# Patient Record
Sex: FEMALE | Race: WHITE | NOT HISPANIC OR LATINO | Employment: FULL TIME | URBAN - NONMETROPOLITAN AREA
[De-identification: names, ages, dates, MRNs, and addresses within clinical notes are randomized per-mention and may not be internally consistent; named-entity substitution may affect disease eponyms.]

---

## 2017-01-04 ENCOUNTER — ROUTINE PRENATAL (OUTPATIENT)
Dept: OBSTETRICS AND GYNECOLOGY | Facility: CLINIC | Age: 22
End: 2017-01-04

## 2017-01-04 DIAGNOSIS — O34.219 PREVIOUS CESAREAN DELIVERY AFFECTING PREGNANCY: ICD-10-CM

## 2017-01-04 DIAGNOSIS — Z3A.32 32 WEEKS GESTATION OF PREGNANCY: ICD-10-CM

## 2017-01-04 DIAGNOSIS — O47.9 BRAXTON HICK'S CONTRACTION: Primary | ICD-10-CM

## 2017-01-04 DIAGNOSIS — O34.219 HX SUCCESSFUL VBAC (VAGINAL BIRTH AFTER CESAREAN), CURRENTLY PREGNANT: ICD-10-CM

## 2017-01-04 PROCEDURE — 99212 OFFICE O/P EST SF 10 MIN: CPT | Performed by: NURSE PRACTITIONER

## 2017-01-04 NOTE — MR AVS SNAPSHOT
Dorina Stockton   2017 9:00 AM   Routine Prenatal    Dept Phone:  [phone]   Encounter #:  [id number]    Provider:  YUNIOR Britton   Department:  [facility]                Your Full Care Plan              Your Updated Medication List          This list is accurate as of: 17  9:25 AM.  Always use your most recent med list.                ondansetron 4 MG tablet   Commonly known as:  ZOFRAN       raNITIdine 150 MG tablet   Commonly known as:  ZANTAC   Take 1 tablet by mouth 2 (two) times a day.               Instructions     None    Patient Instructions History      Upcoming Appointments     Visit Type Date Time Department    OB FOLLOWUP 2017  9:00 AM [facility]    OB FOLLOWUP 2017  1:15 PM [facility]      MyChart Signup     [employer] allows you to send messages to your doctor, view your test results, renew your prescriptions, schedule appointments, and more. To sign up, go to [employer] and click on the Sign Up Now link in the New User? box. Enter your [employer] Activation Code exactly as it appears below along with the last four digits of your Social Security Number and your Date of Birth () to complete the sign-up process. If you do not sign up before the expiration date, you must request a new code.    [employer] Activation Code: [email or web address]  Expires: 2017  9:24 AM    If you have questions, you can email [email or web address]) or call [phone] to talk to our [employer] staff. Remember, [employer] is NOT to be used for urgent needs. For medical emergencies, dial 911.               Other Info from Your Visit           Your Appointments     2017  1:15 PM CST   OB FOLLOWUP with Dominik Toribio MD   [facility] (--)    [address] [place] [address] Dr  [facility] [address] [place] [address] [id number] [phone]              Allergies     Sulfa Antibiotics        Vital Signs      Last Menstrual Period Smoking Status                05/20/2016 (Exact Date) Current Every Day Smoker

## 2017-01-18 ENCOUNTER — HOSPITAL ENCOUNTER (INPATIENT)
Dept: OTHER | Facility: HOSPITAL | Age: 22
LOS: 2 days | Discharge: HOME | End: 2017-01-20
Attending: OBSTETRICS & GYNECOLOGY | Admitting: OBSTETRICS & GYNECOLOGY

## 2017-01-18 LAB
ABO GROUP BLD: NORMAL
ALBUMIN SERPL-MCNC: 2.7 GM/DL (ref 3.4–4.8)
ALP SERPL-CCNC: 139 U/L (ref 38–126)
ALT SERPL-CCNC: 20 U/L (ref 9–52)
AMPHETAMINES UR QL: ABNORMAL
ANION GAP SERPL CALCULATED.3IONS-SCNC: 7 MMOL/L (ref 5–15)
AST SERPL-CCNC: 8 U/L (ref 14–36)
BARBITURATES UR QL: NEGATIVE
BASOPHILS # BLD AUTO: 0.01 X1000/UL (ref 0–0.2)
BASOPHILS NFR BLD AUTO: 0.1 % (ref 0–2)
BENZODIAZ UR QL: NEGATIVE
BILIRUB SERPL-MCNC: 0.3 MG/DL (ref 0.2–1.3)
BLD GP AB INVEST PLASRBC-IMP: NORMAL
BUN SERPL-MCNC: 11 MG/DL (ref 7–21)
CALCIUM SERPL-MCNC: 8.3 MG/DL (ref 8.4–10.2)
CANNABINOIDS UR QL SCN: NEGATIVE
CHLORIDE SERPL-SCNC: 105 MMOL/L (ref 95–110)
CO2 SERPL-SCNC: 23 MMOL/L (ref 22–31)
COCAINE UR QL: NEGATIVE
CONV AUTO IG#: 0.04 X1000/UL (ref 0.01–0.02)
CONV AUTO IG%: 0.4 % (ref 0–0.5)
CREAT SERPL-MCNC: 0.5 MG/DL (ref 0.5–1)
EOSINOPHIL # BLD AUTO: 0.08 X1000/UL (ref 0–0.7)
EOSINOPHIL NFR BLD AUTO: 0.8 % (ref 0–7)
ERYTHROCYTE [DISTWIDTH] IN BLOOD: 14 % (ref 11.5–14.5)
GLUCOSE SERPL-MCNC: 86 MG/DL (ref 60–100)
GRANULOCYTES # BLD AUTO: 7.66 X1000/UL (ref 2–8.6)
GRANULOCYTES NFR BLD AUTO: 72.7 % (ref 37–80)
HCT VFR BLD CALC: 34.7 % (ref 35–45)
HGB BLD-MCNC: 11.5 GM/DL (ref 12–15.5)
LYMPHOCYTES # BLD AUTO: 2.01 X1000/UL (ref 0.6–4.2)
LYMPHOCYTES NFR BLD AUTO: 19.1 % (ref 10–50)
MCH RBC QN: 26.6 PG (ref 26–34)
MCHC RBC-ENTMCNC: 33.1 GM/DL (ref 31.4–36)
MCV RBC: 80.1 FL (ref 80–98)
METHADONE UR QL: NEGATIVE
MONOCYTES # BLD AUTO: 0.73 X1000/UL (ref 0–0.9)
MONOCYTES NFR BLD AUTO: 6.9 % (ref 0–12)
NRBC BLD AUTO-RTO: 0 % (ref 0–0.2)
NRBC SPEC MANUAL: 0 X1000/UL
OPIATES UR QL: NEGATIVE
OXYCODONE UR QL: NEGATIVE
PLATELET # BLD: 187 X1000/MM3 (ref 150–450)
PMV BLD: 9.9 FL (ref 8–12)
POTASSIUM SERPL-SCNC: 3.7 MMOL/L (ref 3.5–5.1)
PREVIOUS ABORH EXIST?: NORMAL
PROT SERPL-MCNC: 5.3 GM/DL (ref 6.3–8.6)
RBC # BLD: 4.33 MEGA/MM3 (ref 3.77–5.16)
RH BLD: NORMAL
SODIUM SERPL-SCNC: 135 MMOL/L (ref 137–145)
WBC # BLD: 10.5 X1000/UL (ref 3.2–9.8)

## 2017-01-19 LAB
HCT VFR BLD CALC: 28.1 % (ref 35–45)
HGB BLD-MCNC: 9.2 GM/DL (ref 12–15.5)

## 2017-01-20 VITALS — BODY MASS INDEX: 31.08 KG/M2 | HEIGHT: 67 IN | WEIGHT: 198 LBS

## 2017-01-20 LAB
CONVERTED (HISTORICAL) SPECIMEN DESCRIPTION 1: NORMAL
RPR SER QL: NON REACTIVE

## 2017-01-20 RX ORDER — IBUPROFEN 800 MG/1
800 TABLET ORAL EVERY 8 HOURS PRN
COMMUNITY
End: 2017-06-08

## 2017-01-20 RX ORDER — PRENATAL VIT NO.126/IRON/FOLIC 28MG-0.8MG
1 TABLET ORAL DAILY
COMMUNITY
End: 2017-04-10

## 2017-01-23 LAB
AMPHET UR CFM-MCNC: 4580 NG/ML
AMPHET UR QL CFM: POSITIVE
AMPHETAMINES UR QL: POSITIVE
Lab: ABNORMAL
METHAMPHET UR CFM-MCNC: >4000 NG/ML
METHAMPHET UR QL CFM: POSITIVE

## 2017-04-10 ENCOUNTER — OFFICE VISIT (OUTPATIENT)
Dept: OBSTETRICS AND GYNECOLOGY | Facility: CLINIC | Age: 22
End: 2017-04-10

## 2017-04-10 DIAGNOSIS — Z30.430 ENCOUNTER FOR IUD INSERTION: ICD-10-CM

## 2017-04-10 PROCEDURE — 99024 POSTOP FOLLOW-UP VISIT: CPT | Performed by: OBSTETRICS & GYNECOLOGY

## 2017-04-10 PROCEDURE — 58300 INSERT INTRAUTERINE DEVICE: CPT | Performed by: OBSTETRICS & GYNECOLOGY

## 2017-04-10 NOTE — PROGRESS NOTES
Subjective   Chief Complaint   Patient presents with   • Postpartum Care     Dorina Stockton is a 22 y.o. year old  presenting for a postpartum visit.  She had a vaginal delivery.   Prenatal course was been benign.    Since delivery she has been sexually active.  She does not have concerns about post-partum blues/depression.   She is bottle feeding    The following portions of the patient's history were reviewed and updated as appropriate:current medications, allergies and past surgical history    Smoking status: Current Every Day Smoker                                                   Packs/day: 0.25      Years: 0.00         Types: Cigarettes     Smokeless status: Not on file                       Review of Systems      Objective   LMP 2016 (Exact Date)  Breastfeeding? No    General:  well developed; well nourished  no acute distress   Abdomen: soft, non-tender; no masses  no umbilical or inginual hernias are present  no hepato-splenomegaly   Pelvis: Clinical staff was present for exam  External genitalia:  normal appearance of the external genitalia including Bartholin's and [place]'s glands.  :  urethral meatus normal; urethral hypermobility is absent.  Vaginal:  normal pink mucosa without prolapse or lesions.  Cervix:  normal appearance.  Uterus:  normal size, shape and consistency.       Risks and benefits discussed? yes  All questions answered? yes  Consents given by The patient  Written consent obtained? yes    Local anesthesia used:  no  NDC number:    Procedure documentation:    After verifying the patient had a low probability of being pregnant and met the criteria for insertion, a speculum was placed and the cervix was cleansed with an antiseptic solution.  The anterior lip of the cervix was grasped and the uterine cavity was gently sounded. There was mild difficulty passing the sound through the cervix.  Cervical dilation did not need to be performed prior to placing the IUD.  The  uterus was anteverted and sounded to 7 cms.  The Shirley was then prepared per the manufacturers instructions.    The Shirley was advanced to a point 2 cms from the fundus and then the arms were released from the sheath.  The device was advanced to the fundus and the device was released fully from the sheath.. The string was cut 3 cms in length.  Bleeding from the cervix was scant.    She tolerated the procedure without any difficulty.    NDC 60309-161-38     Post procedure instructions: Call if any fever or excessive bleeding or pain.                                                       Nothing in the vagina for the next week.    Follow up needed:  Three weeks for a string check, sooner if she has any problems.    This note was electronically signed.     Assessment   1. Normal 12 week postpartum exam  2. IUD insertion     Plan   1. Follow up in 3 weeks for a string check         This note was electronically signed.    Dominik Toribio MD  April 10, 2017

## 2017-05-01 ENCOUNTER — OFFICE VISIT (OUTPATIENT)
Dept: OBSTETRICS AND GYNECOLOGY | Facility: CLINIC | Age: 22
End: 2017-05-01

## 2017-05-01 VITALS
HEIGHT: 67 IN | DIASTOLIC BLOOD PRESSURE: 80 MMHG | SYSTOLIC BLOOD PRESSURE: 128 MMHG | BODY MASS INDEX: 27.31 KG/M2 | WEIGHT: 174 LBS

## 2017-05-01 DIAGNOSIS — Z30.431 IUD CHECK UP: Primary | ICD-10-CM

## 2017-05-01 PROCEDURE — 99212 OFFICE O/P EST SF 10 MIN: CPT | Performed by: OBSTETRICS & GYNECOLOGY

## 2017-06-07 ENCOUNTER — APPOINTMENT (OUTPATIENT)
Dept: GENERAL RADIOLOGY | Facility: HOSPITAL | Age: 22
End: 2017-06-07

## 2017-06-07 ENCOUNTER — HOSPITAL ENCOUNTER (EMERGENCY)
Facility: HOSPITAL | Age: 22
Discharge: HOME | End: 2017-06-07
Attending: EMERGENCY MEDICINE | Admitting: EMERGENCY MEDICINE

## 2017-06-07 VITALS
OXYGEN SATURATION: 97 % | HEIGHT: 67 IN | DIASTOLIC BLOOD PRESSURE: 68 MMHG | HEART RATE: 68 BPM | WEIGHT: 175 LBS | RESPIRATION RATE: 16 BRPM | BODY MASS INDEX: 27.47 KG/M2 | TEMPERATURE: 98.1 F | SYSTOLIC BLOOD PRESSURE: 133 MMHG

## 2017-06-07 DIAGNOSIS — R10.30 LOWER ABDOMINAL PAIN: Primary | ICD-10-CM

## 2017-06-07 LAB
ALBUMIN SERPL-MCNC: 4.3 G/DL (ref 3.4–4.8)
ALBUMIN/GLOB SERPL: 1.3 G/DL (ref 1.1–1.8)
ALP SERPL-CCNC: 91 U/L (ref 38–126)
ALT SERPL W P-5'-P-CCNC: 34 U/L (ref 9–52)
ANION GAP SERPL CALCULATED.3IONS-SCNC: 12 MMOL/L (ref 5–15)
AST SERPL-CCNC: 16 U/L (ref 14–36)
BASOPHILS # BLD AUTO: 0.04 10*3/MM3 (ref 0–0.2)
BASOPHILS NFR BLD AUTO: 0.5 % (ref 0–2)
BILIRUB SERPL-MCNC: 0.3 MG/DL (ref 0.2–1.3)
BILIRUB UR QL STRIP: NEGATIVE
BUN BLD-MCNC: 8 MG/DL (ref 7–21)
BUN/CREAT SERPL: 11.3 (ref 7–25)
CALCIUM SPEC-SCNC: 9.3 MG/DL (ref 8.4–10.2)
CHLORIDE SERPL-SCNC: 103 MMOL/L (ref 95–110)
CLARITY UR: CLEAR
CO2 SERPL-SCNC: 24 MMOL/L (ref 22–31)
COLOR UR: YELLOW
CREAT BLD-MCNC: 0.71 MG/DL (ref 0.5–1)
DEPRECATED RDW RBC AUTO: 45.8 FL (ref 36.4–46.3)
EOSINOPHIL # BLD AUTO: 0.36 10*3/MM3 (ref 0–0.7)
EOSINOPHIL NFR BLD AUTO: 4.1 % (ref 0–7)
ERYTHROCYTE [DISTWIDTH] IN BLOOD BY AUTOMATED COUNT: 16.3 % (ref 11.5–14.5)
GFR SERPL CREATININE-BSD FRML MDRD: 102 ML/MIN/1.73 (ref 60–165)
GLOBULIN UR ELPH-MCNC: 3.4 GM/DL (ref 2.3–3.5)
GLUCOSE BLD-MCNC: 81 MG/DL (ref 60–100)
GLUCOSE UR STRIP-MCNC: NEGATIVE MG/DL
HCT VFR BLD AUTO: 40 % (ref 35–45)
HGB BLD-MCNC: 13.1 G/DL (ref 12–15.5)
HGB UR QL STRIP.AUTO: NEGATIVE
HOLD SPECIMEN: NORMAL
HOLD SPECIMEN: NORMAL
IMM GRANULOCYTES # BLD: 0.01 10*3/MM3 (ref 0–0.02)
IMM GRANULOCYTES NFR BLD: 0.1 % (ref 0–0.5)
KETONES UR QL STRIP: NEGATIVE
LEUKOCYTE ESTERASE UR QL STRIP.AUTO: NEGATIVE
LIPASE SERPL-CCNC: 76 U/L (ref 23–300)
LYMPHOCYTES # BLD AUTO: 2.6 10*3/MM3 (ref 0.6–4.2)
LYMPHOCYTES NFR BLD AUTO: 29.4 % (ref 10–50)
MCH RBC QN AUTO: 25 PG (ref 26.5–34)
MCHC RBC AUTO-ENTMCNC: 32.8 G/DL (ref 31.4–36)
MCV RBC AUTO: 76.2 FL (ref 80–98)
MONOCYTES # BLD AUTO: 0.57 10*3/MM3 (ref 0–0.9)
MONOCYTES NFR BLD AUTO: 6.4 % (ref 0–12)
NEUTROPHILS # BLD AUTO: 5.26 10*3/MM3 (ref 2–8.6)
NEUTROPHILS NFR BLD AUTO: 59.5 % (ref 37–80)
NITRITE UR QL STRIP: NEGATIVE
PH UR STRIP.AUTO: 7.5 [PH] (ref 5–9)
PLATELET # BLD AUTO: 225 10*3/MM3 (ref 150–450)
PMV BLD AUTO: 10.5 FL (ref 8–12)
POTASSIUM BLD-SCNC: 3.6 MMOL/L (ref 3.5–5.1)
PROT SERPL-MCNC: 7.7 G/DL (ref 6.3–8.6)
PROT UR QL STRIP: NEGATIVE
RBC # BLD AUTO: 5.25 10*6/MM3 (ref 3.77–5.16)
SODIUM BLD-SCNC: 139 MMOL/L (ref 137–145)
SP GR UR STRIP: 1.01 (ref 1–1.03)
UROBILINOGEN UR QL STRIP: NORMAL
WBC NRBC COR # BLD: 8.84 10*3/MM3 (ref 3.2–9.8)
WHOLE BLOOD HOLD SPECIMEN: NORMAL
WHOLE BLOOD HOLD SPECIMEN: NORMAL

## 2017-06-07 PROCEDURE — 81003 URINALYSIS AUTO W/O SCOPE: CPT | Performed by: EMERGENCY MEDICINE

## 2017-06-07 PROCEDURE — 83690 ASSAY OF LIPASE: CPT | Performed by: EMERGENCY MEDICINE

## 2017-06-07 PROCEDURE — 25010000002 ONDANSETRON PER 1 MG: Performed by: EMERGENCY MEDICINE

## 2017-06-07 PROCEDURE — 85025 COMPLETE CBC W/AUTO DIFF WBC: CPT | Performed by: EMERGENCY MEDICINE

## 2017-06-07 PROCEDURE — 80053 COMPREHEN METABOLIC PANEL: CPT | Performed by: EMERGENCY MEDICINE

## 2017-06-07 PROCEDURE — 74020 HC XR ABDOMEN FLAT & UPRIGHT: CPT

## 2017-06-07 PROCEDURE — 96375 TX/PRO/DX INJ NEW DRUG ADDON: CPT

## 2017-06-07 PROCEDURE — 99284 EMERGENCY DEPT VISIT MOD MDM: CPT

## 2017-06-07 PROCEDURE — 96374 THER/PROPH/DIAG INJ IV PUSH: CPT

## 2017-06-07 PROCEDURE — 96361 HYDRATE IV INFUSION ADD-ON: CPT

## 2017-06-07 PROCEDURE — 25010000002 HYDROMORPHONE PER 4 MG: Performed by: EMERGENCY MEDICINE

## 2017-06-07 PROCEDURE — 25010000002 KETOROLAC TROMETHAMINE PER 15 MG: Performed by: EMERGENCY MEDICINE

## 2017-06-07 RX ORDER — ONDANSETRON 2 MG/ML
4 INJECTION INTRAMUSCULAR; INTRAVENOUS ONCE
Status: COMPLETED | OUTPATIENT
Start: 2017-06-07 | End: 2017-06-07

## 2017-06-07 RX ORDER — KETOROLAC TROMETHAMINE 30 MG/ML
30 INJECTION, SOLUTION INTRAMUSCULAR; INTRAVENOUS ONCE
Status: COMPLETED | OUTPATIENT
Start: 2017-06-07 | End: 2017-06-07

## 2017-06-07 RX ORDER — SODIUM CHLORIDE 0.9 % (FLUSH) 0.9 %
10 SYRINGE (ML) INJECTION AS NEEDED
Status: DISCONTINUED | OUTPATIENT
Start: 2017-06-07 | End: 2017-06-07 | Stop reason: HOSPADM

## 2017-06-07 RX ORDER — HYDROCODONE BITARTRATE AND ACETAMINOPHEN 5; 325 MG/1; MG/1
1 TABLET ORAL EVERY 6 HOURS PRN
Qty: 10 TABLET | Refills: 0 | Status: SHIPPED | OUTPATIENT
Start: 2017-06-07 | End: 2017-10-23

## 2017-06-07 RX ADMIN — SODIUM CHLORIDE 1000 ML: 9 INJECTION, SOLUTION INTRAVENOUS at 01:23

## 2017-06-07 RX ADMIN — KETOROLAC TROMETHAMINE 30 MG: 30 INJECTION, SOLUTION INTRAMUSCULAR at 01:22

## 2017-06-07 RX ADMIN — HYDROMORPHONE HYDROCHLORIDE 1 MG: 1 INJECTION, SOLUTION INTRAMUSCULAR; INTRAVENOUS; SUBCUTANEOUS at 01:23

## 2017-06-07 RX ADMIN — ONDANSETRON 4 MG: 2 INJECTION INTRAMUSCULAR; INTRAVENOUS at 01:22

## 2017-06-07 NOTE — DISCHARGE INSTRUCTIONS
Followup with Dr. Toribio as scheduled to discuss options for IUD removal.  Return with any new or worsening symptoms, or any concerns.

## 2017-06-07 NOTE — ED PROVIDER NOTES
Subjective   HPI Comments: Patient presents with abdominal pain for well over one month.  Patient states she has been seen by her GYN in the last month for the same.  Patient has had progressive lower abdominal cramping.  No vaginal discharge.  Is set to see her physician, Dr. Toribio again on Thursday, so she can hopefully have her IUD removed.  No f/c.  No n/v.  No diarrhea, no dysuria.  No new sexual partners.        History provided by:  Patient   used: No        Review of Systems   Constitutional: Negative.  Negative for appetite change, chills and fever.   HENT: Negative.  Negative for congestion.    Eyes: Negative.  Negative for photophobia and visual disturbance.   Respiratory: Negative.  Negative for cough, chest tightness and shortness of breath.    Cardiovascular: Negative.  Negative for chest pain and palpitations.   Gastrointestinal: Positive for abdominal pain. Negative for constipation, diarrhea, nausea and vomiting.   Endocrine: Negative.    Genitourinary: Negative.  Negative for decreased urine volume, dysuria, flank pain and hematuria.   Musculoskeletal: Negative.  Negative for arthralgias, back pain, myalgias, neck pain and neck stiffness.   Skin: Negative.  Negative for pallor.   Neurological: Negative.  Negative for dizziness, syncope, weakness, light-headedness, numbness and headaches.   Psychiatric/Behavioral: Negative.  Negative for confusion and suicidal ideas. The patient is not nervous/anxious.        Past Medical History:   Diagnosis Date   • Abdominal pain    • Acute bronchitis    • Dietary counseling and surveillance    • Encounter for  visit    • General counseling and advice on female contraception     would like a gary   • Gestational diabetes mellitus    • Nausea    • Pregnancy test positive     serum   • Tension type headache    • Viral gastroenteritis        Allergies   Allergen Reactions   • Sulfa Antibiotics        History reviewed. No pertinent  surgical history.    Family History   Problem Relation Age of Onset   • Other Father      carbon monoxide poisoning   • Cancer Other    • Diabetes Other    • Hypertension Other    • Stroke Other        Social History     Social History   • Marital status: Single     Spouse name: N/A   • Number of children: N/A   • Years of education: N/A     Social History Main Topics   • Smoking status: Current Every Day Smoker     Packs/day: 0.25     Types: Cigarettes   • Smokeless tobacco: None   • Alcohol use None   • Drug use: None   • Sexual activity: Not Asked     Other Topics Concern   • None     Social History Narrative           Objective   Physical Exam   Constitutional: She is oriented to person, place, and time. She appears well-developed and well-nourished. No distress.   HENT:   Head: Normocephalic and atraumatic.   Nose: Nose normal.   Mouth/Throat: Oropharynx is clear and moist.   Eyes: Conjunctivae and EOM are normal. No scleral icterus.   Neck: Normal range of motion. Neck supple. No JVD present.   Cardiovascular: Normal rate, regular rhythm, normal heart sounds and intact distal pulses.  Exam reveals no gallop and no friction rub.    No murmur heard.  Pulmonary/Chest: Effort normal. No respiratory distress. She has no wheezes. She has no rales. She exhibits no tenderness.   Abdominal: Soft. She exhibits no distension and no mass. There is tenderness (Mild to moderate RLQ suprapubic tenderness without peritoneal signs.  ). There is no rebound and no guarding.   Musculoskeletal: Normal range of motion. She exhibits no edema, tenderness or deformity.   Lymphadenopathy:     She has no cervical adenopathy.   Neurological: She is alert and oriented to person, place, and time. No cranial nerve deficit. She exhibits normal muscle tone.   Skin: Skin is warm and dry. No rash noted. She is not diaphoretic. No erythema. No pallor.   Psychiatric: She has a normal mood and affect. Her behavior is normal. Judgment and thought  content normal.   Nursing note and vitals reviewed.      Procedures         ED Course  ED Course      Labs Reviewed   CBC WITH AUTO DIFFERENTIAL - Abnormal; Notable for the following:        Result Value    RBC 5.25 (*)     MCV 76.2 (*)     MCH 25.0 (*)     RDW 16.3 (*)     All other components within normal limits   COMPREHENSIVE METABOLIC PANEL - Normal   LIPASE - Normal   URINALYSIS W/ CULTURE IF INDICATED - Normal    Narrative:     Urine microscopic not indicated.   RAINBOW DRAW    Narrative:     The following orders were created for panel order [place] Draw.  Procedure                               Abnormality         Status                     ---------                               -----------         ------                     Light Blue Top[[id number]]                                    In process                 Green Top (Gel)[[id number]]                                   In process                 Lavender Top[[id number]]                                      In process                 Gold Top - SST[[id number]]                                    In process                   Please view results for these tests on the individual orders.   CBC AND DIFFERENTIAL    Narrative:     The following orders were created for panel order CBC & Differential.  Procedure                               Abnormality         Status                     ---------                               -----------         ------                     CBC Auto Differential[[id number]]         Abnormal            Final result                 Please view results for these tests on the individual orders.   LIGHT BLUE TOP   GREEN TOP   LAVENDER TOP   GOLD TOP - SST       XR Abdomen Flat & Upright   Final Result   Nonspecific abdomen.      Electronically signed by:  Teo Soto  6/7/2017 1:24 AM CDT   Workstation: [id number]        No peritoneal signs.  Subacute symptoms, though worsening.  Patient has OB followup in less than 48 hours which  appears appropriate for the gradual worsening of symptoms.  Patient associates discomfort with IUD and at this point seems convinced nothing but its removal will improve the symptoms and her associated headaches.  Continues without peritoneal signs.  Improved with ED interventions.  Plan short term pain control.  [facility]#[id number]              [facility]    Final diagnoses:   Lower abdominal pain            Bernardo Meier MD  06/07/17 0152       Bernardo Meier MD  06/07/17 0157

## 2017-06-08 ENCOUNTER — OFFICE VISIT (OUTPATIENT)
Dept: OBSTETRICS AND GYNECOLOGY | Facility: CLINIC | Age: 22
End: 2017-06-08

## 2017-06-08 VITALS
BODY MASS INDEX: 27.94 KG/M2 | DIASTOLIC BLOOD PRESSURE: 88 MMHG | WEIGHT: 178 LBS | HEIGHT: 67 IN | SYSTOLIC BLOOD PRESSURE: 138 MMHG

## 2017-06-08 DIAGNOSIS — T83.9XXA COMPLICATION OF INTRAUTERINE DEVICE (IUD), UNSPECIFIED COMPLICATION, INITIAL ENCOUNTER (HCC): Primary | ICD-10-CM

## 2017-06-08 PROCEDURE — 58301 REMOVE INTRAUTERINE DEVICE: CPT | Performed by: OBSTETRICS & GYNECOLOGY

## 2017-06-08 NOTE — PROGRESS NOTES
IUD Removal    Date of procedure:  6/8/2017    Risks and benefits discussed? yes  All questions answered? yes  Consents given by The patient  Written consent obtained? yes  Reason for removal: Side effect: The patient has been having headaches since it was placed    Local anesthesia used:  no    Procedure documentation:    A speculum was placed in order to view the cervix.  The cervix did not need to be grasped.  Cervical dilation did not need to be performed in order to access the string.  The IUD string was easily seen.  The string was grasped and the IUD was removed without difficulty.  The IUD did not appear to be adherent to the uterine cavity. It was removed intact.    She tolerated the procedure without any difficulty.     Post procedure instructions: Patient notified to call with heavy bleeding, fever or increasing pain.    Follow up needed: We'll see how the patient's headaches do over the next 1-2 months.  If her headaches go away she would like to get on the birth control pill.  If her headaches persist she may need a primary care appointment or a neurology appointment.    This note was electronically signed.    Dominik Toribio MD

## 2017-07-11 ENCOUNTER — OFFICE VISIT (OUTPATIENT)
Dept: OPHTHALMOLOGY | Facility: CLINIC | Age: 22
End: 2017-07-11

## 2017-07-11 DIAGNOSIS — H52.202 ASTIGMATISM, LEFT: ICD-10-CM

## 2017-07-11 DIAGNOSIS — H52.13 MYOPIA, BILATERAL: Primary | ICD-10-CM

## 2017-07-11 PROCEDURE — 92002 INTRM OPH EXAM NEW PATIENT: CPT | Performed by: OPHTHALMOLOGY

## 2017-07-11 NOTE — PROGRESS NOTES
Subjective   Dorina Stockton is a 22 y.o. female.   Chief Complaint   Patient presents with   • Eye Exam     HPI     Eye Exam   Laterality: both eyes   Quality: blurry vision   Severity: mild           Comments   For years, glasses in past       Last edited by Ruben Cruz MD on 7/11/2017  9:10 AM. (History)          Review of Systems   Eyes: Positive for visual disturbance.       Objective   Base Eye Exam     Visual Acuity (Snellen - Linear)      Right Left   Dist sc 20/30 20/40   Near sc J3 J3         Tonometry (Applanation, 9:10 AM)      Right Left   Pressure 18 18         Pupils      Pupils   Right PERRL   Left PERRL         Visual Fields      Left Right   Result Full Full         Extraocular Movement      Right Left   Result Full Full               Slit Lamp and Fundus Exam     External Exam      Right Left    External Normal Normal      Slit Lamp Exam      Right Left    Lids/Lashes Normal Normal    Conjunctiva/Sclera White and quiet White and quiet    Cornea Clear Clear    Anterior Chamber Deep and quiet Deep and quiet    Iris Round and reactive Round and reactive    Lens Clear Clear    Vitreous Normal Normal      Fundus Exam      Right Left    Disc Normal Normal    Macula Normal Normal    Vessels Normal Normal            Refraction     Manifest Refraction      Sphere Cylinder Axis Dist   Right -0.25 Sphere  20/20   Left -0.25 +0.50 105 20/20         Final Rx      Sphere Cylinder Axis   Right -0.25 Sphere    Left -0.25 +0.50 105                   Assessment/Plan   Diagnoses and all orders for this visit:    Myopia, bilateral    Astigmatism, left      Plan:    Glasses Rx given per refraction  For very little improvement

## 2017-09-20 ENCOUNTER — APPOINTMENT (OUTPATIENT)
Dept: LAB | Facility: HOSPITAL | Age: 22
End: 2017-09-20

## 2017-09-20 ENCOUNTER — OFFICE VISIT (OUTPATIENT)
Dept: OBSTETRICS AND GYNECOLOGY | Facility: CLINIC | Age: 22
End: 2017-09-20

## 2017-09-20 VITALS
WEIGHT: 163.8 LBS | SYSTOLIC BLOOD PRESSURE: 118 MMHG | HEIGHT: 67 IN | BODY MASS INDEX: 25.71 KG/M2 | DIASTOLIC BLOOD PRESSURE: 76 MMHG

## 2017-09-20 DIAGNOSIS — N92.1 PROLONGED MENSTRUAL CYCLE: Primary | ICD-10-CM

## 2017-09-20 DIAGNOSIS — Z30.09 ENCOUNTER FOR EDUCATION ABOUT CONTRACEPTIVE USE: ICD-10-CM

## 2017-09-20 LAB
BASOPHILS # BLD AUTO: 0.02 10*3/MM3 (ref 0–0.2)
BASOPHILS NFR BLD AUTO: 0.3 % (ref 0–2)
DEPRECATED RDW RBC AUTO: 43.3 FL (ref 36.4–46.3)
EOSINOPHIL # BLD AUTO: 0.33 10*3/MM3 (ref 0–0.7)
EOSINOPHIL NFR BLD AUTO: 4.9 % (ref 0–7)
ERYTHROCYTE [DISTWIDTH] IN BLOOD BY AUTOMATED COUNT: 14.8 % (ref 11.5–14.5)
HCT VFR BLD AUTO: 40.8 % (ref 35–45)
HGB BLD-MCNC: 12.8 G/DL (ref 12–15.5)
IMM GRANULOCYTES # BLD: 0.01 10*3/MM3 (ref 0–0.02)
IMM GRANULOCYTES NFR BLD: 0.1 % (ref 0–0.5)
LYMPHOCYTES # BLD AUTO: 2.68 10*3/MM3 (ref 0.6–4.2)
LYMPHOCYTES NFR BLD AUTO: 39.6 % (ref 10–50)
MCH RBC QN AUTO: 25 PG (ref 26.5–34)
MCHC RBC AUTO-ENTMCNC: 31.4 G/DL (ref 31.4–36)
MCV RBC AUTO: 79.8 FL (ref 80–98)
MONOCYTES # BLD AUTO: 0.47 10*3/MM3 (ref 0–0.9)
MONOCYTES NFR BLD AUTO: 6.9 % (ref 0–12)
NEUTROPHILS # BLD AUTO: 3.26 10*3/MM3 (ref 2–8.6)
NEUTROPHILS NFR BLD AUTO: 48.2 % (ref 37–80)
PLATELET # BLD AUTO: 218 10*3/MM3 (ref 150–450)
PMV BLD AUTO: 11.4 FL (ref 8–12)
RBC # BLD AUTO: 5.11 10*6/MM3 (ref 3.77–5.16)
TSH SERPL DL<=0.05 MIU/L-ACNC: 1.45 MIU/ML (ref 0.46–4.68)
WBC NRBC COR # BLD: 6.77 10*3/MM3 (ref 3.2–9.8)

## 2017-09-20 PROCEDURE — 36415 COLL VENOUS BLD VENIPUNCTURE: CPT | Performed by: NURSE PRACTITIONER

## 2017-09-20 PROCEDURE — 99213 OFFICE O/P EST LOW 20 MIN: CPT | Performed by: NURSE PRACTITIONER

## 2017-09-20 PROCEDURE — 85025 COMPLETE CBC W/AUTO DIFF WBC: CPT | Performed by: NURSE PRACTITIONER

## 2017-09-20 PROCEDURE — 84443 ASSAY THYROID STIM HORMONE: CPT | Performed by: NURSE PRACTITIONER

## 2017-09-20 RX ORDER — BROMPHENIRAMINE MALEATE, PSEUDOEPHEDRINE HYDROCHLORIDE, AND DEXTROMETHORPHAN HYDROBROMIDE 2; 30; 10 MG/5ML; MG/5ML; MG/5ML
SYRUP ORAL
Refills: 0 | COMMUNITY
Start: 2017-06-15 | End: 2017-10-23

## 2017-09-20 NOTE — PROGRESS NOTES
"Subjective   History of Present Illness    Dorinaaneesh Stockton is a 22 y.o. female who presents with c/o prolonged menstrual cycle since July. She had gary IUD removed in , notes that her menstrual cycles were q 30 days when using IUD. Her cycles were heavy, saturating through 1 pad/hr for one month. It is now lessened to a light spotting.     Para 4  Current contraception: None  Sexually active?: Yes  Postmenopausal?: No  HRT?: no  Hysterectomy?: no    Date last pap: 2016  History of abnormal Pap smear: no    /76  Ht 67\" (170.2 cm)  Wt 163 lb 12.8 oz (74.3 kg)  BMI 25.65 kg/m2    Past Medical History:   Diagnosis Date   • Abdominal pain    • Acute bronchitis    • Corneal abrasion    • Dietary counseling and surveillance    • Encounter for  visit    • General counseling and advice on female contraception     would like a gary   • Gestational diabetes mellitus    • Nausea    • Pregnancy test positive     serum   • Tension type headache    • Viral gastroenteritis        History reviewed. No pertinent surgical history.    The following portions of the patient's history were reviewed and updated as appropriate: allergies, current medications, past family history, past medical history, past social history, past surgical history and problem list.    Review of Systems    Constitutional:  No fatigue, No weight loss, No weight gain, No fever and No chills   Respiratory: No dyspnea, No cough, No hemopytysis, No wheezing and No pleuritic pain   Cardiovascular: No chest pain, No palpitations, No arrhythmia, No orthopnea, No noctural dyspnea, No edema and No claudication   Breasts: No discharge from nipple, No breast tenderness and No breast mass   Gastrointestinal: No loss of appetite, No dysphagia, No abdominal pain, No nausea, No vomiting, No change in bowel habits, No diarrhea, No constipation and No blood in stool   Genitourinary: No increased frequency of urination, No dysuria, No hematuria, No " nocturia, No urinary incontinence, No vaginal discharge, No abnormal vaginal bleeding, No pelvic pain, No menopausal problem and Menstrual problem   Skin: No skin rash, No skin lesion, No dry skin, No pruritus and No nail problem   Neurologic: No headache, No dizziness, No lightheadedness, No syncope, No vertigo, No weakness, No numbness, No tremor and No paresthesia   Psychiatric: No difficulty sleeping, No mood swings, No feeling anxious, No confusion and No memory loss          Objective   Physical Exam    General:  Alert and oriented x 3, Cooperative, Well developed & well nourished and No acute distress       Assessment/Plan   Dorina was seen today for menstrual problem.    Diagnoses and all orders for this visit:    Prolonged menstrual cycle  -     CBC & Differential  -     TSH  -     CBC Auto Differential    Encounter for education about contraceptive use      All questions answered.  Labs today.  Discussed contraception methods, including risks/side effects/benefits.     Patient needs time to think about contraceptive options. Will call with lab results.

## 2017-10-25 ENCOUNTER — APPOINTMENT (OUTPATIENT)
Dept: LAB | Facility: HOSPITAL | Age: 22
End: 2017-10-25

## 2017-10-25 ENCOUNTER — OFFICE VISIT (OUTPATIENT)
Dept: OBSTETRICS AND GYNECOLOGY | Facility: CLINIC | Age: 22
End: 2017-10-25

## 2017-10-25 VITALS
SYSTOLIC BLOOD PRESSURE: 102 MMHG | WEIGHT: 169 LBS | HEIGHT: 67 IN | BODY MASS INDEX: 26.53 KG/M2 | DIASTOLIC BLOOD PRESSURE: 60 MMHG

## 2017-10-25 DIAGNOSIS — N92.1 PROLONGED MENSTRUAL CYCLE: Primary | ICD-10-CM

## 2017-10-25 LAB
BASOPHILS # BLD AUTO: 0.05 10*3/MM3 (ref 0–0.2)
BASOPHILS NFR BLD AUTO: 0.6 % (ref 0–2)
DEPRECATED RDW RBC AUTO: 41.8 FL (ref 36.4–46.3)
EOSINOPHIL # BLD AUTO: 0.36 10*3/MM3 (ref 0–0.7)
EOSINOPHIL NFR BLD AUTO: 4.3 % (ref 0–7)
ERYTHROCYTE [DISTWIDTH] IN BLOOD BY AUTOMATED COUNT: 14.5 % (ref 11.5–14.5)
FSH SERPL-ACNC: 6.6 MIU/ML
HCT VFR BLD AUTO: 39.6 % (ref 35–45)
HGB BLD-MCNC: 12.7 G/DL (ref 12–15.5)
IMM GRANULOCYTES # BLD: 0.01 10*3/MM3 (ref 0–0.02)
IMM GRANULOCYTES NFR BLD: 0.1 % (ref 0–0.5)
LH SERPL-ACNC: 3.87 MIU/ML
LYMPHOCYTES # BLD AUTO: 2.54 10*3/MM3 (ref 0.6–4.2)
LYMPHOCYTES NFR BLD AUTO: 30.1 % (ref 10–50)
MCH RBC QN AUTO: 25.5 PG (ref 26.5–34)
MCHC RBC AUTO-ENTMCNC: 32.1 G/DL (ref 31.4–36)
MCV RBC AUTO: 79.4 FL (ref 80–98)
MONOCYTES # BLD AUTO: 0.45 10*3/MM3 (ref 0–0.9)
MONOCYTES NFR BLD AUTO: 5.3 % (ref 0–12)
NEUTROPHILS # BLD AUTO: 5.02 10*3/MM3 (ref 2–8.6)
NEUTROPHILS NFR BLD AUTO: 59.6 % (ref 37–80)
PLATELET # BLD AUTO: 246 10*3/MM3 (ref 150–450)
PMV BLD AUTO: 10.6 FL (ref 8–12)
RBC # BLD AUTO: 4.99 10*6/MM3 (ref 3.77–5.16)
T4 FREE SERPL-MCNC: 1.01 NG/DL (ref 0.78–2.19)
TSH SERPL DL<=0.05 MIU/L-ACNC: 3.7 MIU/ML (ref 0.46–4.68)
WBC NRBC COR # BLD: 8.43 10*3/MM3 (ref 3.2–9.8)

## 2017-10-25 PROCEDURE — 85025 COMPLETE CBC W/AUTO DIFF WBC: CPT | Performed by: OBSTETRICS & GYNECOLOGY

## 2017-10-25 PROCEDURE — 83002 ASSAY OF GONADOTROPIN (LH): CPT | Performed by: OBSTETRICS & GYNECOLOGY

## 2017-10-25 PROCEDURE — 83001 ASSAY OF GONADOTROPIN (FSH): CPT | Performed by: OBSTETRICS & GYNECOLOGY

## 2017-10-25 PROCEDURE — 84439 ASSAY OF FREE THYROXINE: CPT | Performed by: OBSTETRICS & GYNECOLOGY

## 2017-10-25 PROCEDURE — 36415 COLL VENOUS BLD VENIPUNCTURE: CPT | Performed by: OBSTETRICS & GYNECOLOGY

## 2017-10-25 PROCEDURE — 99212 OFFICE O/P EST SF 10 MIN: CPT | Performed by: OBSTETRICS & GYNECOLOGY

## 2017-10-25 PROCEDURE — 84443 ASSAY THYROID STIM HORMONE: CPT | Performed by: OBSTETRICS & GYNECOLOGY

## 2017-10-25 NOTE — PROGRESS NOTES
"  No chief complaint on file.    Dorina Stockton is a 22 y.o. year old .  No LMP recorded.  She presents with a chief complaint of heavy prolonged bleeding.  The patient notes that over the past 2-1/2 months she's only had a 1 month break of bleeding.  The bleeding is heavy.  She can bleed up to 1-2 pads per hour and she can bleed around her protection onto her clothes.  She's noted fatigue headaches and cramping with her bleeding.  She did have a Gary in place but she removed it because of pain.  She's not currently on any type of birth control..         Past Medical History:   Diagnosis Date   • Abdominal pain    • Acute bronchitis    • Corneal abrasion    • Dietary counseling and surveillance    • Encounter for  visit    • General counseling and advice on female contraception     would like a gary   • Gestational diabetes mellitus    • Nausea    • Pregnancy test positive     serum   • Tension type headache    • Viral gastroenteritis      No past surgical history on file.  No current outpatient prescriptions on file.  Allergies   Allergen Reactions   • Sulfa Antibiotics      Smoking status: Current Every Day Smoker                                                   Packs/day: 0.25      Years: 0.00         Types: Cigarettes  Smokeless status: Never Used                        Review of Systems   Constitutional: Negative for activity change, appetite change, chills and fever.   Gastrointestinal: Negative for abdominal distention and abdominal pain.   Genitourinary: Negative for difficulty urinating, dysuria, flank pain, genital sores, pelvic pain, vaginal bleeding, vaginal discharge and vaginal pain.        /60  Ht 67\" (170.2 cm)  Wt 169 lb (76.7 kg)  BMI 26.47 kg/m2    General:  well developed; well nourished  no acute distress   Thyroid: not examined   Lungs:  breathing is unlabored   Heart:  Not performed.   Breasts:  Not performed.   Abdomen: Not performed.   Pelvis: Not performed. "     Lab Review   No data reviewed      Imaging   No data reviewed    Assessment      Diagnosis Plan   1. Prolonged menstrual cycle  CBC Auto Differential    Follicle Stimulating Hormone    Luteinizing Hormone    T4, Free    TSH    US Non-ob Transvaginal          Plan   1. We'll see the patient back after her ultrasound and review her ultrasound and laboratory findings and determine therapy.         This note was electronically signed.    Dominik Toribio MD  October 25, 2017

## 2017-11-09 ENCOUNTER — OFFICE VISIT (OUTPATIENT)
Dept: OBSTETRICS AND GYNECOLOGY | Facility: CLINIC | Age: 22
End: 2017-11-09

## 2017-11-09 VITALS
WEIGHT: 166 LBS | SYSTOLIC BLOOD PRESSURE: 118 MMHG | DIASTOLIC BLOOD PRESSURE: 72 MMHG | HEIGHT: 67 IN | BODY MASS INDEX: 26.06 KG/M2

## 2017-11-09 DIAGNOSIS — Z30.09 UNWANTED FERTILITY: ICD-10-CM

## 2017-11-09 DIAGNOSIS — N92.1 PROLONGED MENSTRUAL CYCLE: Primary | ICD-10-CM

## 2017-11-09 PROCEDURE — 99214 OFFICE O/P EST MOD 30 MIN: CPT | Performed by: OBSTETRICS & GYNECOLOGY

## 2017-11-09 NOTE — PROGRESS NOTES
"Subjective   No chief complaint on file.    Dorina Stockton is a 22 y.o. year old  who comes to review her recent testing and discuss next steps.  The patient was seen on 10/25/17 with a chief complaint of having heavy prolonged bleeding.  She underwent laboratory work and ultrasound.  Her hormones are within normal limits.  Her thyroid is normal.  Her blood count is normal however her MCV is low consistent with impending anemia.  The ultrasound shows her uterus is slightly enlarged with Y Wade 93.3 cm³.  Each ovary appears to be within normal limits.  We discussed what she could do about her bleeding.    Review of Systems   Constitutional: Negative for activity change, appetite change, chills and fever.   HENT: Negative.    Respiratory: Negative.    Cardiovascular: Negative.    Gastrointestinal: Negative.  Negative for abdominal distention and abdominal pain.   Genitourinary: Positive for menstrual problem. Negative for difficulty urinating, dysuria, flank pain, genital sores, pelvic pain, vaginal bleeding, vaginal discharge and vaginal pain.            Objective   /72  Ht 67\" (170.2 cm)  Wt 166 lb (75.3 kg)  BMI 26 kg/m2    Physical Exam      General:  well developed; well nourished  no acute distress     Thyroid: not examined     Heart:  Not performed.     Lungs:  breathing is unlabored     Breasts:  Not performed.     Abdomen: Not performed.     Pelvis: Not performed.         Lab Review   No visits with results within 2 Week(s) from this visit.  Latest known visit with results is:    Office Visit on 10/25/2017   Component Date Value Ref Range Status   • WBC 10/25/2017 8.43  3.20 - 9.80 10*3/mm3 Final   • RBC 10/25/2017 4.99  3.77 - 5.16 10*6/mm3 Final   • Hemoglobin 10/25/2017 12.7  12.0 - 15.5 g/dL Final   • Hematocrit 10/25/2017 39.6  35.0 - 45.0 % Final   • MCV 10/25/2017 79.4* 80.0 - 98.0 fL Final   • MCH 10/25/2017 25.5* 26.5 - 34.0 pg Final   • MCHC 10/25/2017 32.1  31.4 - 36.0 g/dL " Final   • RDW 10/25/2017 14.5  11.5 - 14.5 % Final   • RDW-SD 10/25/2017 41.8  36.4 - 46.3 fl Final   • MPV 10/25/2017 10.6  8.0 - 12.0 fL Final   • Platelets 10/25/2017 246  150 - 450 10*3/mm3 Final   • Neutrophil % 10/25/2017 59.6  37.0 - 80.0 % Final   • Lymphocyte % 10/25/2017 30.1  10.0 - 50.0 % Final   • Monocyte % 10/25/2017 5.3  0.0 - 12.0 % Final   • Eosinophil % 10/25/2017 4.3  0.0 - 7.0 % Final   • Basophil % 10/25/2017 0.6  0.0 - 2.0 % Final   • Immature Grans % 10/25/2017 0.1  0.0 - 0.5 % Final   • Neutrophils, Absolute 10/25/2017 5.02  2.00 - 8.60 10*3/mm3 Final   • Lymphocytes, Absolute 10/25/2017 2.54  0.60 - 4.20 10*3/mm3 Final   • Monocytes, Absolute 10/25/2017 0.45  0.00 - 0.90 10*3/mm3 Final   • Eosinophils, Absolute 10/25/2017 0.36  0.00 - 0.70 10*3/mm3 Final   • Basophils, Absolute 10/25/2017 0.05  0.00 - 0.20 10*3/mm3 Final   • Immature Grans, Absolute 10/25/2017 0.01  0.00 - 0.02 10*3/mm3 Final   • FSH 10/25/2017 6.60  mIU/mL Final   • LH 10/25/2017 3.87  mIU/mL Final   • Free T4 10/25/2017 1.01  0.78 - 2.19 ng/dL Final   • TSH 10/25/2017 3.700  0.460 - 4.680 mIU/mL Final           Imaging   Pelvic ultrasound report           Assessment   1. Menorrhagia  2. Unwanted fertility     Plan     1. The following options were presented to the patient:Do nothing and live as you are living, Consider using oral contraceptives, Consider using the patch or NuvaRing, Consider using a hormonal IUD such as the jacoby or the mirena, Consider a diagnostic hysteroscopy, Consider a D&C, Consider an endometrial ablation and Consider a hysterectomy by any approach.  2. After much discussion the patient is decided to proceed with an endometrial ablation.  She understands that in order to do an endometrial ablation she also needs to have a permanent method of birth control so she would also like to get her tubes tied.   The patient understands that tubal ligation should be considered permanent and not reversible.   And that if there is any thought in the back of her mind that she would like to  have another child; she should not get her tubes tied.  Although reversal surgeries are possible, they may not work, and are usually not covered by insurance.  She further understands that the only perfect method of birth control is to not engage in intercourse.  All other methods of birth control have a failure rate.  She understands that tubal ligation has a failure rate.  Should she believe that she is pregnant after her surgery, she should do pregnancy test, and if it is positive, we would want to see her right away as she would have a high risk for having a tubal or an ectopic pregnancy.   She will read and sign her tubal papers today.  So she will be scheduled for an exam under anesthesia, laparoscopic tubal fulguration, diagnostic hysteroscopy with fractional D&C, and endometrial ablation.         This note was electronically signed.    Dominik Toribio M.D.  November 9, 2017

## 2017-11-10 ENCOUNTER — PREP FOR SURGERY (OUTPATIENT)
Dept: OTHER | Facility: HOSPITAL | Age: 22
End: 2017-11-10

## 2017-11-10 DIAGNOSIS — Z30.09 UNWANTED FERTILITY: Primary | ICD-10-CM

## 2017-11-10 DIAGNOSIS — N92.0 MENORRHAGIA WITH REGULAR CYCLE: ICD-10-CM

## 2017-11-10 RX ORDER — SODIUM CHLORIDE 0.9 % (FLUSH) 0.9 %
1-10 SYRINGE (ML) INJECTION AS NEEDED
Status: CANCELLED | OUTPATIENT
Start: 2017-12-20

## 2017-12-14 ENCOUNTER — APPOINTMENT (OUTPATIENT)
Dept: PREADMISSION TESTING | Facility: HOSPITAL | Age: 22
End: 2017-12-14

## 2017-12-14 ENCOUNTER — OFFICE VISIT (OUTPATIENT)
Dept: OBSTETRICS AND GYNECOLOGY | Facility: CLINIC | Age: 22
End: 2017-12-14

## 2017-12-14 VITALS
OXYGEN SATURATION: 100 % | DIASTOLIC BLOOD PRESSURE: 68 MMHG | RESPIRATION RATE: 14 BRPM | SYSTOLIC BLOOD PRESSURE: 112 MMHG | BODY MASS INDEX: 26.92 KG/M2 | HEART RATE: 64 BPM | WEIGHT: 171.5 LBS | HEIGHT: 67 IN

## 2017-12-14 VITALS
SYSTOLIC BLOOD PRESSURE: 118 MMHG | BODY MASS INDEX: 27 KG/M2 | WEIGHT: 172 LBS | HEIGHT: 67 IN | DIASTOLIC BLOOD PRESSURE: 70 MMHG

## 2017-12-14 DIAGNOSIS — Z30.09 UNWANTED FERTILITY: Primary | ICD-10-CM

## 2017-12-14 DIAGNOSIS — N92.0 MENORRHAGIA WITH REGULAR CYCLE: ICD-10-CM

## 2017-12-14 DIAGNOSIS — Z30.09 UNWANTED FERTILITY: ICD-10-CM

## 2017-12-14 DIAGNOSIS — N92.1 MENORRHAGIA WITH IRREGULAR CYCLE: ICD-10-CM

## 2017-12-14 LAB
BASOPHILS # BLD AUTO: 0.03 10*3/MM3 (ref 0–0.2)
BASOPHILS NFR BLD AUTO: 0.4 % (ref 0–2)
DEPRECATED RDW RBC AUTO: 45.7 FL (ref 36.4–46.3)
EOSINOPHIL # BLD AUTO: 0.31 10*3/MM3 (ref 0–0.7)
EOSINOPHIL NFR BLD AUTO: 4.5 % (ref 0–7)
ERYTHROCYTE [DISTWIDTH] IN BLOOD BY AUTOMATED COUNT: 15.6 % (ref 11.5–14.5)
HCT VFR BLD AUTO: 38.1 % (ref 35–45)
HGB BLD-MCNC: 12.5 G/DL (ref 12–15.5)
IMM GRANULOCYTES # BLD: 0.01 10*3/MM3 (ref 0–0.02)
IMM GRANULOCYTES NFR BLD: 0.1 % (ref 0–0.5)
LYMPHOCYTES # BLD AUTO: 2.19 10*3/MM3 (ref 0.6–4.2)
LYMPHOCYTES NFR BLD AUTO: 31.6 % (ref 10–50)
MCH RBC QN AUTO: 25.9 PG (ref 26.5–34)
MCHC RBC AUTO-ENTMCNC: 32.8 G/DL (ref 31.4–36)
MCV RBC AUTO: 78.9 FL (ref 80–98)
MONOCYTES # BLD AUTO: 0.51 10*3/MM3 (ref 0–0.9)
MONOCYTES NFR BLD AUTO: 7.3 % (ref 0–12)
NEUTROPHILS # BLD AUTO: 3.89 10*3/MM3 (ref 2–8.6)
NEUTROPHILS NFR BLD AUTO: 56.1 % (ref 37–80)
PLATELET # BLD AUTO: 234 10*3/MM3 (ref 150–450)
PMV BLD AUTO: 9.6 FL (ref 8–12)
RBC # BLD AUTO: 4.83 10*6/MM3 (ref 3.77–5.16)
WBC NRBC COR # BLD: 6.94 10*3/MM3 (ref 3.2–9.8)

## 2017-12-14 PROCEDURE — 85025 COMPLETE CBC W/AUTO DIFF WBC: CPT | Performed by: OBSTETRICS & GYNECOLOGY

## 2017-12-14 PROCEDURE — 99214 OFFICE O/P EST MOD 30 MIN: CPT | Performed by: OBSTETRICS & GYNECOLOGY

## 2017-12-14 PROCEDURE — 36415 COLL VENOUS BLD VENIPUNCTURE: CPT

## 2017-12-14 RX ORDER — SODIUM CHLORIDE, SODIUM GLUCONATE, SODIUM ACETATE, POTASSIUM CHLORIDE AND MAGNESIUM CHLORIDE 526; 502; 368; 37; 30 MG/100ML; MG/100ML; MG/100ML; MG/100ML; MG/100ML
1000 INJECTION, SOLUTION INTRAVENOUS CONTINUOUS
Status: CANCELLED | OUTPATIENT
Start: 2017-12-20

## 2017-12-14 NOTE — DISCHARGE INSTRUCTIONS
[facility]  Pre-op Information and Guidelines    You will be called after 2 p.m. the day before your surgery (Friday for Monday surgery) and notified of your time for arrival and approximate surgery time.  If you have not received a call by 4P.M., please contact Same Day Surgery at [phone] of if outside [facility] call [phone].    Please Follow these Important Safety Guidelines:    • The morning of your procedure, take only the medications listed below with   A sip of water:_____________________________________________       ______________________________________________    • DO NOT eat or drink anything after 12:00 midnight the night before surgery  Specific instructions concerning drinking clear liquids will be discussed during  the pre-surgery instruction call the day before your surgery.    • If you take a blood thinner (ex. Plavix, Coumadin, aspirin), ask your doctor when to stop it before surgery  STOP DATE: _________________    • Only 2 visitors are allowed in patient rooms at a time  Your visitors will be asked to wait in the lobby until the admission process is complete with the exception of a parent with a child and patients in need of special assistance.    • YOU CANNOT DRIVE YOURSELF HOME  You must be accompanied by someone who will be responsible for driving you home after surgery and for your care at home.    • DO NOT chew gum, use breath mints, hard candy, or smoke the day of surgery  • DO NOT drink alcohol for at least 24 hours before your surgery  • DO NOT wear any jewelry and remove all body piercing before coming to the hospital  • DO NOT wear make-up to the hospital  • If you are having surgery on an extremity (arm/leg/foot) remove nail polish/artificial nails on the surgical side  • Clothing, glasses, contacts, dentures, and hairpieces must be removed before surgery  • Bathe the night before or the morning of your surgery and do not use powders/lotions on  skin.

## 2017-12-14 NOTE — H&P
Dorina Stockton  : 1995  MRN: [id number]  [facility]: [id number]    History and Physical    The patient is a 22-year-old  3, para 2-0-1-2, who presents today for a preoperative history and physical prior to undergoing exam under anesthesia, diagnostic hysteroscopy with fractional dilatation and curettage and laparoscopic tubal fulguration.  The patient was originally seen back on 10/25/2017 with a chief complaint of having a heavy prolonged menses that had been ongoing for more than a month.  Laboratory work was done and an ultrasound was performed.  Her thyroid labs were within normal limits.  Her blood count was normal; however, her MCV was low consistent with impending anemia.  The ultrasound showed her uterus was slightly enlarged with a volume of 93.3 cubic centimeters, each ovary appeared normal.  At that time, we discussed our options and she wished to proceed with an ablation, but she has never had her tubes tied, so she desired to also get her tubes tied as well.        Past Medical History:   Diagnosis Date   • Abdominal pain    • Acute bronchitis    • Corneal abrasion    • Dietary counseling and surveillance    • Encounter for  visit    • General counseling and advice on female contraception     would like a gary   • Gestational diabetes mellitus    • Nausea    • Pregnancy test positive     serum   • Tension type headache    • Viral gastroenteritis      OB History      Para Term  AB Living    3 2 2 0 1 2    SAB TAB Ectopic Multiple Live Births    1 0 0 0 2        No past surgical history on file.  Smoking status: Current Every Day Smoker                                                   Packs/day: 0.25      Years: 0.00         Types: Cigarettes  Smokeless status: Never Used                        No current outpatient prescriptions on file.  Prior to Admission medications    Not on File       Allergies   Allergen Reactions   • Sulfa Antibiotics        Review of Systems  "  Constitutional: Negative for activity change, appetite change, chills and fever.   Gastrointestinal: Negative for abdominal distention and abdominal pain.   Genitourinary: Negative for difficulty urinating, dysuria, flank pain, genital sores, pelvic pain, vaginal bleeding, vaginal discharge and vaginal pain.           /70  Ht 170.2 cm (67\")  Wt 78 kg (172 lb)  LMP  (LMP Unknown)  Breastfeeding? No  BMI 26.94 kg/m2  General: well developed; well nourished  no acute distress   thryoid: normal to inspection and palpation   Heart: regular rate and rhythm   Lungs: breathing is unlabored  clear to auscultation bilaterally   Abdomen: soft, non-tender; no masses  no umbilical or inginual hernias are present  no hepato-splenomegaly   Pelvic: Not performed.  deferred to the OR   Neuro: Alert and oriented x 3, reflexes normal and symmetric   Labs  Lab Results   Component Value Date     10/25/2017    HGB 12.7 10/25/2017    HCT 39.6 10/25/2017    WBC 8.43 10/25/2017     06/07/2017    K 3.6 06/07/2017     06/07/2017    CO2 24.0 06/07/2017    BUN 8 06/07/2017    CREATININE 0.71 06/07/2017    GLUCOSE 81 06/07/2017    ALBUMIN 4.30 06/07/2017    CALCIUM 9.3 06/07/2017    AST 16 06/07/2017    ALT 34 06/07/2017    BILITOT 0.3 06/07/2017       Assessment   1. Unwanted fertility  2. Menorrhagia with irregular cycle     Plan   1. The patient will be taken to the operating room to undergo an exam under anesthesia, diagnostic hysteroscopy with fractional dilatation and curettage, and laparoscopic tubal fulguration.      Today I discussed with Dorina the risks, benefits, and alternativesof  her upcoming surgical procedure. The risks that were discussed included, but  were not limited to: pain, bleeding including the need and risks of transfusion to  which she would have no objection, infection at the site of surgery, damage to  the adjacent surrounding organs,  catheter introduced urinary tract infections and "  the small risk for deep vein thrombosis were all explained. Additionally, the small  risk for reoperation in the event of unanticipated bleeding or surgical injury was  discussed.  All of her questions were answered fully.  She left with a very clear  understanding of the preoperative surgical indications and the nature of the  surgery for which she is scheduled.  She understands to be NPO after midnight.            This document has been electronically signed by Dominik Toribio MD on December 14, 2017 11:30 AM

## 2017-12-19 ENCOUNTER — ANESTHESIA EVENT (OUTPATIENT)
Dept: PERIOP | Facility: HOSPITAL | Age: 22
End: 2017-12-19

## 2017-12-20 ENCOUNTER — ANESTHESIA (OUTPATIENT)
Dept: PERIOP | Facility: HOSPITAL | Age: 22
End: 2017-12-20

## 2017-12-20 ENCOUNTER — HOSPITAL ENCOUNTER (OUTPATIENT)
Facility: HOSPITAL | Age: 22
Setting detail: HOSPITAL OUTPATIENT SURGERY
Discharge: HOME | End: 2017-12-20
Attending: OBSTETRICS & GYNECOLOGY | Admitting: OBSTETRICS & GYNECOLOGY

## 2017-12-20 VITALS
TEMPERATURE: 97.4 F | HEIGHT: 67 IN | BODY MASS INDEX: 27.16 KG/M2 | SYSTOLIC BLOOD PRESSURE: 141 MMHG | HEART RATE: 64 BPM | DIASTOLIC BLOOD PRESSURE: 72 MMHG | OXYGEN SATURATION: 98 % | RESPIRATION RATE: 18 BRPM | WEIGHT: 173.06 LBS

## 2017-12-20 DIAGNOSIS — N92.0 MENORRHAGIA WITH REGULAR CYCLE: ICD-10-CM

## 2017-12-20 DIAGNOSIS — Z30.09 UNWANTED FERTILITY: ICD-10-CM

## 2017-12-20 LAB — B-HCG UR QL: NEGATIVE

## 2017-12-20 PROCEDURE — 25010000002 NEOSTIGMINE 10 MG/10ML SOLUTION

## 2017-12-20 PROCEDURE — 25010000002 ONDANSETRON PER 1 MG

## 2017-12-20 PROCEDURE — 25010000002 MIDAZOLAM PER 1 MG

## 2017-12-20 PROCEDURE — 88305 TISSUE EXAM BY PATHOLOGIST: CPT | Performed by: PATHOLOGY

## 2017-12-20 PROCEDURE — 58563 HYSTEROSCOPY ABLATION: CPT | Performed by: OBSTETRICS & GYNECOLOGY

## 2017-12-20 PROCEDURE — 58670 LAPAROSCOPY TUBAL CAUTERY: CPT | Performed by: OBSTETRICS & GYNECOLOGY

## 2017-12-20 PROCEDURE — 81025 URINE PREGNANCY TEST: CPT | Performed by: ANESTHESIOLOGY

## 2017-12-20 PROCEDURE — 88305 TISSUE EXAM BY PATHOLOGIST: CPT | Performed by: OBSTETRICS & GYNECOLOGY

## 2017-12-20 PROCEDURE — 25010000002 PROPOFOL 10 MG/ML EMULSION

## 2017-12-20 PROCEDURE — 25010000002 DEXAMETHASONE PER 1 MG

## 2017-12-20 PROCEDURE — 25010000002 FENTANYL CITRATE (PF) 100 MCG/2ML SOLUTION

## 2017-12-20 RX ORDER — PROMETHAZINE HYDROCHLORIDE 25 MG/1
25 SUPPOSITORY RECTAL ONCE AS NEEDED
Status: DISCONTINUED | OUTPATIENT
Start: 2017-12-20 | End: 2017-12-20 | Stop reason: HOSPADM

## 2017-12-20 RX ORDER — ROCURONIUM BROMIDE 10 MG/ML
INJECTION, SOLUTION INTRAVENOUS AS NEEDED
Status: DISCONTINUED | OUTPATIENT
Start: 2017-12-20 | End: 2017-12-20 | Stop reason: SURG

## 2017-12-20 RX ORDER — OXYCODONE HYDROCHLORIDE AND ACETAMINOPHEN 5; 325 MG/1; MG/1
1 TABLET ORAL ONCE AS NEEDED
Status: DISCONTINUED | OUTPATIENT
Start: 2017-12-20 | End: 2017-12-20 | Stop reason: HOSPADM

## 2017-12-20 RX ORDER — SODIUM CHLORIDE 0.9 % (FLUSH) 0.9 %
1-10 SYRINGE (ML) INJECTION AS NEEDED
Status: DISCONTINUED | OUTPATIENT
Start: 2017-12-20 | End: 2017-12-20 | Stop reason: HOSPADM

## 2017-12-20 RX ORDER — PROPOFOL 10 MG/ML
VIAL (ML) INTRAVENOUS AS NEEDED
Status: DISCONTINUED | OUTPATIENT
Start: 2017-12-20 | End: 2017-12-20 | Stop reason: SURG

## 2017-12-20 RX ORDER — PROMETHAZINE HYDROCHLORIDE 25 MG/ML
12.5 INJECTION, SOLUTION INTRAMUSCULAR; INTRAVENOUS ONCE AS NEEDED
Status: DISCONTINUED | OUTPATIENT
Start: 2017-12-20 | End: 2017-12-20 | Stop reason: HOSPADM

## 2017-12-20 RX ORDER — IBUPROFEN 800 MG/1
800 TABLET ORAL EVERY 6 HOURS
Status: DISCONTINUED | OUTPATIENT
Start: 2017-12-20 | End: 2017-12-20 | Stop reason: HOSPADM

## 2017-12-20 RX ORDER — DEXAMETHASONE SODIUM PHOSPHATE 4 MG/ML
INJECTION, SOLUTION INTRA-ARTICULAR; INTRALESIONAL; INTRAMUSCULAR; INTRAVENOUS; SOFT TISSUE AS NEEDED
Status: DISCONTINUED | OUTPATIENT
Start: 2017-12-20 | End: 2017-12-20 | Stop reason: SURG

## 2017-12-20 RX ORDER — MIDAZOLAM HYDROCHLORIDE 1 MG/ML
INJECTION INTRAMUSCULAR; INTRAVENOUS AS NEEDED
Status: DISCONTINUED | OUTPATIENT
Start: 2017-12-20 | End: 2017-12-20 | Stop reason: SURG

## 2017-12-20 RX ORDER — ONDANSETRON 2 MG/ML
4 INJECTION INTRAMUSCULAR; INTRAVENOUS ONCE AS NEEDED
Status: DISCONTINUED | OUTPATIENT
Start: 2017-12-20 | End: 2017-12-20 | Stop reason: HOSPADM

## 2017-12-20 RX ORDER — IBUPROFEN 600 MG/1
600 TABLET ORAL EVERY 6 HOURS PRN
Qty: 30 TABLET | Refills: 1 | Status: SHIPPED | OUTPATIENT
Start: 2017-12-20 | End: 2020-11-30 | Stop reason: HOSPADM

## 2017-12-20 RX ORDER — GLYCOPYRROLATE 0.2 MG/ML
INJECTION INTRAMUSCULAR; INTRAVENOUS AS NEEDED
Status: DISCONTINUED | OUTPATIENT
Start: 2017-12-20 | End: 2017-12-20 | Stop reason: SURG

## 2017-12-20 RX ORDER — PROMETHAZINE HYDROCHLORIDE 25 MG/1
25 TABLET ORAL ONCE AS NEEDED
Status: DISCONTINUED | OUTPATIENT
Start: 2017-12-20 | End: 2017-12-20 | Stop reason: HOSPADM

## 2017-12-20 RX ORDER — ONDANSETRON 2 MG/ML
INJECTION INTRAMUSCULAR; INTRAVENOUS AS NEEDED
Status: DISCONTINUED | OUTPATIENT
Start: 2017-12-20 | End: 2017-12-20 | Stop reason: SURG

## 2017-12-20 RX ORDER — SODIUM CHLORIDE, SODIUM GLUCONATE, SODIUM ACETATE, POTASSIUM CHLORIDE, AND MAGNESIUM CHLORIDE 526; 502; 368; 37; 30 MG/100ML; MG/100ML; MG/100ML; MG/100ML; MG/100ML
INJECTION, SOLUTION INTRAVENOUS CONTINUOUS PRN
Status: DISCONTINUED | OUTPATIENT
Start: 2017-12-20 | End: 2017-12-20 | Stop reason: SURG

## 2017-12-20 RX ORDER — FENTANYL CITRATE 50 UG/ML
INJECTION, SOLUTION INTRAMUSCULAR; INTRAVENOUS AS NEEDED
Status: DISCONTINUED | OUTPATIENT
Start: 2017-12-20 | End: 2017-12-20 | Stop reason: SURG

## 2017-12-20 RX ORDER — PROMETHAZINE HYDROCHLORIDE 25 MG/ML
6.25 INJECTION, SOLUTION INTRAMUSCULAR; INTRAVENOUS ONCE AS NEEDED
Status: DISCONTINUED | OUTPATIENT
Start: 2017-12-20 | End: 2017-12-20 | Stop reason: HOSPADM

## 2017-12-20 RX ORDER — SODIUM CHLORIDE, SODIUM GLUCONATE, SODIUM ACETATE, POTASSIUM CHLORIDE AND MAGNESIUM CHLORIDE 526; 502; 368; 37; 30 MG/100ML; MG/100ML; MG/100ML; MG/100ML; MG/100ML
1000 INJECTION, SOLUTION INTRAVENOUS CONTINUOUS
Status: DISCONTINUED | OUTPATIENT
Start: 2017-12-20 | End: 2017-12-20 | Stop reason: HOSPADM

## 2017-12-20 RX ORDER — OXYCODONE HYDROCHLORIDE AND ACETAMINOPHEN 5; 325 MG/1; MG/1
1-2 TABLET ORAL EVERY 6 HOURS PRN
Qty: 30 TABLET | Refills: 0 | Status: SHIPPED | OUTPATIENT
Start: 2017-12-20 | End: 2020-01-30

## 2017-12-20 RX ADMIN — SODIUM CHLORIDE, SODIUM GLUCONATE, SODIUM ACETATE, POTASSIUM CHLORIDE AND MAGNESIUM CHLORIDE 1000 ML: 526; 502; 368; 37; 30 INJECTION, SOLUTION INTRAVENOUS at 06:03

## 2017-12-20 RX ADMIN — PROPOFOL 150 MG: 10 INJECTION, EMULSION INTRAVENOUS at 07:17

## 2017-12-20 RX ADMIN — PROPOFOL 50 MG: 10 INJECTION, EMULSION INTRAVENOUS at 07:55

## 2017-12-20 RX ADMIN — FENTANYL CITRATE 50 MCG: 50 INJECTION, SOLUTION INTRAMUSCULAR; INTRAVENOUS at 07:55

## 2017-12-20 RX ADMIN — FENTANYL CITRATE 50 MCG: 50 INJECTION, SOLUTION INTRAMUSCULAR; INTRAVENOUS at 07:20

## 2017-12-20 RX ADMIN — GLYCOPYRROLATE 0.6 MG: 0.2 INJECTION, SOLUTION INTRAMUSCULAR; INTRAVENOUS at 08:00

## 2017-12-20 RX ADMIN — FENTANYL CITRATE 100 MCG: 50 INJECTION, SOLUTION INTRAMUSCULAR; INTRAVENOUS at 07:13

## 2017-12-20 RX ADMIN — PROPOFOL 50 MG: 10 INJECTION, EMULSION INTRAVENOUS at 07:20

## 2017-12-20 RX ADMIN — ROCURONIUM BROMIDE 30 MG: 10 INJECTION INTRAVENOUS at 07:17

## 2017-12-20 RX ADMIN — ONDANSETRON 4 MG: 2 INJECTION INTRAMUSCULAR; INTRAVENOUS at 07:45

## 2017-12-20 RX ADMIN — MIDAZOLAM 2 MG: 1 INJECTION INTRAMUSCULAR; INTRAVENOUS at 07:05

## 2017-12-20 RX ADMIN — SODIUM CHLORIDE, SODIUM GLUCONATE, SODIUM ACETATE, POTASSIUM CHLORIDE, AND MAGNESIUM CHLORIDE: 526; 502; 368; 37; 30 INJECTION, SOLUTION INTRAVENOUS at 07:00

## 2017-12-20 RX ADMIN — ROCURONIUM BROMIDE 10 MG: 10 INJECTION INTRAVENOUS at 07:55

## 2017-12-20 RX ADMIN — DEXAMETHASONE SODIUM PHOSPHATE 4 MG: 4 INJECTION, SOLUTION INTRAMUSCULAR; INTRAVENOUS at 07:25

## 2017-12-20 NOTE — BRIEF OP NOTE
TUBAL COAGULATION LAPAROSCOPIC  Progress Note    Dorina Stockton  12/20/2017    Pre-op Diagnosis:   Menorrhagia with regular cycle [N92.0]  Unwanted fertility [Z30.09]       Post-Op Diagnosis Codes:     * Menorrhagia with regular cycle [N92.0]     * Unwanted fertility [Z30.09]    Procedure/CPT® Codes:      Procedure(s):  EXAMINATION UNDER ANESTHESA, LAPAROSCOPIC TUBAL FULGURATION, DIAGNOISTIC HYSTEROSCOPY WITH FRACTIONAL DILATION AND CURETTAGE ENDOMETRAL ABLATION   HYSTEROSCOPY WITH ENDOMETRIAL ABLATION    Surgeon(s):  MD Dominik Gallegos MD    Anesthesia: General    Staff:   Circulator: Celine Porras RN  Scrub Person: Moraima Jacobson Technician: Yolis Almendarez CST  Assistant: Azeb Aldrich CSA    Estimated Blood Loss: minimal    Urine Voided: * No values recorded between 12/20/2017  7:06 AM and 12/20/2017  8:02 AM *    Specimens:                  ID Type Source Tests Collected by Time Destination   A :  Tissue Endometrial Curettings TISSUE EXAM Dominik Toribio MD 12/20/2017 0802          Drains:           Findings: see op note     Complications: none      Dominik Toribio MD     Date: 12/20/2017  Time: 8:02 AM

## 2017-12-20 NOTE — OP NOTE
PREOPERATIVE DIAGNOSIS:  Menorrhagia with regular cycle, unwanted fertility, desires sterility.      POSTOPERATIVE DIAGNOSIS:  Menorrhagia with regular cycle, unwanted fertility, desires sterility.      PROCEDURES PERFORMED:   1.   Examination under anesthesia.   2.   Diagnostic hysteroscopy with fractional dilatation and curettage.    3.   NovaSure endometrial fulguration.    4.   Laparoscopic tubal fulguration.      SURGEON:  Dominik Toribio MD    ASSISTANT:   Azeb Aldrich CSA     ANESTHESIA:  General.     ESTIMATED BLOOD LOSS:  Minimal.     COMPLICATIONS:  None.      COUNTS:  Correct.      FINDINGS:  On exam under anesthesia, the uterus was felt to be of normal size, shape and consistency.  At hysteroscopy, there was no evidence of polyps or fibroids in the uterus.  At laparoscopy, the uterus, tubes, and ovaries all appeared to be within normal limits.      DESCRIPTION OF PROCEDURE:  After informed consent was obtained and after the operative consent was signed, the patient was brought to the operating room and placed on the operating room table in the supine position in preparation for her surgery.  After an adequate level of general anesthesia was noted, the patient was placed in the yellow fin stirrups and a timeout was performed and exam under anesthesia was performed.  The patient was then routinely prepped and had her bladder drained, prepared and draped in the usual sterile manner.  A weighted-speculum and Jimenez retractor was placed in the patient's vagina.  The cervix was grasped with an Allis clamp.  An endocervical curettage was performed.  The uterus was then sounded to 7 cm and the cervix was dilated to 16-Hebrew.  A hysteroscope was introduced into the endometrial cavity without evidence of polyps or fibroids.  The hysteroscope was removed and the endometrial curettage was performed.  The NovaSure endometrial ablation unit was then brought in, the depth of the cavity was 4 cm and width was 2.6 and  then went through a cycle within 2 minutes.  It was removed, the hysteroscope was replaced.  We noted we had a good burn within the cavity except in the left cornu.  The hysteroscope was removed, the Kronner manipulator was placed. The Allis clamp and weighted-speculum were removed and the surgeon went and rewashed his hands and changed his gown and gloves.  Attention was then directed towards the abdomen where a small skin incision was made in the skin with the knife just below the umbilicus.  We bluntly dissected down to the level of the fascia.  The 11 mm trocar and sleeve was introduced through the incision.  The trocar was removed leaving the sleeve in place.  The laparoscope was placed through the sleeve and noted to be within the abdominal cavity.  The abdominal cavity was then insufflated using additional carbon dioxide and inspection beneath the area of incision revealed no evidence of injury.   The patient was then placed in the steep Trendelenburg position and the uterus was elevated.  Each tube was then photo documented both before and after the fulguration.  Approximately a 3-4 cm segment of each tube was then fulgurated using a bipolar current set at 30 bledsoe to ensure an adequate burn.  Once this was done, the laparoscope was removed.  The air was evacuated through the port site which was removed.  The subcutaneous layer was closed with a 0 Vicryl.  The skin was closed using a 4-0 Monocryl.  The Corona manipulator was removed.  The patient tolerated the procedure well and went to the recovery room in satisfactory condition.            This document has been electronically signed by Dominik Toribio MD on December 20, 2017 8:05 AM

## 2017-12-20 NOTE — ANESTHESIA PROCEDURE NOTES
Airway  Urgency: elective    Airway not difficult    General Information and Staff    Patient location during procedure: OR    Indications and Patient Condition  Indications for airway management: airway protection    Preoxygenated: yes  MILS maintained throughout  Mask difficulty assessment: 0 - not attempted    Final Airway Details  Final airway type: endotracheal airway      Successful airway: ETT  Cuffed: yes   Successful intubation technique: direct laryngoscopy  Blade: Alston  Blade size: #2  ETT size: 7.0 mm  Cormack-Lehane Classification: grade IIa - partial view of glottis  Measured from: gums  Number of attempts at approach: 2

## 2017-12-20 NOTE — ANESTHESIA PREPROCEDURE EVALUATION
Anesthesia Evaluation     Patient summary reviewed and Nursing notes reviewed   NPO Solid Status: > 8 hours  NPO Liquid Status: > 8 hours     Airway   Mallampati: II  TM distance: >3 FB  Neck ROM: full  possible difficult intubation  Dental    (+) poor dentation    Comment: Upper edentulous with lower in poor repair.    Pulmonary - normal exam    breath sounds clear to auscultation  (+) a smoker Current Smoked day of surgery,   Cardiovascular - negative cardio ROS and normal exam    Rhythm: regular  Rate: normal        Neuro/Psych  (+) headaches (Tension type.),    GI/Hepatic/Renal/Endo - negative ROS     Musculoskeletal (-) negative ROS    Abdominal    Substance History - negative use     OB/GYN negative ob/gyn ROS         Other - negative ROS                                       Anesthesia Plan    ASA 2     general     intravenous induction   Anesthetic plan and risks discussed with patient, spouse/significant other and mother.

## 2017-12-20 NOTE — H&P (VIEW-ONLY)
Dorina Stockton  : 1995  MRN: [id number]  [facility]: [id number]    History and Physical    The patient is a 22-year-old  3, para 2-0-1-2, who presents today for a preoperative history and physical prior to undergoing exam under anesthesia, diagnostic hysteroscopy with fractional dilatation and curettage and laparoscopic tubal fulguration.  The patient was originally seen back on 10/25/2017 with a chief complaint of having a heavy prolonged menses that had been ongoing for more than a month.  Laboratory work was done and an ultrasound was performed.  Her thyroid labs were within normal limits.  Her blood count was normal; however, her MCV was low consistent with impending anemia.  The ultrasound showed her uterus was slightly enlarged with a volume of 93.3 cubic centimeters, each ovary appeared normal.  At that time, we discussed our options and she wished to proceed with an ablation, but she has never had her tubes tied, so she desired to also get her tubes tied as well.        Past Medical History:   Diagnosis Date   • Abdominal pain    • Acute bronchitis    • Corneal abrasion    • Dietary counseling and surveillance    • Encounter for  visit    • General counseling and advice on female contraception     would like a gary   • Gestational diabetes mellitus    • Nausea    • Pregnancy test positive     serum   • Tension type headache    • Viral gastroenteritis      OB History      Para Term  AB Living    3 2 2 0 1 2    SAB TAB Ectopic Multiple Live Births    1 0 0 0 2        No past surgical history on file.  Smoking status: Current Every Day Smoker                                                   Packs/day: 0.25      Years: 0.00         Types: Cigarettes  Smokeless status: Never Used                        No current outpatient prescriptions on file.  Prior to Admission medications    Not on File       Allergies   Allergen Reactions   • Sulfa Antibiotics        Review of Systems  "  Constitutional: Negative for activity change, appetite change, chills and fever.   Gastrointestinal: Negative for abdominal distention and abdominal pain.   Genitourinary: Negative for difficulty urinating, dysuria, flank pain, genital sores, pelvic pain, vaginal bleeding, vaginal discharge and vaginal pain.           /70  Ht 170.2 cm (67\")  Wt 78 kg (172 lb)  LMP  (LMP Unknown)  Breastfeeding? No  BMI 26.94 kg/m2  General: well developed; well nourished  no acute distress   thryoid: normal to inspection and palpation   Heart: regular rate and rhythm   Lungs: breathing is unlabored  clear to auscultation bilaterally   Abdomen: soft, non-tender; no masses  no umbilical or inginual hernias are present  no hepato-splenomegaly   Pelvic: Not performed.  deferred to the OR   Neuro: Alert and oriented x 3, reflexes normal and symmetric   Labs  Lab Results   Component Value Date     10/25/2017    HGB 12.7 10/25/2017    HCT 39.6 10/25/2017    WBC 8.43 10/25/2017     06/07/2017    K 3.6 06/07/2017     06/07/2017    CO2 24.0 06/07/2017    BUN 8 06/07/2017    CREATININE 0.71 06/07/2017    GLUCOSE 81 06/07/2017    ALBUMIN 4.30 06/07/2017    CALCIUM 9.3 06/07/2017    AST 16 06/07/2017    ALT 34 06/07/2017    BILITOT 0.3 06/07/2017       Assessment   1. Unwanted fertility  2. Menorrhagia with irregular cycle     Plan   1. The patient will be taken to the operating room to undergo an exam under anesthesia, diagnostic hysteroscopy with fractional dilatation and curettage, and laparoscopic tubal fulguration.      Today I discussed with Dorina the risks, benefits, and alternativesof  her upcoming surgical procedure. The risks that were discussed included, but  were not limited to: pain, bleeding including the need and risks of transfusion to  which she would have no objection, infection at the site of surgery, damage to  the adjacent surrounding organs,  catheter introduced urinary tract infections and  "  the small risk for deep vein thrombosis were all explained. Additionally, the small  risk for reoperation in the event of unanticipated bleeding or surgical injury was  discussed.  All of her questions were answered fully.  She left with a very clear  understanding of the preoperative surgical indications and the nature of the  surgery for which she is scheduled.  She understands to be NPO after midnight.            This document has been electronically signed by Dominik Toribio MD on December 14, 2017 11:30 AM

## 2017-12-20 NOTE — INTERVAL H&P NOTE
H&P reviewed. The patient was examined and there are no changes to the H&P.           This document has been electronically signed by Dominik Toribio MD on December 20, 2017 6:39 AM

## 2017-12-20 NOTE — NURSING NOTE
Called and spoke with Dr. Toribio and he is aware pt is doing well and ready to go home. Informed okay to send pt home.

## 2017-12-20 NOTE — PLAN OF CARE
Problem: Patient Care Overview (Adult)  Goal: Plan of Care Review  Outcome: Ongoing (interventions implemented as appropriate)   12/20/17 0826   Coping/Psychosocial Response Interventions   Plan Of Care Reviewed With patient   Patient Care Overview   Progress improving   Outcome Evaluation   Outcome Summary/Follow up Plan VSS on room air oxygen. Denies having nauesa, tolerating ice chips well. States soreness to abdomen is tolerable at this time. Ready for transfer to [facility].       Problem: Perioperative Period (Adult)  Goal: Signs and Symptoms of Listed Potential Problems Will be Absent or Manageable (Perioperative Period)  Outcome: Ongoing (interventions implemented as appropriate)

## 2017-12-20 NOTE — ANESTHESIA POSTPROCEDURE EVALUATION
Patient: Dorina Stockton    Procedure Summary     Date Anesthesia Start Anesthesia Stop Room / Location    12/20/17 0706   [facility] OR 10 / [facility]       Procedure Diagnosis Surgeon Provider    EXAMINATION UNDER ANESTHESA, LAPAROSCOPIC TUBAL FULGURATION, DIAGNOISTIC HYSTEROSCOPY WITH FRACTIONAL DILATION AND CURETTAGE ENDOMETRAL ABLATION  (Bilateral Abdomen); HYSTEROSCOPY WITH ENDOMETRIAL ABLATION (N/A Uterus) Menorrhagia with regular cycle; Unwanted fertility  (Menorrhagia with regular cycle [N92.0]; Unwanted fertility [Z30.09]) MD Yoni Gallegos MD          Anesthesia Type: general  Last vitals  BP   116/64 (12/20/17 0816)   Temp   97.3 °F (36.3 °C) (12/20/17 0816)   Pulse   81 (12/20/17 0816)   Resp   16 (12/20/17 0816)     SpO2   97 % (12/20/17 0816)     Anesthesia Post Evaluation

## 2017-12-20 NOTE — BRIEF OP NOTE
TUBAL COAGULATION LAPAROSCOPIC  Progress Note    Dorina Stockton  12/20/2017    Pre-op Diagnosis:   Menorrhagia with regular cycle [N92.0]  Unwanted fertility [Z30.09]       Post-Op Diagnosis Codes:     * Menorrhagia with regular cycle [N92.0]     * Unwanted fertility [Z30.09]    Procedure/CPT® Codes:      Procedure(s):  EXAMINATION UNDER ANESTHESA, LAPAROSCOPIC TUBAL FULGURATION, DIAGNOISTIC HYSTEROSCOPY WITH FRACTIONAL DILATION AND CURETTAGE ENDOMETRAL ABLATION   HYSTEROSCOPY WITH ENDOMETRIAL ABLATION    Surgeon(s):  MD Dominik Gallegos MD    Anesthesia: General    Staff:   Circulator: Celine Porras RN  Scrub Person: Moraima Jacobson Technician: Yolis Almendarez CST  Assistant: Azeb Aldrich CSA    Estimated Blood Loss: minimal    Urine Voided: * No values recorded between 12/20/2017  7:06 AM and 12/20/2017  8:01 AM *    Specimens:                A: ECC  B: EMbx      Drains:           Findings: see op note    Complications: none      Dominik Toribio MD     Date: 12/20/2017  Time: 8:01 AM

## 2017-12-20 NOTE — PLAN OF CARE
Problem: Patient Care Overview (Adult)  Goal: Plan of Care Review  Outcome: Outcome(s) achieved Date Met: 12/20/17  Discharge criteria met  Goal: Adult Individualization and Mutuality  Outcome: Outcome(s) achieved Date Met: 12/20/17    Goal: Discharge Needs Assessment  Outcome: Outcome(s) achieved Date Met: 12/20/17      Problem: Perioperative Period (Adult)  Goal: Signs and Symptoms of Listed Potential Problems Will be Absent or Manageable (Perioperative Period)  Outcome: Outcome(s) achieved Date Met: 12/20/17

## 2017-12-21 LAB
LAB AP CASE REPORT: NORMAL
Lab: NORMAL
PATH REPORT.FINAL DX SPEC: NORMAL
PATH REPORT.GROSS SPEC: NORMAL

## 2017-12-21 NOTE — ANESTHESIA POSTPROCEDURE EVALUATION
Patient: Dorina Stockton    Procedure Summary     Date Anesthesia Start Anesthesia Stop Room / Location    12/20/17 0706 0818 [facility] OR 10 / [facility] OR       Procedure Diagnosis Surgeon Provider    EXAMINATION UNDER ANESTHESA, LAPAROSCOPIC TUBAL FULGURATION, DIAGNOISTIC HYSTEROSCOPY WITH FRACTIONAL DILATION AND CURETTAGE ENDOMETRAL ABLATION  (Bilateral Abdomen); HYSTEROSCOPY WITH ENDOMETRIAL ABLATION (N/A Uterus) Menorrhagia with regular cycle; Unwanted fertility  (Menorrhagia with regular cycle [N92.0]; Unwanted fertility [Z30.09]) MD Yoni Gallegos MD          Anesthesia Type: general  Last vitals  BP   141/72 (12/20/17 0913)   Temp   97.4 °F (36.3 °C) (12/20/17 0913)   Pulse   64 (12/20/17 0913)   Resp   18 (12/20/17 0913)     SpO2   98 % (12/20/17 0913)     Post Anesthesia Care and Evaluation    Patient location during evaluation: bedside  Patient participation: complete - patient participated  Level of consciousness: awake and alert  Pain score: 0  Pain management: adequate  Airway patency: patent  Anesthetic complications: No anesthetic complications    Cardiovascular status: acceptable  Respiratory status: acceptable  Hydration status: acceptable

## 2020-01-30 ENCOUNTER — OFFICE VISIT (OUTPATIENT)
Dept: OBSTETRICS AND GYNECOLOGY | Facility: CLINIC | Age: 25
End: 2020-01-30

## 2020-01-30 VITALS
SYSTOLIC BLOOD PRESSURE: 122 MMHG | BODY MASS INDEX: 25.15 KG/M2 | HEIGHT: 67 IN | WEIGHT: 160.2 LBS | DIASTOLIC BLOOD PRESSURE: 81 MMHG

## 2020-01-30 DIAGNOSIS — N93.9 ABNORMAL UTERINE BLEEDING (AUB): ICD-10-CM

## 2020-01-30 DIAGNOSIS — N92.0 MENORRHAGIA WITH REGULAR CYCLE: Primary | ICD-10-CM

## 2020-01-30 DIAGNOSIS — R10.2 PELVIC PAIN: ICD-10-CM

## 2020-01-30 PROCEDURE — 99213 OFFICE O/P EST LOW 20 MIN: CPT | Performed by: OBSTETRICS & GYNECOLOGY

## 2020-01-30 RX ORDER — NORGESTIMATE AND ETHINYL ESTRADIOL 0.25-0.035
1 KIT ORAL DAILY
Qty: 28 TABLET | Refills: 12 | Status: SHIPPED | OUTPATIENT
Start: 2020-01-30 | End: 2020-02-25

## 2020-01-30 NOTE — PROGRESS NOTES
Dorina Stockton is a 25 y.o. y/o female.     Chief Complaint: Pelvic pain and heavy bleeding    HPI:   25 y.o. .  No LMP recorded..  Patient presents for initial evaluation of pelvic pain and heavy bleeding.  Patient states that this is been going on for several months now.  Pain is significantly worse during her periods and is a sharp shooting pain and is becoming intolerable.  Patient is currently using a tubal for contraception.  This pain is relatively new and has begun in the last several months.  Also states that her bleeding has become heavier and seems to make the pain worse.     Review of Systems   Constitutional: Negative for chills, fatigue and fever.   HENT: Negative for sore throat.    Eyes: Negative for visual disturbance.   Respiratory: Negative for cough, shortness of breath and wheezing.    Cardiovascular: Negative for chest pain, palpitations and leg swelling.   Gastrointestinal: Negative for abdominal pain, diarrhea, nausea and vomiting.   Genitourinary: Positive for menstrual problem, pelvic pain and vaginal bleeding. Negative for dysuria, flank pain, frequency, vaginal discharge and vaginal pain.   Neurological: Negative for syncope, light-headedness and headaches.   Psychiatric/Behavioral: Negative for dysphoric mood and suicidal ideas. The patient is not nervous/anxious.         The following portions of the patient's history were reviewed and updated as appropriate: allergies, current medications, past family history, past medical history, past social history, past surgical history and problem list.    Allergies   Allergen Reactions   • Sulfa Antibiotics Hives        Prior to Admission medications    Medication Sig Start Date End Date Taking? Authorizing Provider   ibuprofen (ADVIL,MOTRIN) 600 MG tablet Take 1 tablet by mouth Every 6 (Six) Hours As Needed for Mild Pain . 17  Yes Dominik Toribio MD   norgestimate-ethinyl estradiol (SPRINTEC 28) 0.25-35 MG-MCG per tablet  Take 1 tablet by mouth Daily. 20   Ricardo Orellana,    oxyCODONE-acetaminophen (PERCOCET) 5-325 MG per tablet Take 1-2 tablets by mouth Every 6 (Six) Hours As Needed (Pain). 17  Dominik Toribio MD        The patient has a family history of   Family History   Problem Relation Age of Onset   • Other Father         carbon monoxide poisoning   • Cancer Other    • Diabetes Other    • Hypertension Other    • Stroke Other    • Cataracts Maternal Grandmother    • Cataracts Maternal Grandfather    • Cataracts Paternal Grandmother    • Cataracts Paternal Grandfather         Past Medical History:   Diagnosis Date   • Abdominal pain    • Acute bronchitis    • Corneal abrasion    • Dietary counseling and surveillance    • Encounter for  visit    • General counseling and advice on female contraception     would like a gary   • Gestational diabetes mellitus    • Nausea    • Pregnancy test positive     serum   • Tension type headache    • Viral gastroenteritis         OB History        3    Para   2    Term   2       0    AB   1    Living   2       SAB   1    TAB   0    Ectopic   0    Molar   0    Multiple   0    Live Births   2                 Social History     Socioeconomic History   • Marital status: Single     Spouse name: Not on file   • Number of children: Not on file   • Years of education: Not on file   • Highest education level: Not on file   Tobacco Use   • Smoking status: Current Every Day Smoker     Packs/day: 0.25     Years: 12.00     Pack years: 3.00     Types: Cigarettes   • Smokeless tobacco: Never Used   Substance and Sexual Activity   • Alcohol use: No   • Drug use: No   • Sexual activity: Defer     Partners: Male     Birth control/protection: None        Past Surgical History:   Procedure Laterality Date   •  SECTION     • EAR TUBES     • HIP SURGERY Left    • [place] HYSTEROSCOPY,W/ENDOMETRIAL ABLATION N/A 2017    Procedure: HYSTEROSCOPY  "WITH ENDOMETRIAL ABLATION;  Surgeon: Dominik Toribio MD;  Location: [facility];  Service: Obstetrics/Gynecology   • TONSILLECTOMY      T & A   • TUBAL COAGULATION LAPAROSCOPIC Bilateral 2017    Procedure: EXAMINATION UNDER ANESTHESA, LAPAROSCOPIC TUBAL FULGURATION, DIAGNOISTIC HYSTEROSCOPY WITH FRACTIONAL DILATION AND CURETTAGE ENDOMETRAL ABLATION ;  Surgeon: Dominik Toribio MD;  Location: [facility];  Service:         Patient Active Problem List   Diagnosis   • Previous  delivery affecting pregnancy   • Hx successful  (vaginal birth after ), currently pregnant   • Gestational diabetes mellitus   • Menorrhagia with regular cycle   • Unwanted fertility        Documented Vitals    20 1038   BP: 122/81   Weight: 72.7 kg (160 lb 3.2 oz)   Height: 170.2 cm (67\")        Body mass index is 25.09 kg/m².    Physical Exam   Constitutional: She is oriented to person, place, and time. She appears well-developed and well-nourished. No distress.   HENT:   Head: Normocephalic.   Cardiovascular: Normal rate, regular rhythm, normal heart sounds and intact distal pulses.   No murmur heard.  Pulmonary/Chest: Effort normal and breath sounds normal. No respiratory distress. She has no wheezes.   Abdominal: Soft. Bowel sounds are normal. She exhibits no distension and no mass. There is no tenderness. There is no rebound and no guarding.   Musculoskeletal: Normal range of motion. She exhibits no edema, tenderness or deformity.   Neurological: She is alert and oriented to person, place, and time.   Skin: Skin is warm and dry. She is not diaphoretic. No erythema.   Psychiatric: She has a normal mood and affect. Her behavior is normal. Judgment and thought content normal.   Vitals reviewed.      Laboratory Data:   No results for input(s): GLUCOSE, BUN, CREATININE, NA, K, CL, CO2, CALCIUM, PROTEINTOT, ALBUMIN, ALT, AST, ALKPHOS, BILITOT, EGFRIFNONA, GLOB, AGRATIO, BCR, ANIONGAP, BILIDIR, INDBILI in the last " 95554 hours.  No results for input(s): WBC, RBC, HGB, HCT, MCV, MCH, MCHC, RDW, RDWSD, MPV, PLT in the last 77659 hours.  No results for input(s): HCGQUAL in the last 08558 hours.    Assessment   Patient with pelvic pain likely secondary to heavy menstruation.  Plan to have patient get pelvic ultrasound to evaluate for any anatomic causes of her pain.  We will also have patient start on birth control pills to see if this will help regulate her bleeding and her pain.  Patient is also in need of a Pap smear however did not feel comfortable doing that today.  We will plan to have patient return to see me in approximately 4 weeks to see how pain is doing on birth control pills and to follow-up on ultrasound results.  We will also do Pap smear at that time.  Patient to return sooner as needed.     Diagnosis Plan   1. Menorrhagia with regular cycle  US Non-ob Transvaginal   2. Pelvic pain     3. Abnormal uterine bleeding (AUB)           Plan         New Medications Ordered This Visit   Medications   • norgestimate-ethinyl estradiol (SPRINTEC 28) 0.25-35 MG-MCG per tablet     Sig: Take 1 tablet by mouth Daily.     Dispense:  28 tablet     Refill:  12             This document has been electronically signed by Ricardo Orellana DO on January 30, 2020 12:33 PM

## 2020-02-19 ENCOUNTER — OFFICE VISIT (OUTPATIENT)
Dept: OBSTETRICS AND GYNECOLOGY | Facility: CLINIC | Age: 25
End: 2020-02-19

## 2020-02-19 VITALS
WEIGHT: 164 LBS | DIASTOLIC BLOOD PRESSURE: 65 MMHG | SYSTOLIC BLOOD PRESSURE: 104 MMHG | BODY MASS INDEX: 25.74 KG/M2 | HEIGHT: 67 IN

## 2020-02-19 DIAGNOSIS — N93.9 ABNORMAL UTERINE BLEEDING (AUB): ICD-10-CM

## 2020-02-19 DIAGNOSIS — R10.2 PELVIC PAIN: ICD-10-CM

## 2020-02-19 DIAGNOSIS — Z98.890 HISTORY OF ENDOMETRIAL ABLATION: ICD-10-CM

## 2020-02-19 DIAGNOSIS — N94.10 FEMALE DYSPAREUNIA: ICD-10-CM

## 2020-02-19 DIAGNOSIS — N83.202 LEFT OVARIAN CYST: ICD-10-CM

## 2020-02-19 DIAGNOSIS — N92.0 MENORRHAGIA WITH REGULAR CYCLE: Primary | ICD-10-CM

## 2020-02-19 PROCEDURE — 99213 OFFICE O/P EST LOW 20 MIN: CPT | Performed by: OBSTETRICS & GYNECOLOGY

## 2020-02-20 PROBLEM — R10.2 PELVIC PAIN: Status: ACTIVE | Noted: 2020-02-20

## 2020-02-20 PROBLEM — O34.219 HX SUCCESSFUL VBAC (VAGINAL BIRTH AFTER CESAREAN), CURRENTLY PREGNANT: Status: RESOLVED | Noted: 2017-01-04 | Resolved: 2020-02-20

## 2020-02-20 PROBLEM — Z98.890 HISTORY OF ENDOMETRIAL ABLATION: Status: ACTIVE | Noted: 2020-02-20

## 2020-02-20 PROBLEM — N83.202 LEFT OVARIAN CYST: Status: ACTIVE | Noted: 2020-02-20

## 2020-02-20 PROBLEM — N94.10 FEMALE DYSPAREUNIA: Status: ACTIVE | Noted: 2020-02-20

## 2020-02-20 PROBLEM — N93.9 ABNORMAL UTERINE BLEEDING (AUB): Status: ACTIVE | Noted: 2020-02-20

## 2020-02-20 RX ORDER — SODIUM CHLORIDE 0.9 % (FLUSH) 0.9 %
10 SYRINGE (ML) INJECTION AS NEEDED
Status: CANCELLED | OUTPATIENT
Start: 2020-03-02

## 2020-02-20 RX ORDER — SODIUM CHLORIDE, SODIUM LACTATE, POTASSIUM CHLORIDE, CALCIUM CHLORIDE 600; 310; 30; 20 MG/100ML; MG/100ML; MG/100ML; MG/100ML
125 INJECTION, SOLUTION INTRAVENOUS CONTINUOUS
Status: CANCELLED | OUTPATIENT
Start: 2020-03-02

## 2020-02-20 RX ORDER — SODIUM CHLORIDE 0.9 % (FLUSH) 0.9 %
3 SYRINGE (ML) INJECTION EVERY 12 HOURS SCHEDULED
Status: CANCELLED | OUTPATIENT
Start: 2020-03-02

## 2020-02-20 RX ORDER — BUPIVACAINE HCL/0.9 % NACL/PF 0.1 %
2 PLASTIC BAG, INJECTION (ML) EPIDURAL ONCE
Status: CANCELLED | OUTPATIENT
Start: 2020-03-02 | End: 2020-02-20

## 2020-02-20 NOTE — PROGRESS NOTES
Dorina Stockton is a 25 y.o. y/o female.     Chief Complaint: Follow-up on pelvic pain    HPI:   25 y.o. .  Patient's last menstrual period was 2020..  Patient presents for follow-up on pelvic pain today.  Patient had started on oral contraceptive pills at last visit, and is not getting any relief from the pain.  Patient had ultrasound today which was overall normal except for a 4 cm cyst that was noted on the left ovary.  Patient has had an ablation in the past and is still having heavy bleeding which is bothersome and seems to make her pain worse.  Given that the patient is not getting relief of her pelvic pain and bleeding with oral contraceptive pills and that she has failed endometrial ablation, my recommendation was for more definitive treatment via hysterectomy.  Plan is for total laparoscopic hysterectomy, however given her history of ablation placing the uterine manipulator may be difficult.  Endometrial lining was noted to be thickened on ultrasound.  If unable to place uterine manipulator patient is aware that hysterectomy may need to be an abdominal hysterectomy.  Patient felt comfortable with this plan.  Plan for patient to go undergo hysterectomy on  either laparoscopic or abdominal based on findings at the time of surgery.  No endometrial sampling is warranted at this time as patient has had ablation.    Patient requests laparoscopic hysterectomy with left salpingo-oophorectomy and right salpingectomy.  Patient understands the possibility of needing to do a total abdominal hysterectomy with left salpingo-oophorectomy and right salpingectomy given her history of ablation and previous .. She understands the risk up to and including death. She understands the risk of serious urologic morbidity such as vesico vaginal fistula, damage to the ureter and or bladder with possible need for additional surgery and low possibility of nephrectomy and/or extended morbidity. She  understands the risk of damage to bowel possible colostomy. She understands the risk of damage to bowel undetected at time of procedure with sepsis and/or need returned OR.  I discussed the risk of bleeding with the patient and possible risk of blood transfusion.  Blood products carry risk of HIV, infection, hepatitis, and transfusion reaction. Patient is willing to accept blood products. She understands the risk of delayed hemorrhage with possible need to return to the OR. She understands the risk of hematoma formation. She understands the risk of infection in the abdominal wall, pelvis, abdomen and other parts of the body. She understands the alternatives of medical therapy and the risks and alternatives. Her questions are answered at length. She understands that there is a  possibility that we may need to convert this to a total abdominal hysterectomy and she accepts this. Patient expressed understanding and desires to proceed with surgery.    Note from first visit: Patient presents for initial evaluation of pelvic pain and heavy bleeding.  Patient states that this is been going on for several months now.  Pain is significantly worse during her periods and is a sharp shooting pain and is becoming intolerable.  Patient is currently using a tubal for contraception.  This pain is relatively new and has begun in the last several months.  Also states that her bleeding has become heavier and seems to make the pain worse.     Review of Systems   Constitutional: Negative for chills, fatigue and fever.   HENT: Negative for sore throat.    Eyes: Negative for visual disturbance.   Respiratory: Negative for cough, shortness of breath and wheezing.    Cardiovascular: Negative for chest pain, palpitations and leg swelling.   Gastrointestinal: Negative for abdominal pain, diarrhea, nausea and vomiting.   Genitourinary: Positive for menstrual problem, pelvic pain and vaginal bleeding. Negative for dysuria, flank pain, frequency,  vaginal discharge and vaginal pain.   Neurological: Negative for syncope, light-headedness and headaches.   Psychiatric/Behavioral: Negative for dysphoric mood and suicidal ideas. The patient is not nervous/anxious.         The following portions of the patient's history were reviewed and updated as appropriate: allergies, current medications, past family history, past medical history, past social history, past surgical history and problem list.    Allergies   Allergen Reactions   • Sulfa Antibiotics Hives        Prior to Admission medications    Medication Sig Start Date End Date Taking? Authorizing Provider   ibuprofen (ADVIL,MOTRIN) 600 MG tablet Take 1 tablet by mouth Every 6 (Six) Hours As Needed for Mild Pain . 17  Yes Dominik Toribio MD   norgestimate-ethinyl estradiol (SPRINTEC 28) 0.25-35 MG-MCG per tablet Take 1 tablet by mouth Daily. 20  Yes Ricardo Orellana, DO        The patient has a family history of   Family History   Problem Relation Age of Onset   • Other Father         carbon monoxide poisoning   • Cancer Other    • Diabetes Other    • Hypertension Other    • Stroke Other    • Cataracts Maternal Grandmother    • Cataracts Maternal Grandfather    • Cataracts Paternal Grandmother    • Cataracts Paternal Grandfather         Past Medical History:   Diagnosis Date   • Abdominal pain    • Acute bronchitis    • Corneal abrasion    • Dietary counseling and surveillance    • Encounter for  visit    • General counseling and advice on female contraception     would like a gary   • Gestational diabetes mellitus    • Nausea    • Pregnancy test positive     serum   • Tension type headache    • Viral gastroenteritis         OB History        3    Para   2    Term   2       0    AB   1    Living   2       SAB   1    TAB   0    Ectopic   0    Molar   0    Multiple   0    Live Births   2                 Social History     Socioeconomic History   • Marital status: Single  "    Spouse name: Not on file   • Number of children: Not on file   • Years of education: Not on file   • Highest education level: Not on file   Tobacco Use   • Smoking status: Current Every Day Smoker     Packs/day: 0.25     Years: 12.00     Pack years: 3.00     Types: Cigarettes   • Smokeless tobacco: Never Used   Substance and Sexual Activity   • Alcohol use: No   • Drug use: No   • Sexual activity: Defer     Partners: Male     Birth control/protection: None        Past Surgical History:   Procedure Laterality Date   •  SECTION     • EAR TUBES     • HIP SURGERY Left    • [place] HYSTEROSCOPY,W/ENDOMETRIAL ABLATION N/A 2017    Procedure: HYSTEROSCOPY WITH ENDOMETRIAL ABLATION;  Surgeon: Dominik Toribio MD;  Location: [facility];  Service: Obstetrics/Gynecology   • TONSILLECTOMY      T & A   • TUBAL COAGULATION LAPAROSCOPIC Bilateral 2017    Procedure: EXAMINATION UNDER ANESTHESA, LAPAROSCOPIC TUBAL FULGURATION, DIAGNOISTIC HYSTEROSCOPY WITH FRACTIONAL DILATION AND CURETTAGE ENDOMETRAL ABLATION ;  Surgeon: Dominik Toribio MD;  Location: [facility];  Service:         Patient Active Problem List   Diagnosis   • Menorrhagia with regular cycle   • Unwanted fertility        Documented Vitals    20 1054   BP: 104/65   Weight: 74.4 kg (164 lb)   Height: 170.2 cm (67\")   PainSc: 0-No pain        Body mass index is 25.69 kg/m².    Physical Exam   Constitutional: She is oriented to person, place, and time. She appears well-developed and well-nourished. No distress.   HENT:   Head: Normocephalic.   Neck: No thyromegaly present.   Cardiovascular: Normal rate, regular rhythm, normal heart sounds and intact distal pulses.   No murmur heard.  Pulmonary/Chest: Effort normal and breath sounds normal. No respiratory distress. She has no wheezes.   Abdominal: Soft. Bowel sounds are normal. She exhibits no distension and no mass. There is no tenderness. There is no rebound and no guarding. "   Pfannenstiel skin incision noted   Genitourinary: Vagina normal and uterus normal. No vaginal discharge found.   Musculoskeletal: Normal range of motion. She exhibits no edema, tenderness or deformity.   Neurological: She is alert and oriented to person, place, and time.   Skin: Skin is warm and dry. No erythema.   Psychiatric: She has a normal mood and affect. Her behavior is normal. Judgment and thought content normal.   Vitals reviewed.    Study Result        Transvaginal pelvic ultrasound non-OB complete     HISTORY: Abnormal uterine bleeding. Excessive and frequent  menstruation with regular cycle.     Transvaginal ultrasound of the pelvis was performed.     COMPARISON: November 9, 2017.     FINDINGS:  The uterus is normal in position, shape and size.  The uterus measures 8.56 cm in length by 3.95 cm AP by 5.84 cm  transverse.  Uterine volume 103.44 mL.  Endometrium thickened to 11.5 mm.     Unremarkable right ovary.  4.11 cm simple appearing left ovarian cyst.  Right ovarian volume 9.06 mL.  Left ovarian benign 34.99 mL.  No free fluid.     IMPRESSION:  CONCLUSION:  Endometrium thickened to 11.5 mm.  Unremarkable right ovary.  4.11 cm simple appearing left ovarian cyst.  No ultrasound follow-up recommended for this cyst.     [id number]     Electronically signed by:  Yefri Alston MD  2/19/2020 2:58 PM [employer]  Workstation: [employer]       Assessment   Patient with abnormal uterine bleeding and pelvic pain with dyspareunia and menorrhagia despite oral contraceptive pill treatment and history of endometrial ablation given that the patient has failed ablation and conservative treatment recommend that she undergo hysterectomy on 2 March.  We will plan for total laparoscopic hysterectomy however given her history of ablation there is a chance that manipulator placement may be difficult if this is the case we will convert to a total abdominal hysterectomy.  Patient is aware that this is a possibility.  To return to  see me in 1 week as she was bleeding today and Pap smear was unable to be obtained.  Patient is aware that if Pap smear is significantly abnormal may need to postpone surgery until evaluation of cervix can be completed.  States that she has never had any abnormal Pap smears.  Patient to return to see me 1 week after surgery, sooner as needed.     Diagnosis Plan   1. Menorrhagia with regular cycle     2. Abnormal uterine bleeding (AUB)     3. Pelvic pain     4. Female dyspareunia     5. Left ovarian cyst     6. History of endometrial ablation               This document has been electronically signed by Ricardo Orellana DO on February 20, 2020 9:05 AM

## 2020-02-25 ENCOUNTER — APPOINTMENT (OUTPATIENT)
Dept: PREADMISSION TESTING | Facility: HOSPITAL | Age: 25
End: 2020-02-25

## 2020-02-25 VITALS
WEIGHT: 164.5 LBS | OXYGEN SATURATION: 100 % | DIASTOLIC BLOOD PRESSURE: 86 MMHG | HEIGHT: 67 IN | RESPIRATION RATE: 16 BRPM | SYSTOLIC BLOOD PRESSURE: 146 MMHG | HEART RATE: 86 BPM | BODY MASS INDEX: 25.82 KG/M2

## 2020-02-25 DIAGNOSIS — R10.2 PELVIC PAIN: ICD-10-CM

## 2020-02-25 DIAGNOSIS — Z98.890 HISTORY OF ENDOMETRIAL ABLATION: ICD-10-CM

## 2020-02-25 DIAGNOSIS — N92.0 MENORRHAGIA WITH REGULAR CYCLE: ICD-10-CM

## 2020-02-25 DIAGNOSIS — N83.202 LEFT OVARIAN CYST: ICD-10-CM

## 2020-02-25 DIAGNOSIS — N93.9 ABNORMAL UTERINE BLEEDING (AUB): ICD-10-CM

## 2020-02-25 DIAGNOSIS — N94.10 FEMALE DYSPAREUNIA: ICD-10-CM

## 2020-02-25 LAB
ABO GROUP BLD: NORMAL
ANION GAP SERPL CALCULATED.3IONS-SCNC: 14 MMOL/L (ref 5–15)
BASOPHILS # BLD AUTO: 0.06 10*3/MM3 (ref 0–0.2)
BASOPHILS NFR BLD AUTO: 0.6 % (ref 0–1.5)
BLD GP AB SCN SERPL QL: NEGATIVE
BUN BLD-MCNC: 11 MG/DL (ref 6–20)
BUN/CREAT SERPL: 15.1 (ref 7–25)
CALCIUM SPEC-SCNC: 9.5 MG/DL (ref 8.6–10.5)
CHLORIDE SERPL-SCNC: 104 MMOL/L (ref 98–107)
CO2 SERPL-SCNC: 23 MMOL/L (ref 22–29)
CREAT BLD-MCNC: 0.73 MG/DL (ref 0.57–1)
DEPRECATED RDW RBC AUTO: 40.4 FL (ref 37–54)
EOSINOPHIL # BLD AUTO: 0.28 10*3/MM3 (ref 0–0.4)
EOSINOPHIL NFR BLD AUTO: 3 % (ref 0.3–6.2)
ERYTHROCYTE [DISTWIDTH] IN BLOOD BY AUTOMATED COUNT: 13.2 % (ref 12.3–15.4)
GFR SERPL CREATININE-BSD FRML MDRD: 96 ML/MIN/1.73
GLUCOSE BLD-MCNC: 107 MG/DL (ref 65–99)
HCT VFR BLD AUTO: 43 % (ref 34–46.6)
HGB BLD-MCNC: 13.8 G/DL (ref 12–15.9)
IMM GRANULOCYTES # BLD AUTO: 0.02 10*3/MM3 (ref 0–0.05)
IMM GRANULOCYTES NFR BLD AUTO: 0.2 % (ref 0–0.5)
LYMPHOCYTES # BLD AUTO: 2.24 10*3/MM3 (ref 0.7–3.1)
LYMPHOCYTES NFR BLD AUTO: 24.2 % (ref 19.6–45.3)
Lab: NORMAL
MCH RBC QN AUTO: 27.2 PG (ref 26.6–33)
MCHC RBC AUTO-ENTMCNC: 32.1 G/DL (ref 31.5–35.7)
MCV RBC AUTO: 84.8 FL (ref 79–97)
MONOCYTES # BLD AUTO: 0.43 10*3/MM3 (ref 0.1–0.9)
MONOCYTES NFR BLD AUTO: 4.6 % (ref 5–12)
NEUTROPHILS # BLD AUTO: 6.24 10*3/MM3 (ref 1.7–7)
NEUTROPHILS NFR BLD AUTO: 67.4 % (ref 42.7–76)
NRBC BLD AUTO-RTO: 0 /100 WBC (ref 0–0.2)
PLATELET # BLD AUTO: 248 10*3/MM3 (ref 140–450)
PMV BLD AUTO: 10.8 FL (ref 6–12)
POTASSIUM BLD-SCNC: 4.2 MMOL/L (ref 3.5–5.2)
RBC # BLD AUTO: 5.07 10*6/MM3 (ref 3.77–5.28)
RH BLD: POSITIVE
SODIUM BLD-SCNC: 141 MMOL/L (ref 136–145)
T&S EXPIRATION DATE: NORMAL
WBC NRBC COR # BLD: 9.27 10*3/MM3 (ref 3.4–10.8)

## 2020-02-25 PROCEDURE — 86850 RBC ANTIBODY SCREEN: CPT | Performed by: ANESTHESIOLOGY

## 2020-02-25 PROCEDURE — 86901 BLOOD TYPING SEROLOGIC RH(D): CPT | Performed by: ANESTHESIOLOGY

## 2020-02-25 PROCEDURE — 86900 BLOOD TYPING SEROLOGIC ABO: CPT | Performed by: ANESTHESIOLOGY

## 2020-02-25 PROCEDURE — 80048 BASIC METABOLIC PNL TOTAL CA: CPT | Performed by: OBSTETRICS & GYNECOLOGY

## 2020-02-25 PROCEDURE — 85025 COMPLETE CBC W/AUTO DIFF WBC: CPT | Performed by: OBSTETRICS & GYNECOLOGY

## 2020-02-25 PROCEDURE — 36415 COLL VENOUS BLD VENIPUNCTURE: CPT

## 2020-02-25 NOTE — DISCHARGE INSTRUCTIONS
[facility]  Pre-op Information and Guidelines    You will be called after 2 p.m. the day before your surgery (Friday for Monday surgery) and notified of your time for arrival and approximate surgery time.  If you have not received a call by 4P.M., please contact Same Day Surgery at [phone] of if outside [facility] call [phone].    Please Follow these Important Safety Guidelines:    • The morning of your procedure, take only the medications listed below with   A sip of water:_____________________________________________       ______________________________________________    • DO NOT eat or drink anything after 12:00 midnight the night before surgery  Specific instructions concerning drinking clear liquids will be discussed during  the pre-surgery instruction call the day before your surgery.    • If you take a blood thinner (ex. Plavix, Coumadin, aspirin), ask your doctor when to stop it before surgery  STOP DATE: _________________    • Only 2 visitors are allowed in patient rooms at a time  Your visitors will be asked to wait in the lobby until the admission process is complete with the exception of a parent with a child and patients in need of special assistance.    • YOU CANNOT DRIVE YOURSELF HOME  You must be accompanied by someone who will be responsible for driving you home after surgery and for your care at home.    • DO NOT chew gum, use breath mints, hard candy, or smoke the day of surgery  • DO NOT drink alcohol for at least 24 hours before your surgery  • DO NOT wear any jewelry and remove all body piercing before coming to the hospital  • DO NOT wear make-up to the hospital  • If you are having surgery on an extremity (arm/leg/foot) remove nail polish/artificial nails on the surgical side  • Clothing, glasses, contacts, dentures, and hairpieces must be removed before surgery  • Bathe the night before or the morning of your surgery and do not use powders/lotions on  skin.

## 2020-02-26 ENCOUNTER — OFFICE VISIT (OUTPATIENT)
Dept: OBSTETRICS AND GYNECOLOGY | Facility: CLINIC | Age: 25
End: 2020-02-26

## 2020-02-26 VITALS
BODY MASS INDEX: 26.53 KG/M2 | WEIGHT: 169 LBS | SYSTOLIC BLOOD PRESSURE: 118 MMHG | HEIGHT: 67 IN | DIASTOLIC BLOOD PRESSURE: 72 MMHG

## 2020-02-26 DIAGNOSIS — N94.10 FEMALE DYSPAREUNIA: ICD-10-CM

## 2020-02-26 DIAGNOSIS — Z98.890 HISTORY OF ENDOMETRIAL ABLATION: ICD-10-CM

## 2020-02-26 DIAGNOSIS — N92.0 MENORRHAGIA WITH REGULAR CYCLE: ICD-10-CM

## 2020-02-26 DIAGNOSIS — Z01.419 ENCOUNTER FOR CERVICAL PAP SMEAR WITH PELVIC EXAM: Primary | ICD-10-CM

## 2020-02-26 DIAGNOSIS — N93.9 ABNORMAL UTERINE BLEEDING (AUB): ICD-10-CM

## 2020-02-26 DIAGNOSIS — N83.202 LEFT OVARIAN CYST: ICD-10-CM

## 2020-02-26 DIAGNOSIS — R10.2 PELVIC PAIN: ICD-10-CM

## 2020-02-26 PROCEDURE — G0123 SCREEN CERV/VAG THIN LAYER: HCPCS | Performed by: OBSTETRICS & GYNECOLOGY

## 2020-02-26 PROCEDURE — 99213 OFFICE O/P EST LOW 20 MIN: CPT | Performed by: OBSTETRICS & GYNECOLOGY

## 2020-02-26 PROCEDURE — 87624 HPV HI-RISK TYP POOLED RSLT: CPT | Performed by: OBSTETRICS & GYNECOLOGY

## 2020-02-26 NOTE — PROGRESS NOTES
Dorina Stockton is a 25 y.o. y/o female.     Chief Complaint: Pap smear    HPI:   25 y.o. .  Patient's last menstrual period was 2020..  Patient presents for cervical Pap smear prior to surgery.  Pap smear was not done at previous visit as patient was bleeding heavily.  Patient has no questions or concerns about Pap smear today.     Review of Systems   Constitutional: Negative for chills, fatigue and fever.   HENT: Negative for sore throat.    Eyes: Negative for visual disturbance.   Respiratory: Negative for cough, shortness of breath and wheezing.    Cardiovascular: Negative for chest pain, palpitations and leg swelling.   Gastrointestinal: Negative for abdominal pain, diarrhea, nausea and vomiting.   Genitourinary: Positive for dyspareunia, menstrual problem, pelvic pain and vaginal bleeding. Negative for dysuria, flank pain, frequency, vaginal discharge and vaginal pain.   Neurological: Negative for syncope, light-headedness and headaches.   Psychiatric/Behavioral: Negative for dysphoric mood and suicidal ideas. The patient is not nervous/anxious.         The following portions of the patient's history were reviewed and updated as appropriate: allergies, current medications, past family history, past medical history, past social history, past surgical history and problem list.    Allergies   Allergen Reactions   • Strawberry Hives   • Sulfa Antibiotics Hives        Prior to Admission medications    Medication Sig Start Date End Date Taking? Authorizing Provider   ibuprofen (ADVIL,MOTRIN) 600 MG tablet Take 1 tablet by mouth Every 6 (Six) Hours As Needed for Mild Pain . 17   Dominik Toribio MD        The patient has a family history of   Family History   Problem Relation Age of Onset   • Other Father         carbon monoxide poisoning   • Cancer Other    • Diabetes Other    • Hypertension Other    • Stroke Other    • Cataracts Maternal Grandmother    • Cataracts Maternal Grandfather     • Cataracts Paternal Grandmother    • Cataracts Paternal Grandfather         Past Medical History:   Diagnosis Date   • Abdominal pain    • Acute bronchitis    • Bipolar 1 disorder (CMS/HCC)    • Corneal abrasion    • Cyst of left ovary    • Depression    • Dietary counseling and surveillance    • Encounter for  visit    • General counseling and advice on female contraception     would like a gary   • Gestational diabetes mellitus     only during pregnancy   • Hip dysplasia, congenital     bilateral   • Manic depression (CMS/HCC)    • Multiple personalities (CMS/HCC)    • Nausea    • Pregnancy test positive     serum   • Tension type headache    • Viral gastroenteritis         OB History        3    Para   2    Term   2       0    AB   1    Living   2       SAB   1    TAB   0    Ectopic   0    Molar   0    Multiple   0    Live Births   2                 Social History     Socioeconomic History   • Marital status:      Spouse name: Not on file   • Number of children: Not on file   • Years of education: Not on file   • Highest education level: Not on file   Tobacco Use   • Smoking status: Current Every Day Smoker     Packs/day: 0.25     Years: 12.00     Pack years: 3.00     Types: Cigarettes   • Smokeless tobacco: Never Used   Substance and Sexual Activity   • Alcohol use: No   • Drug use: No   • Sexual activity: Defer     Partners: Male     Birth control/protection: None        Past Surgical History:   Procedure Laterality Date   •  SECTION     • EAR TUBES     • HIP SURGERY Left    • [place] HYSTEROSCOPY,W/ENDOMETRIAL ABLATION N/A 2017    Procedure: HYSTEROSCOPY WITH ENDOMETRIAL ABLATION;  Surgeon: Dominik Toribio MD;  Location: [facility];  Service: Obstetrics/Gynecology   • TONSILLECTOMY      T & A   • TUBAL COAGULATION LAPAROSCOPIC Bilateral 2017    Procedure: EXAMINATION UNDER ANESTHESA, LAPAROSCOPIC TUBAL FULGURATION, DIAGNOISTIC HYSTEROSCOPY WITH  "FRACTIONAL DILATION AND CURETTAGE ENDOMETRAL ABLATION ;  Surgeon: Dominik Toribio MD;  Location: [facility];  Service:         Patient Active Problem List   Diagnosis   • Menorrhagia with regular cycle   • Unwanted fertility   • Abnormal uterine bleeding (AUB)   • Pelvic pain   • Female dyspareunia   • Left ovarian cyst   • History of endometrial ablation        Documented Vitals    02/26/20 1035   BP: 118/72   Weight: 76.7 kg (169 lb)   Height: 170.2 cm (67\")   PainSc: 0-No pain        Body mass index is 26.47 kg/m².    Physical Exam   Constitutional: She is oriented to person, place, and time. She appears well-developed and well-nourished. No distress.   HENT:   Head: Normocephalic.   Genitourinary: Vagina normal. No vaginal discharge found.   Genitourinary Comments: Pap smear obtained without difficulty   Musculoskeletal: Normal range of motion.   Neurological: She is alert and oriented to person, place, and time.   Skin: Skin is warm and dry. She is not diaphoretic.   Psychiatric: She has a normal mood and affect. Her behavior is normal. Judgment and thought content normal.   Vitals reviewed.      Laboratory Data:   Lab Results - Last 18 Months   Lab Units 02/25/20  1353   GLUCOSE mg/dL 107*   BUN mg/dL 11   CREATININE mg/dL 0.73   SODIUM mmol/L 141   POTASSIUM mmol/L 4.2   CHLORIDE mmol/L 104   CO2 mmol/L 23.0   CALCIUM mg/dL 9.5   EGFR IF NONAFRICN AM mL/min/1.73 96   BUN / CREAT RATIO  15.1   ANION GAP mmol/L 14.0     Lab Results - Last 18 Months   Lab Units 02/25/20  1353   WBC 10*3/mm3 9.27   RBC 10*6/mm3 5.07   HEMOGLOBIN g/dL 13.8   HEMATOCRIT % 43.0   MCV fL 84.8   MCH pg 27.2   MCHC g/dL 32.1   RDW % 13.2   RDW-SD fl 40.4   MPV fL 10.8   PLATELETS 10*3/mm3 248     No results for input(s): HCGQUAL in the last 91582 hours.    Assessment   Uncomplicated Pap smear patient undergo hysterectomy on 2 March.     Diagnosis Plan   1. Encounter for cervical Pap smear with pelvic exam  Liquid-based Pap Smear, " Screening   2. History of endometrial ablation     3. Left ovarian cyst     4. Female dyspareunia     5. Pelvic pain     6. Abnormal uterine bleeding (AUB)     7. Menorrhagia with regular cycle             This document has been electronically signed by Ricardo Orellana DO on February 26, 2020 12:16 PM

## 2020-02-27 LAB
GEN CATEG CVX/VAG CYTO-IMP: NORMAL
LAB AP CASE REPORT: NORMAL
LAB AP GYN ADDITIONAL INFORMATION: NORMAL
LAB AP GYN OTHER FINDINGS: NORMAL
PATH INTERP SPEC-IMP: NORMAL
STAT OF ADQ CVX/VAG CYTO-IMP: NORMAL

## 2020-02-28 ENCOUNTER — ANESTHESIA EVENT (OUTPATIENT)
Dept: PERIOP | Facility: HOSPITAL | Age: 25
End: 2020-02-28

## 2020-02-28 LAB — HPV I/H RISK 4 DNA CVX QL PROBE+SIG AMP: NEGATIVE

## 2020-03-02 ENCOUNTER — HOSPITAL ENCOUNTER (OUTPATIENT)
Facility: HOSPITAL | Age: 25
Discharge: HOME | End: 2020-03-02
Attending: OBSTETRICS & GYNECOLOGY | Admitting: OBSTETRICS & GYNECOLOGY

## 2020-03-02 ENCOUNTER — ANESTHESIA (OUTPATIENT)
Dept: PERIOP | Facility: HOSPITAL | Age: 25
End: 2020-03-02

## 2020-03-02 VITALS
OXYGEN SATURATION: 100 % | HEIGHT: 67 IN | TEMPERATURE: 97.9 F | RESPIRATION RATE: 20 BRPM | SYSTOLIC BLOOD PRESSURE: 116 MMHG | HEART RATE: 73 BPM | BODY MASS INDEX: 25.74 KG/M2 | WEIGHT: 164.02 LBS | DIASTOLIC BLOOD PRESSURE: 76 MMHG

## 2020-03-02 DIAGNOSIS — N92.0 MENORRHAGIA WITH REGULAR CYCLE: ICD-10-CM

## 2020-03-02 DIAGNOSIS — N83.202 LEFT OVARIAN CYST: ICD-10-CM

## 2020-03-02 DIAGNOSIS — N94.10 FEMALE DYSPAREUNIA: ICD-10-CM

## 2020-03-02 DIAGNOSIS — Z98.890 HISTORY OF ENDOMETRIAL ABLATION: ICD-10-CM

## 2020-03-02 DIAGNOSIS — R10.2 PELVIC PAIN: ICD-10-CM

## 2020-03-02 DIAGNOSIS — N93.9 ABNORMAL UTERINE BLEEDING (AUB): ICD-10-CM

## 2020-03-02 LAB
AMPHET+METHAMPHET UR QL: POSITIVE
AMPHETAMINES UR QL: POSITIVE
B-HCG UR QL: NEGATIVE
BARBITURATES UR QL SCN: NEGATIVE
BENZODIAZ UR QL SCN: NEGATIVE
BUPRENORPHINE SERPL-MCNC: NEGATIVE NG/ML
CANNABINOIDS SERPL QL: NEGATIVE
COCAINE UR QL: NEGATIVE
METHADONE UR QL SCN: NEGATIVE
OPIATES UR QL: NEGATIVE
OXYCODONE UR QL SCN: NEGATIVE
PCP UR QL SCN: NEGATIVE
PROPOXYPH UR QL: NEGATIVE
TRICYCLICS UR QL SCN: NEGATIVE

## 2020-03-02 PROCEDURE — 80306 DRUG TEST PRSMV INSTRMNT: CPT | Performed by: ANESTHESIOLOGY

## 2020-03-02 PROCEDURE — G0463 HOSPITAL OUTPT CLINIC VISIT: HCPCS | Performed by: OBSTETRICS & GYNECOLOGY

## 2020-03-02 PROCEDURE — 81025 URINE PREGNANCY TEST: CPT | Performed by: OBSTETRICS & GYNECOLOGY

## 2020-03-02 RX ORDER — BUPIVACAINE HCL/0.9 % NACL/PF 0.1 %
2 PLASTIC BAG, INJECTION (ML) EPIDURAL ONCE
Status: DISCONTINUED | OUTPATIENT
Start: 2020-03-02 | End: 2020-03-02 | Stop reason: HOSPADM

## 2020-03-02 RX ORDER — SODIUM CHLORIDE 0.9 % (FLUSH) 0.9 %
3 SYRINGE (ML) INJECTION EVERY 12 HOURS SCHEDULED
Status: DISCONTINUED | OUTPATIENT
Start: 2020-03-02 | End: 2020-03-02 | Stop reason: HOSPADM

## 2020-03-02 RX ORDER — SODIUM CHLORIDE 0.9 % (FLUSH) 0.9 %
10 SYRINGE (ML) INJECTION AS NEEDED
Status: DISCONTINUED | OUTPATIENT
Start: 2020-03-02 | End: 2020-03-02 | Stop reason: HOSPADM

## 2020-03-02 RX ORDER — SODIUM CHLORIDE, SODIUM LACTATE, POTASSIUM CHLORIDE, CALCIUM CHLORIDE 600; 310; 30; 20 MG/100ML; MG/100ML; MG/100ML; MG/100ML
125 INJECTION, SOLUTION INTRAVENOUS CONTINUOUS
Status: DISCONTINUED | OUTPATIENT
Start: 2020-03-02 | End: 2020-03-02 | Stop reason: HOSPADM

## 2020-03-02 RX ADMIN — SODIUM CHLORIDE, POTASSIUM CHLORIDE, SODIUM LACTATE AND CALCIUM CHLORIDE 125 ML/HR: 600; 310; 30; 20 INJECTION, SOLUTION INTRAVENOUS at 08:55

## 2020-03-02 NOTE — NURSING NOTE
Pt with positive UDS for methamphetamines, Dr. Branch spoke with Dr. Orellana and surgery will not be performed today.  Dr. Branch spoke with patient and patient understands reasoning for postponing.  Instructed to call Dr. Orellana office to reschedule.  IV d/c'd and patient discharged home.

## 2020-04-09 ENCOUNTER — TELEPHONE (OUTPATIENT)
Dept: OBSTETRICS AND GYNECOLOGY | Facility: CLINIC | Age: 25
End: 2020-04-09

## 2020-04-09 NOTE — TELEPHONE ENCOUNTER
Spoke with patient. She states she would like to get her surgery rescheduled when we start surgeries again.

## 2020-09-30 ENCOUNTER — HOSPITAL ENCOUNTER (EMERGENCY)
Facility: HOSPITAL | Age: 25
Discharge: HOME | End: 2020-10-01
Attending: EMERGENCY MEDICINE | Admitting: EMERGENCY MEDICINE

## 2020-09-30 DIAGNOSIS — R10.32 LLQ ABDOMINAL PAIN: Primary | ICD-10-CM

## 2020-09-30 LAB
AMPHET+METHAMPHET UR QL: NEGATIVE
AMPHETAMINES UR QL: NEGATIVE
BACTERIA UR QL AUTO: ABNORMAL /HPF
BARBITURATES UR QL SCN: NEGATIVE
BASOPHILS # BLD AUTO: 0.05 10*3/MM3 (ref 0–0.2)
BASOPHILS NFR BLD AUTO: 0.6 % (ref 0–1.5)
BENZODIAZ UR QL SCN: NEGATIVE
BILIRUB UR QL STRIP: NEGATIVE
BUPRENORPHINE SERPL-MCNC: NEGATIVE NG/ML
CANNABINOIDS SERPL QL: NEGATIVE
CLARITY UR: ABNORMAL
COCAINE UR QL: NEGATIVE
COLOR UR: YELLOW
DEPRECATED RDW RBC AUTO: 40 FL (ref 37–54)
EOSINOPHIL # BLD AUTO: 0.33 10*3/MM3 (ref 0–0.4)
EOSINOPHIL NFR BLD AUTO: 4.1 % (ref 0.3–6.2)
ERYTHROCYTE [DISTWIDTH] IN BLOOD BY AUTOMATED COUNT: 13.5 % (ref 12.3–15.4)
GLUCOSE UR STRIP-MCNC: NEGATIVE MG/DL
HCG SERPL QL: NEGATIVE
HCT VFR BLD AUTO: 39.3 % (ref 34–46.6)
HGB BLD-MCNC: 13.2 G/DL (ref 12–15.9)
HGB UR QL STRIP.AUTO: NEGATIVE
HYALINE CASTS UR QL AUTO: ABNORMAL /LPF
IMM GRANULOCYTES # BLD AUTO: 0.02 10*3/MM3 (ref 0–0.05)
IMM GRANULOCYTES NFR BLD AUTO: 0.2 % (ref 0–0.5)
KETONES UR QL STRIP: NEGATIVE
LEUKOCYTE ESTERASE UR QL STRIP.AUTO: ABNORMAL
LYMPHOCYTES # BLD AUTO: 2.79 10*3/MM3 (ref 0.7–3.1)
LYMPHOCYTES NFR BLD AUTO: 34.5 % (ref 19.6–45.3)
MCH RBC QN AUTO: 27.5 PG (ref 26.6–33)
MCHC RBC AUTO-ENTMCNC: 33.6 G/DL (ref 31.5–35.7)
MCV RBC AUTO: 81.9 FL (ref 79–97)
METHADONE UR QL SCN: NEGATIVE
MONOCYTES # BLD AUTO: 0.56 10*3/MM3 (ref 0.1–0.9)
MONOCYTES NFR BLD AUTO: 6.9 % (ref 5–12)
NEUTROPHILS NFR BLD AUTO: 4.34 10*3/MM3 (ref 1.7–7)
NEUTROPHILS NFR BLD AUTO: 53.7 % (ref 42.7–76)
NITRITE UR QL STRIP: NEGATIVE
NRBC BLD AUTO-RTO: 0 /100 WBC (ref 0–0.2)
OPIATES UR QL: NEGATIVE
OXYCODONE UR QL SCN: NEGATIVE
PCP UR QL SCN: NEGATIVE
PH UR STRIP.AUTO: 6.5 [PH] (ref 5–9)
PLATELET # BLD AUTO: 222 10*3/MM3 (ref 140–450)
PMV BLD AUTO: 10.7 FL (ref 6–12)
PROPOXYPH UR QL: NEGATIVE
PROT UR QL STRIP: NEGATIVE
RBC # BLD AUTO: 4.8 10*6/MM3 (ref 3.77–5.28)
RBC # UR: ABNORMAL /HPF
REF LAB TEST METHOD: ABNORMAL
SP GR UR STRIP: 1.02 (ref 1–1.03)
SQUAMOUS #/AREA URNS HPF: ABNORMAL /HPF
TRICYCLICS UR QL SCN: NEGATIVE
UROBILINOGEN UR QL STRIP: ABNORMAL
WBC # BLD AUTO: 8.09 10*3/MM3 (ref 3.4–10.8)
WBC UR QL AUTO: ABNORMAL /HPF

## 2020-09-30 PROCEDURE — 80306 DRUG TEST PRSMV INSTRMNT: CPT | Performed by: EMERGENCY MEDICINE

## 2020-09-30 PROCEDURE — 84703 CHORIONIC GONADOTROPIN ASSAY: CPT | Performed by: EMERGENCY MEDICINE

## 2020-09-30 PROCEDURE — 80053 COMPREHEN METABOLIC PANEL: CPT | Performed by: EMERGENCY MEDICINE

## 2020-09-30 PROCEDURE — 81001 URINALYSIS AUTO W/SCOPE: CPT | Performed by: EMERGENCY MEDICINE

## 2020-09-30 PROCEDURE — 96374 THER/PROPH/DIAG INJ IV PUSH: CPT

## 2020-09-30 PROCEDURE — 25010000002 KETOROLAC TROMETHAMINE PER 15 MG: Performed by: EMERGENCY MEDICINE

## 2020-09-30 PROCEDURE — 96375 TX/PRO/DX INJ NEW DRUG ADDON: CPT

## 2020-09-30 PROCEDURE — 85025 COMPLETE CBC W/AUTO DIFF WBC: CPT | Performed by: EMERGENCY MEDICINE

## 2020-09-30 PROCEDURE — 25010000002 ONDANSETRON PER 1 MG: Performed by: EMERGENCY MEDICINE

## 2020-09-30 PROCEDURE — 99283 EMERGENCY DEPT VISIT LOW MDM: CPT

## 2020-09-30 RX ORDER — SODIUM CHLORIDE 0.9 % (FLUSH) 0.9 %
10 SYRINGE (ML) INJECTION AS NEEDED
Status: DISCONTINUED | OUTPATIENT
Start: 2020-09-30 | End: 2020-10-01 | Stop reason: HOSPADM

## 2020-09-30 RX ORDER — ONDANSETRON 2 MG/ML
4 INJECTION INTRAMUSCULAR; INTRAVENOUS ONCE
Status: COMPLETED | OUTPATIENT
Start: 2020-09-30 | End: 2020-09-30

## 2020-09-30 RX ORDER — KETOROLAC TROMETHAMINE 30 MG/ML
30 INJECTION, SOLUTION INTRAMUSCULAR; INTRAVENOUS ONCE
Status: COMPLETED | OUTPATIENT
Start: 2020-09-30 | End: 2020-09-30

## 2020-09-30 RX ADMIN — KETOROLAC TROMETHAMINE 30 MG: 30 INJECTION, SOLUTION INTRAMUSCULAR; INTRAVENOUS at 23:40

## 2020-09-30 RX ADMIN — SODIUM CHLORIDE 1000 ML: 9 INJECTION, SOLUTION INTRAVENOUS at 23:38

## 2020-09-30 RX ADMIN — ONDANSETRON HYDROCHLORIDE 4 MG: 2 INJECTION, SOLUTION INTRAMUSCULAR; INTRAVENOUS at 23:39

## 2020-10-01 ENCOUNTER — APPOINTMENT (OUTPATIENT)
Dept: ULTRASOUND IMAGING | Facility: HOSPITAL | Age: 25
End: 2020-10-01

## 2020-10-01 VITALS
OXYGEN SATURATION: 99 % | RESPIRATION RATE: 18 BRPM | DIASTOLIC BLOOD PRESSURE: 74 MMHG | SYSTOLIC BLOOD PRESSURE: 132 MMHG | HEART RATE: 62 BPM

## 2020-10-01 LAB
ALBUMIN SERPL-MCNC: 4.1 G/DL (ref 3.5–5.2)
ALBUMIN/GLOB SERPL: 1.5 G/DL
ALP SERPL-CCNC: 70 U/L (ref 39–117)
ALT SERPL W P-5'-P-CCNC: 15 U/L (ref 1–33)
ANION GAP SERPL CALCULATED.3IONS-SCNC: 13 MMOL/L (ref 5–15)
AST SERPL-CCNC: 20 U/L (ref 1–32)
BILIRUB SERPL-MCNC: 0.2 MG/DL (ref 0–1.2)
BUN SERPL-MCNC: 12 MG/DL (ref 6–20)
BUN/CREAT SERPL: 15 (ref 7–25)
CALCIUM SPEC-SCNC: 9.3 MG/DL (ref 8.6–10.5)
CHLORIDE SERPL-SCNC: 104 MMOL/L (ref 98–107)
CO2 SERPL-SCNC: 21 MMOL/L (ref 22–29)
CREAT SERPL-MCNC: 0.8 MG/DL (ref 0.57–1)
GFR SERPL CREATININE-BSD FRML MDRD: 87 ML/MIN/1.73
GLOBULIN UR ELPH-MCNC: 2.7 GM/DL
GLUCOSE SERPL-MCNC: 108 MG/DL (ref 65–99)
POTASSIUM SERPL-SCNC: 4.2 MMOL/L (ref 3.5–5.2)
PROT SERPL-MCNC: 6.8 G/DL (ref 6–8.5)
SODIUM SERPL-SCNC: 138 MMOL/L (ref 136–145)
WHOLE BLOOD HOLD SPECIMEN: NORMAL

## 2020-10-01 PROCEDURE — 76830 TRANSVAGINAL US NON-OB: CPT

## 2020-10-01 PROCEDURE — 25010000002 ONDANSETRON PER 1 MG: Performed by: EMERGENCY MEDICINE

## 2020-10-01 PROCEDURE — 93976 VASCULAR STUDY: CPT

## 2020-10-01 PROCEDURE — 96376 TX/PRO/DX INJ SAME DRUG ADON: CPT

## 2020-10-01 PROCEDURE — 25010000002 MORPHINE PER 10 MG: Performed by: EMERGENCY MEDICINE

## 2020-10-01 PROCEDURE — 96375 TX/PRO/DX INJ NEW DRUG ADDON: CPT

## 2020-10-01 RX ORDER — ONDANSETRON 2 MG/ML
4 INJECTION INTRAMUSCULAR; INTRAVENOUS ONCE
Status: COMPLETED | OUTPATIENT
Start: 2020-10-01 | End: 2020-10-01

## 2020-10-01 RX ADMIN — MORPHINE SULFATE 4 MG: 4 INJECTION, SOLUTION INTRAMUSCULAR; INTRAVENOUS at 01:27

## 2020-10-01 RX ADMIN — ONDANSETRON HYDROCHLORIDE 4 MG: 2 INJECTION, SOLUTION INTRAMUSCULAR; INTRAVENOUS at 01:26

## 2020-10-01 NOTE — ED PROVIDER NOTES
Subjective   25-year-old white female with a history of left ovarian cyst presents to the emergency department with chief complaint of abdominal pain.  Patient complains of left lower quadrant pain that started at 3:00 this afternoon.  She rates the pain as 9 out of 10 severity.  She relates nothing makes the pain better and nothing makes the pain worse.  Associated symptoms include nausea.  Patient denies fever, sweats, chills, vomiting, dysuria, hematuria, vaginal bleeding, or vaginal discharge.          Review of Systems   Constitutional: Negative for chills, diaphoresis and fever.   Respiratory: Negative for cough and shortness of breath.    Cardiovascular: Negative for chest pain.   Gastrointestinal: Positive for abdominal pain and nausea. Negative for blood in stool and vomiting.   Genitourinary: Negative for dysuria, hematuria, vaginal bleeding and vaginal discharge.   Musculoskeletal: Negative for back pain, gait problem and neck pain.   Neurological: Negative for syncope, weakness and headaches.   All other systems reviewed and are negative.      Past Medical History:   Diagnosis Date   • Abdominal pain    • Acute bronchitis    • Bipolar 1 disorder (CMS/HCC)    • Corneal abrasion    • Cyst of left ovary    • Depression    • Dietary counseling and surveillance    • Encounter for  visit    • General counseling and advice on female contraception     would like a gary   • Gestational diabetes mellitus     only during pregnancy   • Hip dysplasia, congenital     bilateral   • Manic depression (CMS/HCC)    • Multiple personalities (CMS/HCC)    • Nausea    • Pregnancy test positive     serum   • Tension type headache    • Viral gastroenteritis        Allergies   Allergen Reactions   • Strawberry Hives   • Sulfa Antibiotics Hives       Past Surgical History:   Procedure Laterality Date   •  SECTION     • EAR TUBES     • HIP SURGERY Left    • [place] HYSTEROSCOPY,W/ENDOMETRIAL ABLATION N/A  12/20/2017    Procedure: HYSTEROSCOPY WITH ENDOMETRIAL ABLATION;  Surgeon: Dominik Toribio MD;  Location: [facility];  Service: Obstetrics/Gynecology   • TONSILLECTOMY      T & A   • TUBAL COAGULATION LAPAROSCOPIC Bilateral 12/20/2017    Procedure: EXAMINATION UNDER ANESTHESA, LAPAROSCOPIC TUBAL FULGURATION, DIAGNOISTIC HYSTEROSCOPY WITH FRACTIONAL DILATION AND CURETTAGE ENDOMETRAL ABLATION ;  Surgeon: Dominik Toribio MD;  Location: [facility];  Service:        Family History   Problem Relation Age of Onset   • Other Father         carbon monoxide poisoning   • Cancer Other    • Diabetes Other    • Hypertension Other    • Stroke Other    • Cataracts Maternal Grandmother    • Cataracts Maternal Grandfather    • Cataracts Paternal Grandmother    • Cataracts Paternal Grandfather        Social History     Socioeconomic History   • Marital status:      Spouse name: Not on file   • Number of children: Not on file   • Years of education: Not on file   • Highest education level: Not on file   Tobacco Use   • Smoking status: Current Every Day Smoker     Packs/day: 0.25     Years: 12.00     Pack years: 3.00     Types: Cigarettes   • Smokeless tobacco: Never Used   Substance and Sexual Activity   • Alcohol use: No   • Drug use: No   • Sexual activity: Defer     Partners: Male     Birth control/protection: None           Objective   Physical Exam  Vitals signs and nursing note reviewed.   Constitutional:       General: She is not in acute distress.     Appearance: She is not toxic-appearing or diaphoretic.   HENT:      Head: Normocephalic and atraumatic.      Right Ear: External ear normal.      Left Ear: External ear normal.      Nose: Nose normal.      Mouth/Throat:      Mouth: Mucous membranes are moist.      Pharynx: Oropharynx is clear.   Eyes:      Extraocular Movements: Extraocular movements intact.      Conjunctiva/sclera: Conjunctivae normal.      Pupils: Pupils are equal, round, and reactive to light.    Neck:      Musculoskeletal: Normal range of motion and neck supple.   Cardiovascular:      Rate and Rhythm: Normal rate and regular rhythm.      Pulses: Normal pulses.      Heart sounds: Normal heart sounds.   Pulmonary:      Effort: Pulmonary effort is normal.      Breath sounds: Normal breath sounds.   Abdominal:      General: Bowel sounds are normal. There is no distension.      Palpations: Abdomen is soft. There is no mass.      Tenderness: There is abdominal tenderness in the left lower quadrant. There is no guarding or rebound.   Musculoskeletal:         General: No swelling.   Skin:     General: Skin is warm and dry.   Neurological:      Mental Status: She is alert and oriented to person, place, and time.      Motor: No weakness.   Psychiatric:         Mood and Affect: Mood normal.         Behavior: Behavior normal.         Procedures           ED Course  ED Course as of Oct 01 0137   Thu Oct 01, 2020   0134 Patient is alert and resting comfortably. I reviewed the results of the emergency department evaluation with the patient.  I recommended primary care follow-up.  I advised the patient to return to the emergency department if their symptoms change or worsen.     [DR]      ED Course User Index  [DR] John Quinonez MD      Labs Reviewed   COMPREHENSIVE METABOLIC PANEL - Abnormal; Notable for the following components:       Result Value    Glucose 108 (*)     CO2 21.0 (*)     All other components within normal limits    Narrative:     GFR Normal >60  Chronic Kidney Disease <60  Kidney Failure <15     URINALYSIS W/ MICROSCOPIC IF INDICATED (NO CULTURE) - Abnormal; Notable for the following components:    Appearance, UA Cloudy (*)     Leuk Esterase, UA Trace (*)     All other components within normal limits   URINALYSIS, MICROSCOPIC ONLY - Abnormal; Notable for the following components:    RBC, UA 0-2 (*)     Bacteria, UA 1+ (*)     Squamous Epithelial Cells, UA 3-5 (*)     All other components within normal  limits   HCG, SERUM, QUALITATIVE - Normal   URINE DRUG SCREEN - Normal    Narrative:     Cutoff For Drugs Screened:    Amphetamines               500 ng/ml  Barbiturates               200 ng/ml  Benzodiazepines            150 ng/ml  Cocaine                    150 ng/ml  Methadone                  200 ng/ml  Opiates                    100 ng/ml  Phencyclidine               25 ng/ml  THC                            50 ng/ml  Methamphetamine            500 ng/ml  Tricyclic Antidepressants  300 ng/ml  Oxycodone                  100 ng/ml  Propoxyphene               300 ng/ml  Buprenorphine               10 ng/ml    The normal value for all drugs tested is negative. This report includes unconfirmed screening results, with the cutoff values listed, to be used for medical treatment purposes only.  Unconfirmed results must not be used for non-medical purposes such as employment or legal testing.  Clinical consideration should be applied to any drug of abuse test, particularly when unconfirmed results are used.     CBC WITH AUTO DIFFERENTIAL - Normal   CBC AND DIFFERENTIAL    Narrative:     The following orders were created for panel order CBC & Differential.  Procedure                               Abnormality         Status                     ---------                               -----------         ------                     CBC Auto Differential[[id number]]        Normal              Final result                 Please view results for these tests on the individual orders.   EXTRA TUBES    Narrative:     The following orders were created for panel order Extra Tubes.  Procedure                               Abnormality         Status                     ---------                               -----------         ------                     Light Blue Top[[id number]]                                   Final result                 Please view results for these tests on the individual orders.   LIGHT BLUE TOP     Us Non-ob  Transvaginal    Result Date: 10/1/2020  Narrative: EXAM:  US Pelvis Transabdominal and Transvaginal, Complete and US Duplex Arterial/Venous of the Pelvis, Complete CLINICAL HISTORY:  25 years old Female; LLQ pain. TECHNIQUE:  Real-time complete transabdominal and transvaginal pelvic ultrasound with image documentation.  Transvaginal imaging was used for better evaluation of the endometrium and adnexa. Real-time duplex ultrasound scan of the arterial and venous flow of the pelvis with color Doppler flow and spectral waveform analysis. COMPARISON:  No relevant prior studies available. FINDINGS: LIMITATIONS:  Study provided limited by overlying bowel gas. UTERUS/CERVIX:  Uterus anteverted and measures 7.9 x 4.4 x 5.8 cm, no focal uterine abnormality seen. Some heterogeneous echogenicity identified in the uterus which may relate to fibroid change, however.  Endometrial echo complex normal at 8 mm in diameter and normal in echogenicity. RIGHT OVARY:  Right ovary not visualized. No right adnexal mass seen. LEFT OVARY:  Left ovary measures 2.7 x 1.5 x 1.3 cm and contains prominent follicles. Flow demonstrated in this ovary by color Doppler evaluation as per the scanning technologist. No left adnexal mass seen. FREE FLUID:  No free fluid seen in the dependent pelvis. BLADDER:  Incompletely distended at the time of scanning and not evaluated.     Impression: - No evidence of left ovarian torsion. Right ovary not visualized and right ovarian torsion cannot be ruled out on the basis of this examination. - Possible fibroid uterus. - Study provided limited by overlying bowel gas. Thank you for allowing us to participate in the care of this patient. Electronically signed by:  Bernardo Bautista MD  10/1/2020 1:32 AM CDT Workstation: [id number]    Us Testicular Or Ovarian Vascular Limited    Result Date: 10/1/2020  Narrative: EXAM:  US Pelvis Transabdominal and Transvaginal, Complete and US Duplex Arterial/Venous of the Pelvis, Complete  CLINICAL HISTORY:  25 years old Female; LLQ pain. TECHNIQUE:  Real-time complete transabdominal and transvaginal pelvic ultrasound with image documentation.  Transvaginal imaging was used for better evaluation of the endometrium and adnexa. Real-time duplex ultrasound scan of the arterial and venous flow of the pelvis with color Doppler flow and spectral waveform analysis. COMPARISON:  No relevant prior studies available. FINDINGS: LIMITATIONS:  Study provided limited by overlying bowel gas. UTERUS/CERVIX:  Uterus anteverted and measures 7.9 x 4.4 x 5.8 cm, no focal uterine abnormality seen. Some heterogeneous echogenicity identified in the uterus which may relate to fibroid change, however.  Endometrial echo complex normal at 8 mm in diameter and normal in echogenicity. RIGHT OVARY:  Right ovary not visualized. No right adnexal mass seen. LEFT OVARY:  Left ovary measures 2.7 x 1.5 x 1.3 cm and contains prominent follicles. Flow demonstrated in this ovary by color Doppler evaluation as per the scanning technologist. No left adnexal mass seen. FREE FLUID:  No free fluid seen in the dependent pelvis. BLADDER:  Incompletely distended at the time of scanning and not evaluated.     Impression: - No evidence of left ovarian torsion. Right ovary not visualized and right ovarian torsion cannot be ruled out on the basis of this examination. - Possible fibroid uterus. - Study provided limited by overlying bowel gas. Thank you for allowing us to participate in the care of this patient. Electronically signed by:  Bernardo Bautista MD  10/1/2020 1:32 AM CDT Workstation: [id number]                                       [facility]    Final diagnoses:   LLQ abdominal pain            John Quinonez MD  10/01/20 0137

## 2020-10-07 ENCOUNTER — OFFICE VISIT (OUTPATIENT)
Dept: OBSTETRICS AND GYNECOLOGY | Facility: CLINIC | Age: 25
End: 2020-10-07

## 2020-10-07 VITALS
HEIGHT: 67 IN | SYSTOLIC BLOOD PRESSURE: 105 MMHG | BODY MASS INDEX: 28.72 KG/M2 | DIASTOLIC BLOOD PRESSURE: 77 MMHG | WEIGHT: 183 LBS

## 2020-10-07 DIAGNOSIS — N92.0 MENORRHAGIA WITH REGULAR CYCLE: ICD-10-CM

## 2020-10-07 DIAGNOSIS — Z98.890 HISTORY OF ENDOMETRIAL ABLATION: Primary | ICD-10-CM

## 2020-10-07 DIAGNOSIS — N94.10 FEMALE DYSPAREUNIA: ICD-10-CM

## 2020-10-07 DIAGNOSIS — N93.9 ABNORMAL UTERINE BLEEDING (AUB): ICD-10-CM

## 2020-10-07 DIAGNOSIS — R10.2 PELVIC PAIN: ICD-10-CM

## 2020-10-07 PROCEDURE — 99213 OFFICE O/P EST LOW 20 MIN: CPT | Performed by: OBSTETRICS & GYNECOLOGY

## 2020-10-07 RX ORDER — SODIUM CHLORIDE 0.9 % (FLUSH) 0.9 %
3 SYRINGE (ML) INJECTION EVERY 12 HOURS SCHEDULED
Status: CANCELLED | OUTPATIENT
Start: 2020-11-30

## 2020-10-07 RX ORDER — SODIUM CHLORIDE 0.9 % (FLUSH) 0.9 %
10 SYRINGE (ML) INJECTION AS NEEDED
Status: CANCELLED | OUTPATIENT
Start: 2020-11-30

## 2020-10-07 RX ORDER — SODIUM CHLORIDE, SODIUM LACTATE, POTASSIUM CHLORIDE, CALCIUM CHLORIDE 600; 310; 30; 20 MG/100ML; MG/100ML; MG/100ML; MG/100ML
125 INJECTION, SOLUTION INTRAVENOUS CONTINUOUS
Status: CANCELLED | OUTPATIENT
Start: 2020-11-30

## 2020-10-07 RX ORDER — BUPIVACAINE HCL/0.9 % NACL/PF 0.1 %
2 PLASTIC BAG, INJECTION (ML) EPIDURAL ONCE
Status: CANCELLED | OUTPATIENT
Start: 2020-11-30 | End: 2020-10-07

## 2020-10-07 NOTE — PROGRESS NOTES
Dorina Stockton is a 25 y.o. y/o female.     Chief Complaint: Pelvic pain, vaginal bleeding and dyspareunia    HPI:   25 y.o. .  Patient's last menstrual period was 10/05/2020..  Patient presents for follow-up on pelvic pain abnormal uterine bleeding and dyspareunia.  Patient had been scheduled previously for hysterectomy but given COVID changes surgery had to be canceled.  Patient is now returning requesting surgery.  Symptoms have not changed she continues to have severe pelvic pain bleeding and pain with intercourse.  Patient has had ablation in the past and despite this bleeding continues.  I feel that hysterectomy is a good option for this patient.  We will plan for total laparoscopic hysterectomy with bilateral salpingectomy and cystoscopy on .    Note from last visit: Patient presents for follow-up on pelvic pain today.  Patient had started on oral contraceptive pills at last visit, and is not getting any relief from the pain.  Patient had ultrasound today which was overall normal except for a 4 cm cyst that was noted on the left ovary.  Patient has had an ablation in the past and is still having heavy bleeding which is bothersome and seems to make her pain worse.  Given that the patient is not getting relief of her pelvic pain and bleeding with oral contraceptive pills and that she has failed endometrial ablation, my recommendation was for more definitive treatment via hysterectomy.  Plan is for total laparoscopic hysterectomy, however given her history of ablation placing the uterine manipulator may be difficult.  Endometrial lining was noted to be thickened on ultrasound.  If unable to place uterine manipulator patient is aware that hysterectomy may need to be an abdominal hysterectomy.  Patient felt comfortable with this plan.  Plan for patient to go undergo hysterectomy on  either laparoscopic or abdominal based on findings at the time of surgery.  No endometrial sampling is  warranted at this time as patient has had ablation.     Review of Systems   Constitutional: Negative for chills, fatigue and fever.   HENT: Negative for sore throat.    Eyes: Negative for visual disturbance.   Respiratory: Negative for cough, shortness of breath and wheezing.    Cardiovascular: Negative for chest pain, palpitations and leg swelling.   Gastrointestinal: Negative for abdominal pain, diarrhea, nausea and vomiting.   Genitourinary: Positive for menstrual problem, pelvic pain and vaginal bleeding. Negative for dysuria, flank pain, frequency, vaginal discharge and vaginal pain.   Neurological: Negative for syncope, light-headedness and headaches.   Psychiatric/Behavioral: Negative for dysphoric mood and suicidal ideas. The patient is not nervous/anxious.         The following portions of the patient's history were reviewed and updated as appropriate: allergies, current medications, past family history, past medical history, past social history, past surgical history and problem list.    Allergies   Allergen Reactions   • Strawberry Hives   • Sulfa Antibiotics Hives        Prior to Admission medications    Medication Sig Start Date End Date Taking? Authorizing Provider   ibuprofen (ADVIL,MOTRIN) 600 MG tablet Take 1 tablet by mouth Every 6 (Six) Hours As Needed for Mild Pain . 12/20/17   Dominik Toribio MD        The patient has a family history of   Family History   Problem Relation Age of Onset   • Other Father         carbon monoxide poisoning   • Cancer Other    • Diabetes Other    • Hypertension Other    • Stroke Other    • Cataracts Maternal Grandmother    • Cataracts Maternal Grandfather    • Cataracts Paternal Grandmother    • Cataracts Paternal Grandfather         Past Medical History:   Diagnosis Date   • Abdominal pain    • Acute bronchitis    • Bipolar 1 disorder (CMS/HCC)    • Corneal abrasion    • Cyst of left ovary    • Depression    • Dietary counseling and surveillance    • Encounter  for  visit    • General counseling and advice on female contraception     would like a gary   • Gestational diabetes mellitus     only during pregnancy   • Hip dysplasia, congenital     bilateral   • Manic depression (CMS/HCC)    • Multiple personalities (CMS/HCC)    • Nausea    • Pregnancy test positive     serum   • Tension type headache    • Viral gastroenteritis         OB History        3    Para   2    Term   2       0    AB   1    Living   2       SAB   1    TAB   0    Ectopic   0    Molar   0    Multiple   0    Live Births   2                 Social History     Socioeconomic History   • Marital status:      Spouse name: Not on file   • Number of children: Not on file   • Years of education: Not on file   • Highest education level: Not on file   Tobacco Use   • Smoking status: Current Every Day Smoker     Packs/day: 0.25     Years: 12.00     Pack years: 3.00     Types: Cigarettes   • Smokeless tobacco: Never Used   Substance and Sexual Activity   • Alcohol use: No   • Drug use: No   • Sexual activity: Defer     Partners: Male     Birth control/protection: None        Past Surgical History:   Procedure Laterality Date   •  SECTION     • EAR TUBES     • HIP SURGERY Left    • [place] HYSTEROSCOPY,W/ENDOMETRIAL ABLATION N/A 2017    Procedure: HYSTEROSCOPY WITH ENDOMETRIAL ABLATION;  Surgeon: Dominik Toribio MD;  Location: [facility];  Service: Obstetrics/Gynecology   • TONSILLECTOMY      T & A   • TUBAL COAGULATION LAPAROSCOPIC Bilateral 2017    Procedure: EXAMINATION UNDER ANESTHESA, LAPAROSCOPIC TUBAL FULGURATION, DIAGNOISTIC HYSTEROSCOPY WITH FRACTIONAL DILATION AND CURETTAGE ENDOMETRAL ABLATION ;  Surgeon: Dominik Toribio MD;  Location: [facility];  Service:         Patient Active Problem List   Diagnosis   • Menorrhagia with regular cycle   • Unwanted fertility   • Abnormal uterine bleeding (AUB)   • Pelvic pain   • Female dyspareunia   • Left ovarian  "cyst   • History of endometrial ablation        Documented Vitals    10/07/20 1322   BP: 105/77   Weight: 83 kg (183 lb)   Height: 170.2 cm (67\")   PainSc: 0-No pain        Body mass index is 28.66 kg/m².    Physical Exam  Vitals signs reviewed.   Constitutional:       General: She is not in acute distress.     Appearance: She is well-developed.   HENT:      Head: Normocephalic.   Neck:      Thyroid: No thyromegaly.   Cardiovascular:      Rate and Rhythm: Normal rate and regular rhythm.      Heart sounds: Normal heart sounds. No murmur.   Pulmonary:      Effort: Pulmonary effort is normal. No respiratory distress.      Breath sounds: Normal breath sounds. No wheezing.   Abdominal:      General: Bowel sounds are normal. There is no distension.      Palpations: Abdomen is soft. There is no mass.      Tenderness: There is no abdominal tenderness. There is no guarding or rebound.   Genitourinary:     Vagina: Normal. No vaginal discharge.   Musculoskeletal: Normal range of motion.         General: No tenderness or deformity.   Skin:     General: Skin is warm and dry.      Findings: No erythema.   Neurological:      Mental Status: She is alert and oriented to person, place, and time.   Psychiatric:         Behavior: Behavior normal.         Thought Content: Thought content normal.         Judgment: Judgment normal.         Laboratory Data:   Lab Results - Last 18 Months   Lab Units 09/30/20  2333 02/25/20  1353   GLUCOSE mg/dL 108* 107*   BUN mg/dL 12 11   CREATININE mg/dL 0.80 0.73   SODIUM mmol/L 138 141   POTASSIUM mmol/L 4.2 4.2   CHLORIDE mmol/L 104 104   CO2 mmol/L 21.0* 23.0   CALCIUM mg/dL 9.3 9.5   TOTAL PROTEIN g/dL 6.8  --    ALBUMIN g/dL 4.10  --    ALT (SGPT) U/L 15  --    AST (SGOT) U/L 20  --    ALK PHOS U/L 70  --    BILIRUBIN mg/dL 0.2  --    EGFR IF NONAFRICN AM mL/min/1.73 87 96   GLOBULIN gm/dL 2.7  --    A/G RATIO g/dL 1.5  --    BUN / CREAT RATIO  15.0 15.1   ANION GAP mmol/L 13.0 14.0     Lab " Results - Last 18 Months   Lab Units 09/30/20  2333 02/25/20  1353   WBC 10*3/mm3 8.09 9.27   RBC 10*6/mm3 4.80 5.07   HEMOGLOBIN g/dL 13.2 13.8   HEMATOCRIT % 39.3 43.0   MCV fL 81.9 84.8   MCH pg 27.5 27.2   MCHC g/dL 33.6 32.1   RDW % 13.5 13.2   RDW-SD fl 40.0 40.4   MPV fL 10.7 10.8   PLATELETS 10*3/mm3 222 248     Lab Results - Last 18 Months   Lab Units 09/30/20  2333   HCG QUALITATIVE  Negative       Assessment   Patient with pain bleeding and dyspareunia with history of endometrial ablation patient was previously scheduled for hysterectomy however surgery had to be canceled.  Will schedule surgery for 30 November.  Patient to return to see me approximately 4 weeks for preop, return sooner as needed.  Pap smear is up-to-date and no endometrial biopsy is needed given the history of ablation.     Diagnosis Plan   1. History of endometrial ablation     2. Female dyspareunia     3. Pelvic pain     4. Abnormal uterine bleeding (AUB)     5. Menorrhagia with regular cycle           This document has been electronically signed by Ricardo Orellana DO on October 7, 2020 13:43 CDT

## 2020-11-03 ENCOUNTER — OFFICE VISIT (OUTPATIENT)
Dept: OBSTETRICS AND GYNECOLOGY | Facility: CLINIC | Age: 25
End: 2020-11-03

## 2020-11-03 VITALS
WEIGHT: 187 LBS | SYSTOLIC BLOOD PRESSURE: 120 MMHG | HEIGHT: 67 IN | BODY MASS INDEX: 29.35 KG/M2 | DIASTOLIC BLOOD PRESSURE: 66 MMHG

## 2020-11-03 DIAGNOSIS — Z98.890 HISTORY OF ENDOMETRIAL ABLATION: ICD-10-CM

## 2020-11-03 DIAGNOSIS — N92.0 MENORRHAGIA WITH REGULAR CYCLE: Primary | ICD-10-CM

## 2020-11-03 DIAGNOSIS — R10.2 PELVIC PAIN: ICD-10-CM

## 2020-11-03 DIAGNOSIS — N93.9 ABNORMAL UTERINE BLEEDING (AUB): ICD-10-CM

## 2020-11-03 DIAGNOSIS — N83.202 LEFT OVARIAN CYST: ICD-10-CM

## 2020-11-03 DIAGNOSIS — N94.10 FEMALE DYSPAREUNIA: ICD-10-CM

## 2020-11-03 PROCEDURE — 99213 OFFICE O/P EST LOW 20 MIN: CPT | Performed by: OBSTETRICS & GYNECOLOGY

## 2020-11-03 NOTE — PROGRESS NOTES
Dorina Stockton is a 25 y.o. y/o female.     Chief Complaint: Pelvic pain and abnormal bleeding, preop    HPI:   25 y.o. .  Patient's last menstrual period was 10/05/2020..  Patient presents for preop prior to surgery.  Patient undergo total laparoscopic hysterectomy with left salpingo-oophorectomy and right salpingectomy with cystoscopy on .  Patient is still having bleeding and cramping and still desires to move forward with surgery.  No other concerns or complaints at this time.  Patient is up-to-date on her Pap smear and endometrial biopsy is not going to be done as patient has undergone ablation.    Patient requests laparoscopic hysterectomy with left salpingo-oophorectomy right salpingectomy and cystoscopy.. She understands the risk up to and including death. She understands the risk of serious urologic morbidity such as vesico vaginal fistula, damage to the ureter and or bladder with possible need for additional surgery and low possibility of nephrectomy and/or extended morbidity. She understands the risk of damage to bowel possible colostomy. She understands the risk of damage to bowel undetected at time of procedure with sepsis and/or need returned OR.  I discussed the risk of bleeding with the patient and possible risk of blood transfusion.  Blood products carry risk of HIV, infection, hepatitis, and transfusion reaction. Patient is willing to accept blood products. She understands the risk of delayed hemorrhage with possible need to return to the OR. She understands the risk of hematoma formation. She understands the risk of infection in the abdominal wall, pelvis, abdomen and other parts of the body. She understands the alternatives of medical therapy and the risks and alternatives. Her questions are answered at length. She understands that there is a  possibility that we may need to convert this to a total abdominal hysterectomy and she accepts this. Patient expressed  understanding and desires to proceed with surgery.     Notes from previous visits: Patient presents for follow-up on pelvic pain abnormal uterine bleeding and dyspareunia.  Patient had been scheduled previously for hysterectomy but given COVID changes surgery had to be canceled.  Patient is now returning requesting surgery.  Symptoms have not changed she continues to have severe pelvic pain bleeding and pain with intercourse.  Patient has had ablation in the past and despite this bleeding continues.  I feel that hysterectomy is a good option for this patient.  We will plan for total laparoscopic hysterectomy with bilateral salpingectomy and cystoscopy on 30 November.     Note from last visit: Patient presents for follow-up on pelvic pain today.  Patient had started on oral contraceptive pills at last visit, and is not getting any relief from the pain.  Patient had ultrasound today which was overall normal except for a 4 cm cyst that was noted on the left ovary.  Patient has had an ablation in the past and is still having heavy bleeding which is bothersome and seems to make her pain worse.  Given that the patient is not getting relief of her pelvic pain and bleeding with oral contraceptive pills and that she has failed endometrial ablation, my recommendation was for more definitive treatment via hysterectomy.  Plan is for total laparoscopic hysterectomy, however given her history of ablation placing the uterine manipulator may be difficult.  Endometrial lining was noted to be thickened on ultrasound.  If unable to place uterine manipulator patient is aware that hysterectomy may need to be an abdominal hysterectomy.  Patient felt comfortable with this plan.  Plan for patient to go undergo hysterectomy on 2 March either laparoscopic or abdominal based on findings at the time of surgery.  No endometrial sampling is warranted at this time as patient has had ablation.    Review of Systems   Constitutional: Negative for  chills, fatigue and fever.   HENT: Negative for sore throat.    Eyes: Negative for visual disturbance.   Respiratory: Negative for cough, shortness of breath and wheezing.    Cardiovascular: Negative for chest pain, palpitations and leg swelling.   Gastrointestinal: Negative for abdominal pain, diarrhea, nausea and vomiting.   Genitourinary: Positive for menstrual problem, pelvic pain and vaginal bleeding. Negative for dysuria, flank pain, frequency, vaginal discharge and vaginal pain.   Neurological: Negative for syncope, light-headedness and headaches.   Psychiatric/Behavioral: Negative for dysphoric mood and suicidal ideas. The patient is not nervous/anxious.         The following portions of the patient's history were reviewed and updated as appropriate: allergies, current medications, past family history, past medical history, past social history, past surgical history and problem list.    Allergies   Allergen Reactions   • Strawberry Hives   • Sulfa Antibiotics Hives        Prior to Admission medications    Medication Sig Start Date End Date Taking? Authorizing Provider   ibuprofen (ADVIL,MOTRIN) 600 MG tablet Take 1 tablet by mouth Every 6 (Six) Hours As Needed for Mild Pain . 17  Yes Dominik Toribio MD        The patient has a family history of   Family History   Problem Relation Age of Onset   • Other Father         carbon monoxide poisoning   • Cancer Other    • Diabetes Other    • Hypertension Other    • Stroke Other    • Cataracts Maternal Grandmother    • Cataracts Maternal Grandfather    • Cataracts Paternal Grandmother    • Cataracts Paternal Grandfather         Past Medical History:   Diagnosis Date   • Abdominal pain    • Acute bronchitis    • Bipolar 1 disorder (CMS/HCC)    • Corneal abrasion    • Cyst of left ovary    • Depression    • Dietary counseling and surveillance    • Encounter for  visit    • General counseling and advice on female contraception     would like a gary    • Gestational diabetes mellitus     only during pregnancy   • Hip dysplasia, congenital     bilateral   • Manic depression (CMS/HCC)    • Multiple personalities (CMS/HCC)    • Nausea    • Pregnancy test positive     serum   • Tension type headache    • Viral gastroenteritis         OB History        3    Para   2    Term   2       0    AB   1    Living   2       SAB   1    TAB   0    Ectopic   0    Molar   0    Multiple   0    Live Births   2                 Social History     Socioeconomic History   • Marital status:      Spouse name: Not on file   • Number of children: Not on file   • Years of education: Not on file   • Highest education level: Not on file   Tobacco Use   • Smoking status: Current Every Day Smoker     Packs/day: 0.25     Years: 12.00     Pack years: 3.00     Types: Cigarettes   • Smokeless tobacco: Never Used   Substance and Sexual Activity   • Alcohol use: No   • Drug use: No   • Sexual activity: Defer     Partners: Male     Birth control/protection: None        Past Surgical History:   Procedure Laterality Date   •  SECTION     • EAR TUBES     • HIP SURGERY Left    • [place] HYSTEROSCOPY,W/ENDOMETRIAL ABLATION N/A 2017    Procedure: HYSTEROSCOPY WITH ENDOMETRIAL ABLATION;  Surgeon: Dominik Toribio MD;  Location: [facility];  Service: Obstetrics/Gynecology   • TONSILLECTOMY      T & A   • TUBAL COAGULATION LAPAROSCOPIC Bilateral 2017    Procedure: EXAMINATION UNDER ANESTHESA, LAPAROSCOPIC TUBAL FULGURATION, DIAGNOISTIC HYSTEROSCOPY WITH FRACTIONAL DILATION AND CURETTAGE ENDOMETRAL ABLATION ;  Surgeon: Dominik Toribio MD;  Location: [facility];  Service:         Patient Active Problem List   Diagnosis   • Menorrhagia with regular cycle   • Unwanted fertility   • Abnormal uterine bleeding (AUB)   • Pelvic pain   • Female dyspareunia   • Left ovarian cyst   • History of endometrial ablation        Documented Vitals    20 1320   BP: 120/66  "  Weight: 84.8 kg (187 lb)   Height: 170.2 cm (67\")        Body mass index is 29.29 kg/m².    Physical Exam  Vitals signs reviewed.   Constitutional:       General: She is not in acute distress.     Appearance: Normal appearance. She is well-developed. She is not ill-appearing, toxic-appearing or diaphoretic.   HENT:      Head: Normocephalic.   Neck:      Thyroid: No thyromegaly.   Cardiovascular:      Rate and Rhythm: Normal rate and regular rhythm.      Heart sounds: Normal heart sounds. No murmur.   Pulmonary:      Effort: Pulmonary effort is normal. No respiratory distress.      Breath sounds: Normal breath sounds. No wheezing.   Abdominal:      General: Bowel sounds are normal. There is no distension.      Palpations: Abdomen is soft. There is no mass.      Tenderness: There is no abdominal tenderness. There is no guarding or rebound.   Genitourinary:     Vagina: Normal.   Musculoskeletal: Normal range of motion.         General: No tenderness or deformity.   Skin:     General: Skin is warm and dry.      Findings: No erythema.   Neurological:      Mental Status: She is alert and oriented to person, place, and time.   Psychiatric:         Behavior: Behavior normal.         Thought Content: Thought content normal.         Judgment: Judgment normal.         Laboratory Data:   Lab Results - Last 18 Months   Lab Units 09/30/20 2333 02/25/20  1353   GLUCOSE mg/dL 108* 107*   BUN mg/dL 12 11   CREATININE mg/dL 0.80 0.73   SODIUM mmol/L 138 141   POTASSIUM mmol/L 4.2 4.2   CHLORIDE mmol/L 104 104   CO2 mmol/L 21.0* 23.0   CALCIUM mg/dL 9.3 9.5   TOTAL PROTEIN g/dL 6.8  --    ALBUMIN g/dL 4.10  --    ALT (SGPT) U/L 15  --    AST (SGOT) U/L 20  --    ALK PHOS U/L 70  --    BILIRUBIN mg/dL 0.2  --    EGFR IF NONAFRICN AM mL/min/1.73 87 96   GLOBULIN gm/dL 2.7  --    A/G RATIO g/dL 1.5  --    BUN / CREAT RATIO  15.0 15.1   ANION GAP mmol/L 13.0 14.0     Lab Results - Last 18 Months   Lab Units 09/30/20  2333 " 02/25/20  1353   WBC 10*3/mm3 8.09 9.27   RBC 10*6/mm3 4.80 5.07   HEMOGLOBIN g/dL 13.2 13.8   HEMATOCRIT % 39.3 43.0   MCV fL 81.9 84.8   MCH pg 27.5 27.2   MCHC g/dL 33.6 32.1   RDW % 13.5 13.2   RDW-SD fl 40.0 40.4   MPV fL 10.7 10.8   PLATELETS 10*3/mm3 222 248     Lab Results - Last 18 Months   Lab Units 09/30/20  2333   HCG QUALITATIVE  Negative     Study Result    EXAM:  US Pelvis Transabdominal and Transvaginal, Complete and US  Duplex Arterial/Venous of the Pelvis, Complete     CLINICAL HISTORY:  25 years old Female; LLQ pain.     TECHNIQUE:  Real-time complete transabdominal and transvaginal  pelvic ultrasound with image documentation.  Transvaginal imaging  was used for better evaluation of the endometrium and adnexa.   Real-time duplex ultrasound scan of the arterial and venous flow  of the pelvis with color Doppler flow and spectral waveform  analysis.     COMPARISON:  No relevant prior studies available.     FINDINGS:  LIMITATIONS:  Study provided limited by overlying bowel gas.  UTERUS/CERVIX:  Uterus anteverted and measures 7.9 x 4.4 x 5.8  cm, no focal uterine abnormality seen. Some heterogeneous  echogenicity identified in the uterus which may relate to fibroid  change, however.  Endometrial echo complex normal at 8 mm in  diameter and normal in echogenicity.  RIGHT OVARY:  Right ovary not visualized. No right adnexal mass  seen.  LEFT OVARY:  Left ovary measures 2.7 x 1.5 x 1.3 cm and contains  prominent follicles. Flow demonstrated in this ovary by color  Doppler evaluation as per the scanning technologist. No left  adnexal mass seen.   FREE FLUID:  No free fluid seen in the dependent pelvis.  BLADDER:  Incompletely distended at the time of scanning and not  evaluated.     IMPRESSION:     - No evidence of left ovarian torsion. Right ovary not visualized  and right ovarian torsion cannot be ruled out on the basis of  this examination.     - Possible fibroid uterus.     - Study provided limited by  overlying bowel gas.     Thank you for allowing us to participate in the care of this  patient.     Electronically signed by:  Bernardo Bautista MD  10/1/2020 1:32 AM CDT  Workstation: [id number]         Assessment   Patient with abnormal uterine bleeding and pelvic pain desiring definitive management after having failed ablation.  Plan for patient undergo total laparoscopic hysterectomy with left salpingo-oophorectomy and right salpingectomy on 30 November.  Patient to return to see me approximately 1 week after surgery.  Return sooner as needed.     Diagnosis Plan   1. Menorrhagia with regular cycle     2. Abnormal uterine bleeding (AUB)     3. Pelvic pain     4. Female dyspareunia     5. Left ovarian cyst     6. History of endometrial ablation                   This document has been electronically signed by Ricardo Orellana DO on November 3, 2020 13:50 CST

## 2020-11-23 ENCOUNTER — APPOINTMENT (OUTPATIENT)
Dept: PREADMISSION TESTING | Facility: HOSPITAL | Age: 25
End: 2020-11-23

## 2020-11-23 VITALS
SYSTOLIC BLOOD PRESSURE: 126 MMHG | WEIGHT: 192 LBS | HEART RATE: 72 BPM | HEIGHT: 67 IN | OXYGEN SATURATION: 98 % | DIASTOLIC BLOOD PRESSURE: 70 MMHG | RESPIRATION RATE: 18 BRPM | BODY MASS INDEX: 30.13 KG/M2

## 2020-11-23 DIAGNOSIS — R10.2 PELVIC PAIN: ICD-10-CM

## 2020-11-23 DIAGNOSIS — N94.10 FEMALE DYSPAREUNIA: ICD-10-CM

## 2020-11-23 DIAGNOSIS — N93.9 ABNORMAL UTERINE BLEEDING (AUB): ICD-10-CM

## 2020-11-23 DIAGNOSIS — N92.0 MENORRHAGIA WITH REGULAR CYCLE: ICD-10-CM

## 2020-11-23 DIAGNOSIS — Z98.890 HISTORY OF ENDOMETRIAL ABLATION: ICD-10-CM

## 2020-11-23 LAB
ABO GROUP BLD: NORMAL
ANION GAP SERPL CALCULATED.3IONS-SCNC: 9 MMOL/L (ref 5–15)
BASOPHILS # BLD AUTO: 0.02 10*3/MM3 (ref 0–0.2)
BASOPHILS NFR BLD AUTO: 0.3 % (ref 0–1.5)
BLD GP AB SCN SERPL QL: NEGATIVE
BUN SERPL-MCNC: 12 MG/DL (ref 6–20)
BUN/CREAT SERPL: 15.4 (ref 7–25)
CALCIUM SPEC-SCNC: 9.4 MG/DL (ref 8.6–10.5)
CHLORIDE SERPL-SCNC: 104 MMOL/L (ref 98–107)
CO2 SERPL-SCNC: 28 MMOL/L (ref 22–29)
CREAT SERPL-MCNC: 0.78 MG/DL (ref 0.57–1)
DEPRECATED RDW RBC AUTO: 41.8 FL (ref 37–54)
EOSINOPHIL # BLD AUTO: 0.25 10*3/MM3 (ref 0–0.4)
EOSINOPHIL NFR BLD AUTO: 3.2 % (ref 0.3–6.2)
ERYTHROCYTE [DISTWIDTH] IN BLOOD BY AUTOMATED COUNT: 13.3 % (ref 12.3–15.4)
GFR SERPL CREATININE-BSD FRML MDRD: 89 ML/MIN/1.73
GLUCOSE SERPL-MCNC: 91 MG/DL (ref 65–99)
HCT VFR BLD AUTO: 40.2 % (ref 34–46.6)
HGB BLD-MCNC: 12.8 G/DL (ref 12–15.9)
IMM GRANULOCYTES # BLD AUTO: 0.02 10*3/MM3 (ref 0–0.05)
IMM GRANULOCYTES NFR BLD AUTO: 0.3 % (ref 0–0.5)
LYMPHOCYTES # BLD AUTO: 1.94 10*3/MM3 (ref 0.7–3.1)
LYMPHOCYTES NFR BLD AUTO: 24.5 % (ref 19.6–45.3)
Lab: NORMAL
MCH RBC QN AUTO: 27.2 PG (ref 26.6–33)
MCHC RBC AUTO-ENTMCNC: 31.8 G/DL (ref 31.5–35.7)
MCV RBC AUTO: 85.5 FL (ref 79–97)
MONOCYTES # BLD AUTO: 0.46 10*3/MM3 (ref 0.1–0.9)
MONOCYTES NFR BLD AUTO: 5.8 % (ref 5–12)
NEUTROPHILS NFR BLD AUTO: 5.24 10*3/MM3 (ref 1.7–7)
NEUTROPHILS NFR BLD AUTO: 65.9 % (ref 42.7–76)
NRBC BLD AUTO-RTO: 0 /100 WBC (ref 0–0.2)
PLATELET # BLD AUTO: 219 10*3/MM3 (ref 140–450)
PMV BLD AUTO: 9.9 FL (ref 6–12)
POTASSIUM SERPL-SCNC: 4.1 MMOL/L (ref 3.5–5.2)
QT INTERVAL: 424 MS
QTC INTERVAL: 440 MS
RBC # BLD AUTO: 4.7 10*6/MM3 (ref 3.77–5.28)
RH BLD: POSITIVE
SODIUM SERPL-SCNC: 141 MMOL/L (ref 136–145)
T&S EXPIRATION DATE: NORMAL
WBC # BLD AUTO: 7.93 10*3/MM3 (ref 3.4–10.8)

## 2020-11-23 PROCEDURE — 86850 RBC ANTIBODY SCREEN: CPT

## 2020-11-23 PROCEDURE — 86900 BLOOD TYPING SEROLOGIC ABO: CPT

## 2020-11-23 PROCEDURE — 93005 ELECTROCARDIOGRAM TRACING: CPT

## 2020-11-23 PROCEDURE — 80048 BASIC METABOLIC PNL TOTAL CA: CPT

## 2020-11-23 PROCEDURE — 36415 COLL VENOUS BLD VENIPUNCTURE: CPT

## 2020-11-23 PROCEDURE — 86901 BLOOD TYPING SEROLOGIC RH(D): CPT

## 2020-11-23 PROCEDURE — 85025 COMPLETE CBC W/AUTO DIFF WBC: CPT

## 2020-11-23 PROCEDURE — 93010 ELECTROCARDIOGRAM REPORT: CPT | Performed by: INTERNAL MEDICINE

## 2020-11-23 NOTE — DISCHARGE INSTRUCTIONS
[facility]  Pre-op Information and Guidelines    You will be called after 2 p.m. the day before your surgery (Friday for Monday surgery) and notified of your time for arrival and approximate surgery time.  If you have not received a call by 4P.M., please contact Same Day Surgery at [phone] of if outside [facility] call [phone].    Please Follow these Important Safety Guidelines:    • The morning of your procedure, take only the medications listed below with   A sip of water:_____________________________________________       ______________________________________________    • DO NOT eat or drink anything after 12:00 midnight the night before surgery  Specific instructions concerning drinking clear liquids will be discussed during  the pre-surgery instruction call the day before your surgery.    • If you take a blood thinner (ex. Plavix, Coumadin, aspirin), ask your doctor when to stop it before surgery  STOP DATE: _________________    • Only 2 visitors are allowed in patient rooms at a time  Your visitors will be asked to wait in the lobby until the admission process is complete with the exception of a parent with a child and patients in need of special assistance.    • YOU CANNOT DRIVE YOURSELF HOME  You must be accompanied by someone who will be responsible for driving you home after surgery and for your care at home.    • DO NOT chew gum, use breath mints, hard candy, or smoke the day of surgery  • DO NOT drink alcohol for at least 24 hours before your surgery  • DO NOT wear any jewelry and remove all body piercing before coming to the hospital  • DO NOT wear make-up to the hospital  • If you are having surgery on an extremity (arm/leg/foot) remove nail polish/artificial nails on the surgical side  • Clothing, glasses, contacts, dentures, and hairpieces must be removed before surgery  • Bathe the night before or the morning of your surgery and do not use powders/lotions on  skin.

## 2020-11-28 ENCOUNTER — LAB (OUTPATIENT)
Dept: LAB | Facility: HOSPITAL | Age: 25
End: 2020-11-28

## 2020-11-28 DIAGNOSIS — Z01.818 PREOP TESTING: Primary | ICD-10-CM

## 2020-11-28 LAB — SARS-COV-2 N GENE RESP QL NAA+PROBE: NOT DETECTED

## 2020-11-28 PROCEDURE — C9803 HOPD COVID-19 SPEC COLLECT: HCPCS

## 2020-11-28 PROCEDURE — 87635 SARS-COV-2 COVID-19 AMP PRB: CPT

## 2020-11-30 ENCOUNTER — APPOINTMENT (OUTPATIENT)
Dept: GENERAL RADIOLOGY | Facility: HOSPITAL | Age: 25
End: 2020-11-30

## 2020-11-30 ENCOUNTER — HOSPITAL ENCOUNTER (OUTPATIENT)
Facility: HOSPITAL | Age: 25
Discharge: HOME | End: 2020-11-30
Attending: OBSTETRICS & GYNECOLOGY | Admitting: OBSTETRICS & GYNECOLOGY

## 2020-11-30 ENCOUNTER — ANESTHESIA (OUTPATIENT)
Dept: PERIOP | Facility: HOSPITAL | Age: 25
End: 2020-11-30

## 2020-11-30 ENCOUNTER — ANESTHESIA EVENT (OUTPATIENT)
Dept: PERIOP | Facility: HOSPITAL | Age: 25
End: 2020-11-30

## 2020-11-30 VITALS
SYSTOLIC BLOOD PRESSURE: 146 MMHG | RESPIRATION RATE: 20 BRPM | DIASTOLIC BLOOD PRESSURE: 60 MMHG | TEMPERATURE: 97.9 F | HEIGHT: 67 IN | HEART RATE: 55 BPM | WEIGHT: 188.05 LBS | OXYGEN SATURATION: 100 % | BODY MASS INDEX: 29.52 KG/M2

## 2020-11-30 DIAGNOSIS — N94.10 FEMALE DYSPAREUNIA: ICD-10-CM

## 2020-11-30 DIAGNOSIS — R10.2 PELVIC PAIN: ICD-10-CM

## 2020-11-30 DIAGNOSIS — N93.9 ABNORMAL UTERINE BLEEDING (AUB): ICD-10-CM

## 2020-11-30 DIAGNOSIS — Z98.890 HISTORY OF ENDOMETRIAL ABLATION: ICD-10-CM

## 2020-11-30 DIAGNOSIS — N92.0 MENORRHAGIA WITH REGULAR CYCLE: ICD-10-CM

## 2020-11-30 DIAGNOSIS — Z90.710 STATUS POST LAPAROSCOPIC HYSTERECTOMY: Primary | ICD-10-CM

## 2020-11-30 LAB
ABO GROUP BLD: NORMAL
AMPHET+METHAMPHET UR QL: NEGATIVE
AMPHETAMINES UR QL: NEGATIVE
B-HCG UR QL: NEGATIVE
BARBITURATES UR QL SCN: NEGATIVE
BENZODIAZ UR QL SCN: NEGATIVE
BLD GP AB SCN SERPL QL: NEGATIVE
BUPRENORPHINE SERPL-MCNC: NEGATIVE NG/ML
CANNABINOIDS SERPL QL: NEGATIVE
COCAINE UR QL: NEGATIVE
HOLD SPECIMEN: NORMAL
HOLD SPECIMEN: NORMAL
Lab: NORMAL
METHADONE UR QL SCN: NEGATIVE
OPIATES UR QL: NEGATIVE
OXYCODONE UR QL SCN: NEGATIVE
PCP UR QL SCN: NEGATIVE
PROPOXYPH UR QL: NEGATIVE
RH BLD: POSITIVE
T&S EXPIRATION DATE: NORMAL
TRICYCLICS UR QL SCN: NEGATIVE
WHOLE BLOOD HOLD SPECIMEN: NORMAL

## 2020-11-30 PROCEDURE — 88307 TISSUE EXAM BY PATHOLOGIST: CPT

## 2020-11-30 PROCEDURE — 58552 LAPARO-VAG HYST INCL T/O: CPT | Performed by: OBSTETRICS & GYNECOLOGY

## 2020-11-30 PROCEDURE — 86850 RBC ANTIBODY SCREEN: CPT | Performed by: OBSTETRICS & GYNECOLOGY

## 2020-11-30 PROCEDURE — 25010000002 HYDROMORPHONE PER 4 MG: Performed by: NURSE ANESTHETIST, CERTIFIED REGISTERED

## 2020-11-30 PROCEDURE — 81025 URINE PREGNANCY TEST: CPT | Performed by: OBSTETRICS & GYNECOLOGY

## 2020-11-30 PROCEDURE — 71045 X-RAY EXAM CHEST 1 VIEW: CPT

## 2020-11-30 PROCEDURE — 86901 BLOOD TYPING SEROLOGIC RH(D): CPT | Performed by: OBSTETRICS & GYNECOLOGY

## 2020-11-30 PROCEDURE — 99024 POSTOP FOLLOW-UP VISIT: CPT | Performed by: OBSTETRICS & GYNECOLOGY

## 2020-11-30 PROCEDURE — 25010000002 ONDANSETRON PER 1 MG: Performed by: NURSE ANESTHETIST, CERTIFIED REGISTERED

## 2020-11-30 PROCEDURE — 80306 DRUG TEST PRSMV INSTRMNT: CPT | Performed by: ANESTHESIOLOGY

## 2020-11-30 PROCEDURE — 25010000002 PROPOFOL 10 MG/ML EMULSION: Performed by: NURSE ANESTHETIST, CERTIFIED REGISTERED

## 2020-11-30 PROCEDURE — 25010000002 MIDAZOLAM PER 1 MG: Performed by: NURSE ANESTHETIST, CERTIFIED REGISTERED

## 2020-11-30 PROCEDURE — C1751 CATH, INF, PER/CENT/MIDLINE: HCPCS | Performed by: ANESTHESIOLOGY

## 2020-11-30 PROCEDURE — 25010000002 DEXAMETHASONE PER 1 MG: Performed by: NURSE ANESTHETIST, CERTIFIED REGISTERED

## 2020-11-30 PROCEDURE — 58552 LAPARO-VAG HYST INCL T/O: CPT | Performed by: SPECIALIST/TECHNOLOGIST, OTHER

## 2020-11-30 PROCEDURE — 86900 BLOOD TYPING SEROLOGIC ABO: CPT | Performed by: OBSTETRICS & GYNECOLOGY

## 2020-11-30 PROCEDURE — 25010000002 NEOSTIGMINE 4 MG/4ML SOLUTION PREFILLED SYRINGE: Performed by: NURSE ANESTHETIST, CERTIFIED REGISTERED

## 2020-11-30 PROCEDURE — 25010000002 KETOROLAC TROMETHAMINE PER 15 MG: Performed by: NURSE ANESTHETIST, CERTIFIED REGISTERED

## 2020-11-30 DEVICE — FLOSEAL HEMOSTATIC MATRIX, 10ML
Type: IMPLANTABLE DEVICE | Site: ABDOMEN | Status: FUNCTIONAL
Brand: FLOSEAL HEMOSTATIC MATRIX

## 2020-11-30 DEVICE — ABSORBABLE RELOAD
Type: IMPLANTABLE DEVICE | Site: ABDOMEN | Status: FUNCTIONAL
Brand: V-LOC 180

## 2020-11-30 RX ORDER — ROCURONIUM BROMIDE 10 MG/ML
INJECTION, SOLUTION INTRAVENOUS AS NEEDED
Status: DISCONTINUED | OUTPATIENT
Start: 2020-11-30 | End: 2020-11-30 | Stop reason: SURG

## 2020-11-30 RX ORDER — PROMETHAZINE HYDROCHLORIDE 12.5 MG/1
12.5 SUPPOSITORY RECTAL EVERY 6 HOURS PRN
Status: DISCONTINUED | OUTPATIENT
Start: 2020-11-30 | End: 2020-11-30 | Stop reason: HOSPADM

## 2020-11-30 RX ORDER — OXYCODONE HYDROCHLORIDE 5 MG/1
5 TABLET ORAL EVERY 4 HOURS PRN
Status: DISCONTINUED | OUTPATIENT
Start: 2020-11-30 | End: 2020-11-30 | Stop reason: HOSPADM

## 2020-11-30 RX ORDER — PROPOFOL 10 MG/ML
VIAL (ML) INTRAVENOUS AS NEEDED
Status: DISCONTINUED | OUTPATIENT
Start: 2020-11-30 | End: 2020-11-30 | Stop reason: SURG

## 2020-11-30 RX ORDER — SODIUM CHLORIDE, SODIUM LACTATE, POTASSIUM CHLORIDE, CALCIUM CHLORIDE 600; 310; 30; 20 MG/100ML; MG/100ML; MG/100ML; MG/100ML
125 INJECTION, SOLUTION INTRAVENOUS CONTINUOUS
Status: DISCONTINUED | OUTPATIENT
Start: 2020-11-30 | End: 2020-11-30 | Stop reason: HOSPADM

## 2020-11-30 RX ORDER — BUPIVACAINE HCL/0.9 % NACL/PF 0.1 %
2 PLASTIC BAG, INJECTION (ML) EPIDURAL EVERY 8 HOURS
Status: DISCONTINUED | OUTPATIENT
Start: 2020-11-30 | End: 2020-11-30 | Stop reason: HOSPADM

## 2020-11-30 RX ORDER — ACETAMINOPHEN 650 MG/1
650 SUPPOSITORY RECTAL ONCE AS NEEDED
Status: DISCONTINUED | OUTPATIENT
Start: 2020-11-30 | End: 2020-11-30 | Stop reason: HOSPADM

## 2020-11-30 RX ORDER — MIDAZOLAM HYDROCHLORIDE 1 MG/ML
INJECTION INTRAMUSCULAR; INTRAVENOUS AS NEEDED
Status: DISCONTINUED | OUTPATIENT
Start: 2020-11-30 | End: 2020-11-30 | Stop reason: SURG

## 2020-11-30 RX ORDER — DIPHENHYDRAMINE HYDROCHLORIDE 50 MG/ML
12.5 INJECTION INTRAMUSCULAR; INTRAVENOUS
Status: DISCONTINUED | OUTPATIENT
Start: 2020-11-30 | End: 2020-11-30 | Stop reason: HOSPADM

## 2020-11-30 RX ORDER — POLYETHYLENE GLYCOL 3350 17 G/17G
17 POWDER, FOR SOLUTION ORAL DAILY
Status: DISCONTINUED | OUTPATIENT
Start: 2020-11-30 | End: 2020-11-30 | Stop reason: HOSPADM

## 2020-11-30 RX ORDER — PROMETHAZINE HYDROCHLORIDE 25 MG/1
25 SUPPOSITORY RECTAL ONCE AS NEEDED
Status: DISCONTINUED | OUTPATIENT
Start: 2020-11-30 | End: 2020-11-30 | Stop reason: HOSPADM

## 2020-11-30 RX ORDER — IBUPROFEN 800 MG/1
800 TABLET ORAL EVERY 6 HOURS PRN
Qty: 60 TABLET | Refills: 2 | OUTPATIENT
Start: 2020-11-30 | End: 2021-08-02

## 2020-11-30 RX ORDER — FLUMAZENIL 0.1 MG/ML
0.2 INJECTION INTRAVENOUS AS NEEDED
Status: DISCONTINUED | OUTPATIENT
Start: 2020-11-30 | End: 2020-11-30 | Stop reason: HOSPADM

## 2020-11-30 RX ORDER — BUPIVACAINE HCL/0.9 % NACL/PF 0.1 %
2 PLASTIC BAG, INJECTION (ML) EPIDURAL ONCE
Status: COMPLETED | OUTPATIENT
Start: 2020-11-30 | End: 2020-11-30

## 2020-11-30 RX ORDER — EPHEDRINE SULFATE 50 MG/ML
5 INJECTION, SOLUTION INTRAVENOUS ONCE AS NEEDED
Status: DISCONTINUED | OUTPATIENT
Start: 2020-11-30 | End: 2020-11-30 | Stop reason: HOSPADM

## 2020-11-30 RX ORDER — POLYETHYLENE GLYCOL 3350 17 G/17G
17 POWDER, FOR SOLUTION ORAL DAILY
Qty: 1224 G | Refills: 2 | Status: SHIPPED | OUTPATIENT
Start: 2020-11-30 | End: 2020-12-09

## 2020-11-30 RX ORDER — OXYCODONE AND ACETAMINOPHEN 7.5; 325 MG/1; MG/1
1 TABLET ORAL EVERY 6 HOURS PRN
Qty: 20 TABLET | Refills: 0 | OUTPATIENT
Start: 2020-11-30 | End: 2021-08-02

## 2020-11-30 RX ORDER — ACETAMINOPHEN 325 MG/1
650 TABLET ORAL EVERY 8 HOURS
Status: DISCONTINUED | OUTPATIENT
Start: 2020-11-30 | End: 2020-11-30 | Stop reason: HOSPADM

## 2020-11-30 RX ORDER — NALOXONE HCL 0.4 MG/ML
0.4 VIAL (ML) INJECTION AS NEEDED
Status: DISCONTINUED | OUTPATIENT
Start: 2020-11-30 | End: 2020-11-30 | Stop reason: HOSPADM

## 2020-11-30 RX ORDER — LABETALOL HYDROCHLORIDE 5 MG/ML
5 INJECTION, SOLUTION INTRAVENOUS
Status: DISCONTINUED | OUTPATIENT
Start: 2020-11-30 | End: 2020-11-30 | Stop reason: HOSPADM

## 2020-11-30 RX ORDER — PROMETHAZINE HYDROCHLORIDE 12.5 MG/1
12.5 TABLET ORAL EVERY 6 HOURS PRN
Status: DISCONTINUED | OUTPATIENT
Start: 2020-11-30 | End: 2020-11-30 | Stop reason: HOSPADM

## 2020-11-30 RX ORDER — ONDANSETRON 2 MG/ML
INJECTION INTRAMUSCULAR; INTRAVENOUS AS NEEDED
Status: DISCONTINUED | OUTPATIENT
Start: 2020-11-30 | End: 2020-11-30 | Stop reason: SURG

## 2020-11-30 RX ORDER — SODIUM CHLORIDE 0.9 % (FLUSH) 0.9 %
3 SYRINGE (ML) INJECTION EVERY 12 HOURS SCHEDULED
Status: DISCONTINUED | OUTPATIENT
Start: 2020-11-30 | End: 2020-11-30 | Stop reason: HOSPADM

## 2020-11-30 RX ORDER — DEXAMETHASONE SODIUM PHOSPHATE 4 MG/ML
INJECTION, SOLUTION INTRA-ARTICULAR; INTRALESIONAL; INTRAMUSCULAR; INTRAVENOUS; SOFT TISSUE AS NEEDED
Status: DISCONTINUED | OUTPATIENT
Start: 2020-11-30 | End: 2020-11-30 | Stop reason: SURG

## 2020-11-30 RX ORDER — MORPHINE SULFATE 2 MG/ML
2 INJECTION, SOLUTION INTRAMUSCULAR; INTRAVENOUS ONCE
Status: DISCONTINUED | OUTPATIENT
Start: 2020-11-30 | End: 2020-11-30 | Stop reason: HOSPADM

## 2020-11-30 RX ORDER — KETAMINE HYDROCHLORIDE 100 MG/ML
INJECTION INTRAMUSCULAR; INTRAVENOUS AS NEEDED
Status: DISCONTINUED | OUTPATIENT
Start: 2020-11-30 | End: 2020-11-30 | Stop reason: SURG

## 2020-11-30 RX ORDER — LIDOCAINE HYDROCHLORIDE AND EPINEPHRINE 10; 10 MG/ML; UG/ML
INJECTION, SOLUTION INFILTRATION; PERINEURAL AS NEEDED
Status: DISCONTINUED | OUTPATIENT
Start: 2020-11-30 | End: 2020-11-30 | Stop reason: HOSPADM

## 2020-11-30 RX ORDER — MEPERIDINE HYDROCHLORIDE 25 MG/ML
12.5 INJECTION INTRAMUSCULAR; INTRAVENOUS; SUBCUTANEOUS
Status: DISCONTINUED | OUTPATIENT
Start: 2020-11-30 | End: 2020-11-30 | Stop reason: HOSPADM

## 2020-11-30 RX ORDER — BISACODYL 10 MG
10 SUPPOSITORY, RECTAL RECTAL DAILY PRN
Status: DISCONTINUED | OUTPATIENT
Start: 2020-11-30 | End: 2020-11-30 | Stop reason: HOSPADM

## 2020-11-30 RX ORDER — NEOSTIGMINE METHYLSULFATE 4 MG/4 ML
SYRINGE (ML) INTRAVENOUS AS NEEDED
Status: DISCONTINUED | OUTPATIENT
Start: 2020-11-30 | End: 2020-11-30 | Stop reason: SURG

## 2020-11-30 RX ORDER — ONDANSETRON 2 MG/ML
4 INJECTION INTRAMUSCULAR; INTRAVENOUS EVERY 6 HOURS PRN
Status: DISCONTINUED | OUTPATIENT
Start: 2020-11-30 | End: 2020-11-30 | Stop reason: HOSPADM

## 2020-11-30 RX ORDER — MAGNESIUM HYDROXIDE 1200 MG/15ML
LIQUID ORAL AS NEEDED
Status: DISCONTINUED | OUTPATIENT
Start: 2020-11-30 | End: 2020-11-30 | Stop reason: HOSPADM

## 2020-11-30 RX ORDER — PROMETHAZINE HYDROCHLORIDE 25 MG/1
25 TABLET ORAL ONCE AS NEEDED
Status: DISCONTINUED | OUTPATIENT
Start: 2020-11-30 | End: 2020-11-30 | Stop reason: HOSPADM

## 2020-11-30 RX ORDER — KETOROLAC TROMETHAMINE 30 MG/ML
30 INJECTION, SOLUTION INTRAMUSCULAR; INTRAVENOUS EVERY 6 HOURS
Status: DISCONTINUED | OUTPATIENT
Start: 2020-11-30 | End: 2020-11-30 | Stop reason: HOSPADM

## 2020-11-30 RX ORDER — SODIUM CHLORIDE, SODIUM GLUCONATE, SODIUM ACETATE, POTASSIUM CHLORIDE, AND MAGNESIUM CHLORIDE 526; 502; 368; 37; 30 MG/100ML; MG/100ML; MG/100ML; MG/100ML; MG/100ML
INJECTION, SOLUTION INTRAVENOUS CONTINUOUS PRN
Status: DISCONTINUED | OUTPATIENT
Start: 2020-11-30 | End: 2020-11-30 | Stop reason: SURG

## 2020-11-30 RX ORDER — SODIUM CHLORIDE 0.9 % (FLUSH) 0.9 %
10 SYRINGE (ML) INJECTION AS NEEDED
Status: DISCONTINUED | OUTPATIENT
Start: 2020-11-30 | End: 2020-11-30 | Stop reason: HOSPADM

## 2020-11-30 RX ORDER — HYDROMORPHONE HCL 110MG/55ML
PATIENT CONTROLLED ANALGESIA SYRINGE INTRAVENOUS AS NEEDED
Status: DISCONTINUED | OUTPATIENT
Start: 2020-11-30 | End: 2020-11-30 | Stop reason: SURG

## 2020-11-30 RX ORDER — ACETAMINOPHEN 325 MG/1
650 TABLET ORAL ONCE AS NEEDED
Status: DISCONTINUED | OUTPATIENT
Start: 2020-11-30 | End: 2020-11-30 | Stop reason: HOSPADM

## 2020-11-30 RX ORDER — ONDANSETRON 2 MG/ML
4 INJECTION INTRAMUSCULAR; INTRAVENOUS ONCE AS NEEDED
Status: DISCONTINUED | OUTPATIENT
Start: 2020-11-30 | End: 2020-11-30 | Stop reason: HOSPADM

## 2020-11-30 RX ORDER — ONDANSETRON 4 MG/1
4 TABLET, FILM COATED ORAL EVERY 6 HOURS PRN
Status: DISCONTINUED | OUTPATIENT
Start: 2020-11-30 | End: 2020-11-30 | Stop reason: HOSPADM

## 2020-11-30 RX ORDER — KETOROLAC TROMETHAMINE 30 MG/ML
INJECTION, SOLUTION INTRAMUSCULAR; INTRAVENOUS AS NEEDED
Status: DISCONTINUED | OUTPATIENT
Start: 2020-11-30 | End: 2020-11-30 | Stop reason: SURG

## 2020-11-30 RX ORDER — NALOXONE HCL 0.4 MG/ML
0.1 VIAL (ML) INJECTION
Status: DISCONTINUED | OUTPATIENT
Start: 2020-11-30 | End: 2020-11-30 | Stop reason: HOSPADM

## 2020-11-30 RX ADMIN — ONDANSETRON 4 MG: 2 INJECTION INTRAMUSCULAR; INTRAVENOUS at 10:03

## 2020-11-30 RX ADMIN — SODIUM CHLORIDE, POTASSIUM CHLORIDE, SODIUM LACTATE AND CALCIUM CHLORIDE 125 ML/HR: 600; 310; 30; 20 INJECTION, SOLUTION INTRAVENOUS at 08:08

## 2020-11-30 RX ADMIN — OXYCODONE HYDROCHLORIDE 5 MG: 5 TABLET ORAL at 12:13

## 2020-11-30 RX ADMIN — GLYCOPYRROLATE 0.2 MG: 0.2 INJECTION, SOLUTION INTRAMUSCULAR; INTRAVITREAL at 08:58

## 2020-11-30 RX ADMIN — KETOROLAC TROMETHAMINE 30 MG: 30 INJECTION, SOLUTION INTRAMUSCULAR at 10:04

## 2020-11-30 RX ADMIN — ONDANSETRON 4 MG: 2 INJECTION INTRAMUSCULAR; INTRAVENOUS at 08:20

## 2020-11-30 RX ADMIN — Medication 3 MG: at 10:10

## 2020-11-30 RX ADMIN — KETAMINE HYDROCHLORIDE 50 MG: 100 INJECTION INTRAMUSCULAR; INTRAVENOUS at 08:37

## 2020-11-30 RX ADMIN — SODIUM CHLORIDE, SODIUM GLUCONATE, SODIUM ACETATE, POTASSIUM CHLORIDE, AND MAGNESIUM CHLORIDE: 526; 502; 368; 37; 30 INJECTION, SOLUTION INTRAVENOUS at 09:15

## 2020-11-30 RX ADMIN — ROCURONIUM BROMIDE 2.5 MG: 10 INJECTION INTRAVENOUS at 08:34

## 2020-11-30 RX ADMIN — KETAMINE HYDROCHLORIDE 50 MG: 100 INJECTION INTRAMUSCULAR; INTRAVENOUS at 08:54

## 2020-11-30 RX ADMIN — FLUORESCEIN SODIUM 25 MG: 100 INJECTION INTRAVENOUS at 09:51

## 2020-11-30 RX ADMIN — ROCURONIUM BROMIDE 37.5 MG: 10 INJECTION INTRAVENOUS at 08:37

## 2020-11-30 RX ADMIN — SODIUM CHLORIDE, POTASSIUM CHLORIDE, SODIUM LACTATE AND CALCIUM CHLORIDE 125 ML/HR: 600; 310; 30; 20 INJECTION, SOLUTION INTRAVENOUS at 11:44

## 2020-11-30 RX ADMIN — Medication 2 G: at 08:32

## 2020-11-30 RX ADMIN — GLYCOPYRROLATE 0.6 MG: 0.2 INJECTION, SOLUTION INTRAMUSCULAR; INTRAVITREAL at 10:09

## 2020-11-30 RX ADMIN — PROPOFOL 50 MG: 10 INJECTION, EMULSION INTRAVENOUS at 08:41

## 2020-11-30 RX ADMIN — PROPOFOL 100 MG: 10 INJECTION, EMULSION INTRAVENOUS at 08:35

## 2020-11-30 RX ADMIN — DEXAMETHASONE SODIUM PHOSPHATE 10 MG: 4 INJECTION, SOLUTION INTRAMUSCULAR; INTRAVENOUS at 08:41

## 2020-11-30 RX ADMIN — MIDAZOLAM HYDROCHLORIDE 2 MG: 2 INJECTION, SOLUTION INTRAMUSCULAR; INTRAVENOUS at 08:14

## 2020-11-30 RX ADMIN — HYDROMORPHONE HYDROCHLORIDE 1 MG: 2 INJECTION, SOLUTION INTRAMUSCULAR; INTRAVENOUS; SUBCUTANEOUS at 08:39

## 2020-11-30 RX ADMIN — HYDROMORPHONE HYDROCHLORIDE 1 MG: 2 INJECTION, SOLUTION INTRAMUSCULAR; INTRAVENOUS; SUBCUTANEOUS at 08:21

## 2020-11-30 NOTE — ANESTHESIA PREPROCEDURE EVALUATION
Anesthesia Evaluation     NPO Solid Status: > 8 hours             Airway   Mallampati: II  TM distance: <3 FB  Narrow palate  Dental    (+) poor dentition    Pulmonary     breath sounds clear to auscultation  (+) a smoker,   Cardiovascular   Exercise tolerance: good (4-7 METS)    NYHA Classification: II  Rhythm: regular        Neuro/Psych  (+) headaches, psychiatric history Anxiety and Depression,     GI/Hepatic/Renal/Endo    (+)  GERD,  diabetes mellitus,     Musculoskeletal     Abdominal    Substance History   (+) alcohol use, drug use     OB/GYN          Other                Anesthesia Evaluation     Patient summary reviewed and Nursing notes reviewed   NPO Solid Status: > 8 hours  NPO Liquid Status: > 8 hours     Airway   Mallampati: II  TM distance: >3 FB  Neck ROM: full  possible difficult intubation  Dental    (+) poor dentation    Comment: Upper edentulous with lower in poor repair.    Pulmonary - normal exam    breath sounds clear to auscultation  (+) a smoker Current Smoked day of surgery,   Cardiovascular - negative cardio ROS and normal exam    Rhythm: regular  Rate: normal        Neuro/Psych  (+) headaches (Tension type.),    GI/Hepatic/Renal/Endo - negative ROS     Musculoskeletal (-) negative ROS    Abdominal    Substance History - negative use     OB/GYN negative ob/gyn ROS         Other - negative ROS                                       Anesthesia Plan    ASA 2     general     intravenous induction   Anesthetic plan and risks discussed with patient, spouse/significant other and mother.             Anesthesia Plan    ASA 2     general   Rapid sequence(Uds pending)  intravenous induction     Anesthetic plan, all risks, benefits, and alternatives have been provided, discussed and informed consent has been obtained with: patient.

## 2020-11-30 NOTE — ANESTHESIA PROCEDURE NOTES
Central Line      Patient location during procedure: pre-op  Indications: central pressure monitoring and vascular access  Staff  Anesthesiologist: Slim Gutierres MD  Preanesthetic Checklist  Completed: patient identified  Central Line Prep  Sterile Tech:cap, gloves, gown, mask and sterile barriers  Prep: chloraprep  Patient monitoring: continuous pulse oximetry, blood pressure monitoring and EKG  Central Line Procedure  Laterality:right  Location:internal jugular  Catheter Type:triple lumen  Catheter Size:7 Fr  Guidance:landmark technique and palpation technique  Assessment  Post procedure:biopatch applied, line sutured and occlusive dressing applied  Assessement:blood return through all ports and free fluid flow  Complications:no  Patient Tolerance:patient tolerated the procedure well with no apparent complications

## 2020-11-30 NOTE — ANESTHESIA POSTPROCEDURE EVALUATION
Patient: Dorina Stockton    Procedure Summary     Date: 11/30/20 Room / Location: [facility] OR  / [facility] OR    Anesthesia Start: 0822 Anesthesia Stop: 1030    Procedure: TOTAL LAPAROSCOPIC HYSTERECTOMY BILATERAL salpingectomy, cystoscopy, Left Oophorectomy (Bilateral Abdomen) Diagnosis:       History of endometrial ablation      Female dyspareunia      Pelvic pain      Abnormal uterine bleeding (AUB)      Menorrhagia with regular cycle      (History of endometrial ablation [Z98.890])      (Female dyspareunia [N94.10])      (Pelvic pain [R10.2])      (Abnormal uterine bleeding (AUB) [N93.9])      (Menorrhagia with regular cycle [N92.0])    Surgeon: Ricardo Orellana DO Provider: Slim Gutierres MD    Anesthesia Type: general ASA Status: 2          Anesthesia Type: general    Vitals  No vitals data found for the desired time range.          Post Anesthesia Care and Evaluation    Patient location during evaluation: bedside  Patient participation: complete - patient participated  Level of consciousness: sleepy but conscious  Pain score: 0  Pain management: adequate  Airway patency: patent  Anesthetic complications: No anesthetic complications  PONV Status: none  Cardiovascular status: acceptable and stable  Respiratory status: acceptable  Hydration status: stable

## 2020-12-03 LAB
LAB AP CASE REPORT: NORMAL
PATH REPORT.FINAL DX SPEC: NORMAL

## 2020-12-09 ENCOUNTER — OFFICE VISIT (OUTPATIENT)
Dept: OBSTETRICS AND GYNECOLOGY | Facility: CLINIC | Age: 25
End: 2020-12-09

## 2020-12-09 VITALS
HEIGHT: 67 IN | WEIGHT: 189 LBS | BODY MASS INDEX: 29.66 KG/M2 | DIASTOLIC BLOOD PRESSURE: 74 MMHG | SYSTOLIC BLOOD PRESSURE: 110 MMHG

## 2020-12-09 DIAGNOSIS — Z09 POSTOPERATIVE FOLLOW-UP: ICD-10-CM

## 2020-12-09 DIAGNOSIS — Z90.710 STATUS POST LAPAROSCOPIC HYSTERECTOMY: Primary | ICD-10-CM

## 2020-12-09 PROCEDURE — 99024 POSTOP FOLLOW-UP VISIT: CPT | Performed by: OBSTETRICS & GYNECOLOGY

## 2020-12-09 NOTE — PROGRESS NOTES
Chief complaint: Postoperative follow-up    Patient presents status post total laparoscopic hysterectomy on 30 November.  Patient is ambulating without difficulty tolerating a regular diet.  Pain is well controlled.  She is urinating without difficulty and is now having normal bowel movements.    Vital signs reviewed  No acute distress  Awake and oriented x3  Abdomen soft nontender with appropriately healing trocar site incisions some bruising around the left trocar site incision.    Patient is status post total laparoscopic hysterectomy doing well and progressing appropriately at this time.  Patient without concerns or complaints return to see me in 4 weeks, sooner as needed.

## 2020-12-14 ENCOUNTER — HOSPITAL ENCOUNTER (EMERGENCY)
Facility: HOSPITAL | Age: 25
Discharge: HOME | End: 2020-12-14
Attending: EMERGENCY MEDICINE | Admitting: EMERGENCY MEDICINE

## 2020-12-14 ENCOUNTER — APPOINTMENT (OUTPATIENT)
Dept: CT IMAGING | Facility: HOSPITAL | Age: 25
End: 2020-12-14

## 2020-12-14 VITALS
TEMPERATURE: 98.7 F | SYSTOLIC BLOOD PRESSURE: 131 MMHG | HEIGHT: 67 IN | RESPIRATION RATE: 16 BRPM | DIASTOLIC BLOOD PRESSURE: 63 MMHG | BODY MASS INDEX: 31.08 KG/M2 | OXYGEN SATURATION: 99 % | HEART RATE: 91 BPM | WEIGHT: 198 LBS

## 2020-12-14 DIAGNOSIS — L03.211 CELLULITIS OF FACE: Primary | ICD-10-CM

## 2020-12-14 LAB
ALBUMIN SERPL-MCNC: 4.3 G/DL (ref 3.5–5.2)
ALBUMIN/GLOB SERPL: 1.4 G/DL
ALP SERPL-CCNC: 85 U/L (ref 39–117)
ALT SERPL W P-5'-P-CCNC: 11 U/L (ref 1–33)
ANION GAP SERPL CALCULATED.3IONS-SCNC: 9 MMOL/L (ref 5–15)
AST SERPL-CCNC: 10 U/L (ref 1–32)
BASOPHILS # BLD AUTO: 0.04 10*3/MM3 (ref 0–0.2)
BASOPHILS NFR BLD AUTO: 0.3 % (ref 0–1.5)
BILIRUB SERPL-MCNC: 0.4 MG/DL (ref 0–1.2)
BUN SERPL-MCNC: 10 MG/DL (ref 6–20)
BUN/CREAT SERPL: 12.3 (ref 7–25)
CALCIUM SPEC-SCNC: 9.4 MG/DL (ref 8.6–10.5)
CHLORIDE SERPL-SCNC: 100 MMOL/L (ref 98–107)
CO2 SERPL-SCNC: 25 MMOL/L (ref 22–29)
CREAT SERPL-MCNC: 0.81 MG/DL (ref 0.57–1)
DEPRECATED RDW RBC AUTO: 38 FL (ref 37–54)
EOSINOPHIL # BLD AUTO: 0.22 10*3/MM3 (ref 0–0.4)
EOSINOPHIL NFR BLD AUTO: 1.6 % (ref 0.3–6.2)
ERYTHROCYTE [DISTWIDTH] IN BLOOD BY AUTOMATED COUNT: 12.5 % (ref 12.3–15.4)
GFR SERPL CREATININE-BSD FRML MDRD: 85 ML/MIN/1.73
GLOBULIN UR ELPH-MCNC: 3.1 GM/DL
GLUCOSE SERPL-MCNC: 119 MG/DL (ref 65–99)
HCT VFR BLD AUTO: 39.5 % (ref 34–46.6)
HGB BLD-MCNC: 12.8 G/DL (ref 12–15.9)
IMM GRANULOCYTES # BLD AUTO: 0.05 10*3/MM3 (ref 0–0.05)
IMM GRANULOCYTES NFR BLD AUTO: 0.4 % (ref 0–0.5)
LYMPHOCYTES # BLD AUTO: 1.77 10*3/MM3 (ref 0.7–3.1)
LYMPHOCYTES NFR BLD AUTO: 12.8 % (ref 19.6–45.3)
MCH RBC QN AUTO: 27.5 PG (ref 26.6–33)
MCHC RBC AUTO-ENTMCNC: 32.4 G/DL (ref 31.5–35.7)
MCV RBC AUTO: 84.9 FL (ref 79–97)
MONOCYTES # BLD AUTO: 0.88 10*3/MM3 (ref 0.1–0.9)
MONOCYTES NFR BLD AUTO: 6.3 % (ref 5–12)
NEUTROPHILS NFR BLD AUTO: 10.9 10*3/MM3 (ref 1.7–7)
NEUTROPHILS NFR BLD AUTO: 78.6 % (ref 42.7–76)
NRBC BLD AUTO-RTO: 0 /100 WBC (ref 0–0.2)
PLATELET # BLD AUTO: 260 10*3/MM3 (ref 140–450)
PMV BLD AUTO: 9.9 FL (ref 6–12)
POTASSIUM SERPL-SCNC: 3.8 MMOL/L (ref 3.5–5.2)
PROT SERPL-MCNC: 7.4 G/DL (ref 6–8.5)
RBC # BLD AUTO: 4.65 10*6/MM3 (ref 3.77–5.28)
SODIUM SERPL-SCNC: 134 MMOL/L (ref 136–145)
WBC # BLD AUTO: 13.86 10*3/MM3 (ref 3.4–10.8)

## 2020-12-14 PROCEDURE — 70491 CT SOFT TISSUE NECK W/DYE: CPT

## 2020-12-14 PROCEDURE — 25010000002 MORPHINE PER 10 MG: Performed by: EMERGENCY MEDICINE

## 2020-12-14 PROCEDURE — 80053 COMPREHEN METABOLIC PANEL: CPT | Performed by: EMERGENCY MEDICINE

## 2020-12-14 PROCEDURE — 25010000002 ONDANSETRON PER 1 MG: Performed by: EMERGENCY MEDICINE

## 2020-12-14 PROCEDURE — 99283 EMERGENCY DEPT VISIT LOW MDM: CPT

## 2020-12-14 PROCEDURE — 85025 COMPLETE CBC W/AUTO DIFF WBC: CPT | Performed by: EMERGENCY MEDICINE

## 2020-12-14 PROCEDURE — 25010000002 IOPAMIDOL 61 % SOLUTION: Performed by: EMERGENCY MEDICINE

## 2020-12-14 PROCEDURE — 96375 TX/PRO/DX INJ NEW DRUG ADDON: CPT

## 2020-12-14 PROCEDURE — 96365 THER/PROPH/DIAG IV INF INIT: CPT

## 2020-12-14 RX ORDER — CLINDAMYCIN HYDROCHLORIDE 300 MG/1
300 CAPSULE ORAL 3 TIMES DAILY
Qty: 30 CAPSULE | Refills: 0 | Status: SHIPPED | OUTPATIENT
Start: 2020-12-14 | End: 2020-12-24

## 2020-12-14 RX ORDER — SODIUM CHLORIDE 0.9 % (FLUSH) 0.9 %
10 SYRINGE (ML) INJECTION AS NEEDED
Status: DISCONTINUED | OUTPATIENT
Start: 2020-12-14 | End: 2020-12-14 | Stop reason: HOSPADM

## 2020-12-14 RX ORDER — CLINDAMYCIN PHOSPHATE 600 MG/50ML
600 INJECTION INTRAVENOUS ONCE
Status: COMPLETED | OUTPATIENT
Start: 2020-12-14 | End: 2020-12-14

## 2020-12-14 RX ORDER — ONDANSETRON 2 MG/ML
4 INJECTION INTRAMUSCULAR; INTRAVENOUS ONCE
Status: COMPLETED | OUTPATIENT
Start: 2020-12-14 | End: 2020-12-14

## 2020-12-14 RX ADMIN — SODIUM CHLORIDE 1000 ML: 9 INJECTION, SOLUTION INTRAVENOUS at 02:29

## 2020-12-14 RX ADMIN — CLINDAMYCIN IN 5 PERCENT DEXTROSE 600 MG: 12 INJECTION, SOLUTION INTRAVENOUS at 02:32

## 2020-12-14 RX ADMIN — IOPAMIDOL 90 ML: 612 INJECTION, SOLUTION INTRAVENOUS at 03:24

## 2020-12-14 RX ADMIN — ONDANSETRON 4 MG: 2 INJECTION INTRAMUSCULAR; INTRAVENOUS at 02:29

## 2020-12-14 RX ADMIN — MORPHINE SULFATE 4 MG: 4 INJECTION INTRAVENOUS at 02:29

## 2020-12-14 NOTE — ED PROVIDER NOTES
Subjective   25-year-old white female presents to the emergency department with chief complaint of facial swelling.  Patient complains of left mandibular swelling since yesterday morning.  Associated symptoms include dental pain and chills.          Review of Systems   Constitutional: Positive for chills. Negative for diaphoresis and fever.   HENT: Positive for dental problem.    Respiratory: Negative for cough and shortness of breath.    Cardiovascular: Negative for chest pain.   Gastrointestinal: Negative for abdominal pain, nausea and vomiting.   Genitourinary: Negative for dysuria.   Musculoskeletal: Negative for back pain, gait problem, neck pain and neck stiffness.   Neurological: Positive for headaches. Negative for syncope and weakness.   All other systems reviewed and are negative.      Past Medical History:   Diagnosis Date   • Abdominal pain    • Acute bronchitis    • Bipolar 1 disorder (CMS/HCC)    • Corneal abrasion    • Cyst of left ovary    • Depression    • Dietary counseling and surveillance    • Encounter for  visit    • General counseling and advice on female contraception     would like a gary   • Gestational diabetes mellitus     only during pregnancy   • Hip dysplasia, congenital     bilateral   • Manic depression (CMS/HCC)    • Multiple personalities (CMS/HCC)    • Nausea    • Pregnancy test positive     serum   • Tension type headache    • Viral gastroenteritis        Allergies   Allergen Reactions   • Strawberry Hives   • Sulfa Antibiotics Hives       Past Surgical History:   Procedure Laterality Date   •  SECTION     • EAR TUBES     • HIP SURGERY Left    • OK HYSTEROSCOPY,W/ENDOMETRIAL ABLATION N/A 2017    Procedure: HYSTEROSCOPY WITH ENDOMETRIAL ABLATION;  Surgeon: Dominik Toribio MD;  Location: [facility];  Service: Obstetrics/Gynecology   • TONSILLECTOMY      T & A   • TOTAL LAPAROSCOPIC HYSTERECTOMY SALPINGO OOPHORECTOMY Bilateral 2020     "Procedure: TOTAL LAPAROSCOPIC HYSTERECTOMY BILATERAL salpingectomy, cystoscopy, Left Oophorectomy;  Surgeon: Ricardo Orellana DO;  Location: [facility];  Service: Obstetrics/Gynecology;  Laterality: Bilateral;   • TUBAL COAGULATION LAPAROSCOPIC Bilateral 12/20/2017    Procedure: EXAMINATION UNDER ANESTHESA, LAPAROSCOPIC TUBAL FULGURATION, DIAGNOISTIC HYSTEROSCOPY WITH FRACTIONAL DILATION AND CURETTAGE ENDOMETRAL ABLATION ;  Surgeon: Dominik Toribio MD;  Location: [facility];  Service:        Family History   Problem Relation Age of Onset   • Other Father         carbon monoxide poisoning   • Cancer Other    • Diabetes Other    • Hypertension Other    • Stroke Other    • Cataracts Maternal Grandmother    • Cataracts Maternal Grandfather    • Cataracts Paternal Grandmother    • Cataracts Paternal Grandfather        Social History     Socioeconomic History   • Marital status:      Spouse name: Not on file   • Number of children: Not on file   • Years of education: Not on file   • Highest education level: Not on file   Tobacco Use   • Smoking status: Current Every Day Smoker     Packs/day: 0.25     Years: 12.00     Pack years: 3.00     Types: Cigarettes   • Smokeless tobacco: Never Used   Substance and Sexual Activity   • Alcohol use: No   • Drug use: Not Currently     Types: Marijuana     Comment: STATED \"NONE SINCE AUGUST\"   • Sexual activity: Yes     Partners: Male     Birth control/protection: None, Surgical           Objective   Physical Exam  Vitals signs and nursing note reviewed.   Constitutional:       General: She is not in acute distress.     Appearance: She is not diaphoretic.   HENT:      Head:      Comments: Left submandibular swelling.  No trismus.     Right Ear: External ear normal.      Left Ear: External ear normal.      Nose: Nose normal.      Mouth/Throat:      Mouth: Mucous membranes are moist.      Pharynx: Oropharynx is clear.      Comments: Poor dentition with extensive severe " dental caries.  Eyes:      Extraocular Movements: Extraocular movements intact.      Conjunctiva/sclera: Conjunctivae normal.      Pupils: Pupils are equal, round, and reactive to light.   Neck:      Musculoskeletal: Normal range of motion and neck supple.   Cardiovascular:      Rate and Rhythm: Normal rate and regular rhythm.      Pulses: Normal pulses.      Heart sounds: Normal heart sounds.   Pulmonary:      Effort: Pulmonary effort is normal.      Breath sounds: Normal breath sounds.   Abdominal:      General: Bowel sounds are normal. There is no distension.      Palpations: Abdomen is soft. There is no mass.      Tenderness: There is no abdominal tenderness. There is no guarding.   Musculoskeletal: Normal range of motion.      Right lower leg: No edema.      Left lower leg: No edema.   Skin:     General: Skin is warm and dry.   Neurological:      General: No focal deficit present.      Mental Status: She is alert and oriented to person, place, and time.   Psychiatric:         Mood and Affect: Mood normal.         Behavior: Behavior normal.         Procedures           ED Course  ED Course as of Dec 14 0403   Mon Dec 14, 2020   0401 Patient is alert and resting comfortably. I reviewed the results of the emergency department evaluation with the patient.  I recommended primary care and dentist follow-up.  I advised the patient to return to the emergency department if their symptoms change or worsen.     [DR]      ED Course User Index  [DR] John Quinonez MD      Labs Reviewed   COMPREHENSIVE METABOLIC PANEL - Abnormal; Notable for the following components:       Result Value    Glucose 119 (*)     Sodium 134 (*)     All other components within normal limits    Narrative:     GFR Normal >60  Chronic Kidney Disease <60  Kidney Failure <15     CBC WITH AUTO DIFFERENTIAL - Abnormal; Notable for the following components:    WBC 13.86 (*)     Neutrophil % 78.6 (*)     Lymphocyte % 12.8 (*)     Neutrophils, Absolute 10.90  (*)     All other components within normal limits   CBC AND DIFFERENTIAL    Narrative:     The following orders were created for panel order CBC & Differential.  Procedure                               Abnormality         Status                     ---------                               -----------         ------                     CBC Auto Differential[[id number]]        Abnormal            Final result                 Please view results for these tests on the individual orders.     Ct Soft Tissue Neck With Contrast    Result Date: 12/14/2020  Narrative: CT SCAN NECK HISTORY: left submandibular swelling, rule-out Tani's Angina COMPARISON: None. TECHNIQUE: Radiation dose reduction techniques were utilized, including automated exposure control and exposure modulation based on body size. Axial CT imaging of the neck was performed. Multi projection 3-D reformatted images supplemented and reviewed. Exam performed with IV contrast. FINDINGS: Mastoids are well aerated. The imaged portion of the paranasal sinuses show a tiny polyp versus retention cyst in the right sphenoid but are otherwise unremarkable. No oral cavity mass lesion is demonstrated. The salivary glands are unremarkable. There is some soft tissue swelling and nonspecific inflammation in the subcutaneous fat and deep soft tissues adjacent to the body of the left mandible. Suspect cellulitis. No abscess identified. There is mild submandibular adenopathy measuring 2.1 cm on axial image 39. There is mild left jugulodigastric adenopathy also measuring 1.9 cm on image 31. Favor reactive etiology. Epiglottis and piriform sinuses are unremarkable. No true or false vocal cord lesion. Thyroid gland is homogeneous. Visualized pulmonary parenchyma and upper mediastinum are clear. There is a bovine aortic arch. Vascular structures enhance normally. Bone window images demonstrate no acute osseous abnormality. There are several missing and broken teeth. There is some  periapical lucency about tooth 21 likely related to dental pathology.     Impression: 1. Nonspecific left perimandibular inflammation likely related to cellulitis. Mild left level 1 and 2 adenopathy is favored to be reactive in nature. Follow-up recommended to ensure resolution. 2. Tiny polyp versus retention cyst in the right sphenoid Electronically signed by:  Stefan Hooker MD  12/14/2020 3:56 AM CST Workstation: [id number]    Xr Chest 1 View    Result Date: 11/30/2020  Narrative: EXAM DESCRIPTION:  XR CHEST 1 VW CLINICAL HISTORY: 25 years  Female  cvl placement in the right IJ, Z98.890 Other specified postprocedural states N94.10 Unspecified dyspareunia R10.2 Pelvic and perineal pain N93.9 Abnormal uterine and vaginal bleeding, unspecified N92.0 Excessive and frequent menstruation with regular cycle COMPARISON: November 30, 2020-7:46 AM TECHNIQUE: One view-AP portable radiograph the chest FINDINGS: No interval changes noted. The lungs remain well-expanded and clear. An internal jugular deep line is noted on the right with the distal tip at the cavoatrial junction. There is no measurable pneumothorax. Positioning is stable. There are no pleural effusions.     Impression: 1. Stable appearance of the chest without radiographic evidence of acute cardiopulmonary disease. 2. Central venous catheter via the right internal jugular vein with the distal tip at the cavoatrial junction redemonstrated. Electronically signed by:  Radha Dwyer MD  11/30/2020 10:49 AM CST Workstation: [id number]    Xr Chest Post Cva Port    Result Date: 11/30/2020  Narrative: Chest x-ray single view. CLINICAL INDICATION: Central venous line placement. COMPARISON: None FINDINGS: Cardiac silhouette is normal in size. Pulmonary vascularity is unremarkable. No focal infiltrate or consolidation.  No pleural effusion.  No pneumothorax.     Impression: Central venous catheter right jugular technique with catheter tip in the superior vena cava in  satisfactory position. Electronically signed by:  Lowell Jay MD  11/30/2020 8:11 AM [facility] Workstation: [id number]                                       [facility]    Final diagnoses:   Cellulitis of face            John Quinonez MD  12/14/20 0407

## 2020-12-15 ENCOUNTER — APPOINTMENT (OUTPATIENT)
Dept: CT IMAGING | Facility: HOSPITAL | Age: 25
End: 2020-12-15

## 2020-12-15 ENCOUNTER — HOSPITAL ENCOUNTER (EMERGENCY)
Facility: HOSPITAL | Age: 25
Discharge: HOME | End: 2020-12-15
Attending: EMERGENCY MEDICINE | Admitting: EMERGENCY MEDICINE

## 2020-12-15 VITALS
HEART RATE: 76 BPM | SYSTOLIC BLOOD PRESSURE: 125 MMHG | RESPIRATION RATE: 18 BRPM | WEIGHT: 198 LBS | OXYGEN SATURATION: 100 % | BODY MASS INDEX: 31.08 KG/M2 | TEMPERATURE: 98.2 F | DIASTOLIC BLOOD PRESSURE: 58 MMHG | HEIGHT: 67 IN

## 2020-12-15 DIAGNOSIS — N83.201 RIGHT OVARIAN CYST: Primary | ICD-10-CM

## 2020-12-15 LAB
ALBUMIN SERPL-MCNC: 4.2 G/DL (ref 3.5–5.2)
ALBUMIN/GLOB SERPL: 1.2 G/DL
ALP SERPL-CCNC: 86 U/L (ref 39–117)
ALT SERPL W P-5'-P-CCNC: 14 U/L (ref 1–33)
ANION GAP SERPL CALCULATED.3IONS-SCNC: 9 MMOL/L (ref 5–15)
AST SERPL-CCNC: 20 U/L (ref 1–32)
BACTERIA UR QL AUTO: ABNORMAL /HPF
BASOPHILS # BLD AUTO: 0.04 10*3/MM3 (ref 0–0.2)
BASOPHILS NFR BLD AUTO: 0.4 % (ref 0–1.5)
BILIRUB SERPL-MCNC: <0.2 MG/DL (ref 0–1.2)
BILIRUB UR QL STRIP: NEGATIVE
BUN SERPL-MCNC: 13 MG/DL (ref 6–20)
BUN/CREAT SERPL: 17.6 (ref 7–25)
CALCIUM SPEC-SCNC: 9.6 MG/DL (ref 8.6–10.5)
CHLORIDE SERPL-SCNC: 105 MMOL/L (ref 98–107)
CLARITY UR: CLEAR
CO2 SERPL-SCNC: 25 MMOL/L (ref 22–29)
COLOR UR: YELLOW
CREAT SERPL-MCNC: 0.74 MG/DL (ref 0.57–1)
DEPRECATED RDW RBC AUTO: 38.1 FL (ref 37–54)
EOSINOPHIL # BLD AUTO: 0.47 10*3/MM3 (ref 0–0.4)
EOSINOPHIL NFR BLD AUTO: 4.1 % (ref 0.3–6.2)
ERYTHROCYTE [DISTWIDTH] IN BLOOD BY AUTOMATED COUNT: 12.4 % (ref 12.3–15.4)
GFR SERPL CREATININE-BSD FRML MDRD: 95 ML/MIN/1.73
GLOBULIN UR ELPH-MCNC: 3.6 GM/DL
GLUCOSE SERPL-MCNC: 92 MG/DL (ref 65–99)
GLUCOSE UR STRIP-MCNC: NEGATIVE MG/DL
HCT VFR BLD AUTO: 41.9 % (ref 34–46.6)
HGB BLD-MCNC: 13.5 G/DL (ref 12–15.9)
HGB UR QL STRIP.AUTO: NEGATIVE
HOLD SPECIMEN: NORMAL
HYALINE CASTS UR QL AUTO: ABNORMAL /LPF
IMM GRANULOCYTES # BLD AUTO: 0.02 10*3/MM3 (ref 0–0.05)
IMM GRANULOCYTES NFR BLD AUTO: 0.2 % (ref 0–0.5)
KETONES UR QL STRIP: NEGATIVE
LEUKOCYTE ESTERASE UR QL STRIP.AUTO: ABNORMAL
LIPASE SERPL-CCNC: 29 U/L (ref 13–60)
LYMPHOCYTES # BLD AUTO: 2.9 10*3/MM3 (ref 0.7–3.1)
LYMPHOCYTES NFR BLD AUTO: 25.6 % (ref 19.6–45.3)
MCH RBC QN AUTO: 27.4 PG (ref 26.6–33)
MCHC RBC AUTO-ENTMCNC: 32.2 G/DL (ref 31.5–35.7)
MCV RBC AUTO: 85.2 FL (ref 79–97)
MONOCYTES # BLD AUTO: 0.69 10*3/MM3 (ref 0.1–0.9)
MONOCYTES NFR BLD AUTO: 6.1 % (ref 5–12)
NEUTROPHILS NFR BLD AUTO: 63.6 % (ref 42.7–76)
NEUTROPHILS NFR BLD AUTO: 7.23 10*3/MM3 (ref 1.7–7)
NITRITE UR QL STRIP: NEGATIVE
NRBC BLD AUTO-RTO: 0 /100 WBC (ref 0–0.2)
PH UR STRIP.AUTO: 6 [PH] (ref 5–9)
PLATELET # BLD AUTO: 305 10*3/MM3 (ref 140–450)
PMV BLD AUTO: 9.9 FL (ref 6–12)
POTASSIUM SERPL-SCNC: 3.9 MMOL/L (ref 3.5–5.2)
PROT SERPL-MCNC: 7.8 G/DL (ref 6–8.5)
PROT UR QL STRIP: NEGATIVE
RBC # BLD AUTO: 4.92 10*6/MM3 (ref 3.77–5.28)
RBC # UR: ABNORMAL /HPF
REF LAB TEST METHOD: ABNORMAL
SODIUM SERPL-SCNC: 139 MMOL/L (ref 136–145)
SP GR UR STRIP: 1.03 (ref 1–1.03)
SQUAMOUS #/AREA URNS HPF: ABNORMAL /HPF
UROBILINOGEN UR QL STRIP: ABNORMAL
WBC # BLD AUTO: 11.35 10*3/MM3 (ref 3.4–10.8)
WBC UR QL AUTO: ABNORMAL /HPF

## 2020-12-15 PROCEDURE — 83690 ASSAY OF LIPASE: CPT | Performed by: EMERGENCY MEDICINE

## 2020-12-15 PROCEDURE — 85025 COMPLETE CBC W/AUTO DIFF WBC: CPT | Performed by: EMERGENCY MEDICINE

## 2020-12-15 PROCEDURE — 25010000002 IOPAMIDOL 61 % SOLUTION: Performed by: EMERGENCY MEDICINE

## 2020-12-15 PROCEDURE — 99283 EMERGENCY DEPT VISIT LOW MDM: CPT

## 2020-12-15 PROCEDURE — 74177 CT ABD & PELVIS W/CONTRAST: CPT

## 2020-12-15 PROCEDURE — 81001 URINALYSIS AUTO W/SCOPE: CPT | Performed by: EMERGENCY MEDICINE

## 2020-12-15 PROCEDURE — 80053 COMPREHEN METABOLIC PANEL: CPT | Performed by: EMERGENCY MEDICINE

## 2020-12-15 RX ORDER — SODIUM CHLORIDE 0.9 % (FLUSH) 0.9 %
10 SYRINGE (ML) INJECTION AS NEEDED
Status: DISCONTINUED | OUTPATIENT
Start: 2020-12-15 | End: 2020-12-16 | Stop reason: HOSPADM

## 2020-12-15 RX ADMIN — IOPAMIDOL 90 ML: 612 INJECTION, SOLUTION INTRAVENOUS at 22:36

## 2020-12-15 RX ADMIN — SODIUM CHLORIDE 1000 ML: 9 INJECTION, SOLUTION INTRAVENOUS at 22:14

## 2020-12-16 NOTE — ED PROVIDER NOTES
"Subjective   25-year-old white female presents to the emergency department chief complaint of post-op problem.  Patient is status post laparoscopic hysterectomy on  by Dr. Orellana.  Patient complains of right lower quadrant pain and tenderness since this morning.  She denies fever, sweats, chills, nausea, vomiting, dysuria, hematuria, vaginal bleeding, or vaginal discharge.  Patient was seen here early yesterday morning with left mandibular cellulitis related to dental infection.  She was prescribed clindamycin and advised to follow-up with her primary care physician and dentist.  Patient relates the symptoms have improved and states \"it feels a lot better.\"          Review of Systems   Constitutional: Negative for chills, diaphoresis and fever.   HENT: Positive for dental problem and facial swelling.    Respiratory: Negative for cough and shortness of breath.    Cardiovascular: Negative for chest pain.   Gastrointestinal: Positive for abdominal pain. Negative for blood in stool, diarrhea and vomiting.   Genitourinary: Negative for dysuria, hematuria, vaginal bleeding and vaginal discharge.   Musculoskeletal: Negative for back pain, gait problem and neck pain.   Neurological: Negative for syncope, weakness and headaches.   All other systems reviewed and are negative.      Past Medical History:   Diagnosis Date   • Abdominal pain    • Acute bronchitis    • Bipolar 1 disorder (CMS/HCC)    • Corneal abrasion    • Cyst of left ovary    • Depression    • Dietary counseling and surveillance    • Encounter for  visit    • General counseling and advice on female contraception     would like a gary   • Gestational diabetes mellitus     only during pregnancy   • Hip dysplasia, congenital     bilateral   • Manic depression (CMS/HCC)    • Multiple personalities (CMS/HCC)    • Nausea    • Pregnancy test positive     serum   • Tension type headache    • Viral gastroenteritis        Allergies   Allergen " "Reactions   • Strawberry Hives   • Sulfa Antibiotics Hives       Past Surgical History:   Procedure Laterality Date   •  SECTION     • EAR TUBES     • HIP SURGERY Left    • HI HYSTEROSCOPY,W/ENDOMETRIAL ABLATION N/A 2017    Procedure: HYSTEROSCOPY WITH ENDOMETRIAL ABLATION;  Surgeon: Dominik Toribio MD;  Location: [facility];  Service: Obstetrics/Gynecology   • TONSILLECTOMY      T & A   • TOTAL LAPAROSCOPIC HYSTERECTOMY SALPINGO OOPHORECTOMY Bilateral 2020    Procedure: TOTAL LAPAROSCOPIC HYSTERECTOMY BILATERAL salpingectomy, cystoscopy, Left Oophorectomy;  Surgeon: Ricardo Orellana DO;  Location: [facility];  Service: Obstetrics/Gynecology;  Laterality: Bilateral;   • TUBAL COAGULATION LAPAROSCOPIC Bilateral 2017    Procedure: EXAMINATION UNDER ANESTHESA, LAPAROSCOPIC TUBAL FULGURATION, DIAGNOISTIC HYSTEROSCOPY WITH FRACTIONAL DILATION AND CURETTAGE ENDOMETRAL ABLATION ;  Surgeon: Dominik Toribio MD;  Location: [facility];  Service:        Family History   Problem Relation Age of Onset   • Other Father         carbon monoxide poisoning   • Cancer Other    • Diabetes Other    • Hypertension Other    • Stroke Other    • Cataracts Maternal Grandmother    • Cataracts Maternal Grandfather    • Cataracts Paternal Grandmother    • Cataracts Paternal Grandfather        Social History     Socioeconomic History   • Marital status:      Spouse name: Not on file   • Number of children: Not on file   • Years of education: Not on file   • Highest education level: Not on file   Tobacco Use   • Smoking status: Current Every Day Smoker     Packs/day: 0.25     Years: 12.00     Pack years: 3.00     Types: Cigarettes   • Smokeless tobacco: Never Used   Substance and Sexual Activity   • Alcohol use: No   • Drug use: Not Currently     Types: Marijuana     Comment: STATED \"NONE SINCE AUGUST\"   • Sexual activity: Yes     Partners: Male     Birth control/protection: None, Surgical "           Objective   Physical Exam  Vitals signs and nursing note reviewed.   Constitutional:       General: She is not in acute distress.     Appearance: She is not toxic-appearing or diaphoretic.   HENT:      Head: Normocephalic and atraumatic.      Comments: Left mandibular swelling has improved since yesterday.  No trismus.     Right Ear: External ear normal.      Left Ear: External ear normal.      Nose: Nose normal.      Mouth/Throat:      Mouth: Mucous membranes are moist.      Pharynx: Oropharynx is clear.      Comments: Poor dentition with severe dental caries.  Eyes:      Extraocular Movements: Extraocular movements intact.      Conjunctiva/sclera: Conjunctivae normal.      Pupils: Pupils are equal, round, and reactive to light.   Neck:      Musculoskeletal: Normal range of motion and neck supple.   Cardiovascular:      Rate and Rhythm: Normal rate and regular rhythm.      Pulses: Normal pulses.      Heart sounds: Normal heart sounds.   Pulmonary:      Effort: Pulmonary effort is normal.      Breath sounds: Normal breath sounds.   Abdominal:      General: Bowel sounds are normal. There is no distension.      Palpations: Abdomen is soft.      Tenderness: There is abdominal tenderness in the right lower quadrant. There is no guarding or rebound.   Musculoskeletal: Normal range of motion.      Right lower leg: No edema.      Left lower leg: No edema.   Skin:     General: Skin is warm and dry.      Findings: No rash.   Neurological:      General: No focal deficit present.      Mental Status: She is alert and oriented to person, place, and time.   Psychiatric:         Mood and Affect: Mood normal.         Behavior: Behavior normal.         Procedures           ED Course  ED Course as of Dec 15 2253   Tue Dec 15, 2020   2252 Patient is alert and resting comfortably no acute distress.  I reviewed the results of her evaluation with her.  I recommended she follow-up with her gynecologist Dr. Orellana.  I advised  her to return to the emergency department if she develops a fever or if her symptoms change or worsen.    [DR]      ED Course User Index  [] John Quinonez MD      Labs Reviewed   URINALYSIS W/ MICROSCOPIC IF INDICATED (NO CULTURE) - Abnormal; Notable for the following components:       Result Value    Leuk Esterase, UA Small (1+) (*)     All other components within normal limits   CBC WITH AUTO DIFFERENTIAL - Abnormal; Notable for the following components:    WBC 11.35 (*)     Neutrophils, Absolute 7.23 (*)     Eosinophils, Absolute 0.47 (*)     All other components within normal limits   URINALYSIS, MICROSCOPIC ONLY - Abnormal; Notable for the following components:    RBC, UA 6-12 (*)     Squamous Epithelial Cells, UA 3-5 (*)     All other components within normal limits   LIPASE - Normal   COMPREHENSIVE METABOLIC PANEL    Narrative:     GFR Normal >60  Chronic Kidney Disease <60  Kidney Failure <15     CBC AND DIFFERENTIAL    Narrative:     The following orders were created for panel order CBC & Differential.  Procedure                               Abnormality         Status                     ---------                               -----------         ------                     CBC Auto Differential[[id number]]        Abnormal            Final result                 Please view results for these tests on the individual orders.   EXTRA TUBES    Narrative:     The following orders were created for panel order Extra Tubes.  Procedure                               Abnormality         Status                     ---------                               -----------         ------                     Light Blue Top[[id number]]                                                              Gold Top - SST[[id number]]                                   In process                   Please view results for these tests on the individual orders.   LIGHT BLUE TOP   GOLD TOP - SST     Ct Soft Tissue Neck With Contrast    Result  Date: 12/14/2020  Narrative: CT SCAN NECK HISTORY: left submandibular swelling, rule-out Tani's Angina COMPARISON: None. TECHNIQUE: Radiation dose reduction techniques were utilized, including automated exposure control and exposure modulation based on body size. Axial CT imaging of the neck was performed. Multi projection 3-D reformatted images supplemented and reviewed. Exam performed with IV contrast. FINDINGS: Mastoids are well aerated. The imaged portion of the paranasal sinuses show a tiny polyp versus retention cyst in the right sphenoid but are otherwise unremarkable. No oral cavity mass lesion is demonstrated. The salivary glands are unremarkable. There is some soft tissue swelling and nonspecific inflammation in the subcutaneous fat and deep soft tissues adjacent to the body of the left mandible. Suspect cellulitis. No abscess identified. There is mild submandibular adenopathy measuring 2.1 cm on axial image 39. There is mild left jugulodigastric adenopathy also measuring 1.9 cm on image 31. Favor reactive etiology. Epiglottis and piriform sinuses are unremarkable. No true or false vocal cord lesion. Thyroid gland is homogeneous. Visualized pulmonary parenchyma and upper mediastinum are clear. There is a bovine aortic arch. Vascular structures enhance normally. Bone window images demonstrate no acute osseous abnormality. There are several missing and broken teeth. There is some periapical lucency about tooth 21 likely related to dental pathology.     Impression: 1. Nonspecific left perimandibular inflammation likely related to cellulitis. Mild left level 1 and 2 adenopathy is favored to be reactive in nature. Follow-up recommended to ensure resolution. 2. Tiny polyp versus retention cyst in the right sphenoid Electronically signed by:  Stefan Hooker MD  12/14/2020 3:56 AM CST Workstation: [id number]    Ct Abdomen Pelvis With Contrast    Result Date: 12/15/2020  Narrative: EXAM DESCRIPTION: CT ABDOMEN  PELVIS W CONTRAST RadLex: CT ABDOMEN PELVIS WITH IV CONTRAST CLINICAL HISTORY: 25 years  Female;  RLQ pain TECHNOLOGIST NOTES: TECHNIQUE: Helical CT imaging was obtained of the abdomen and pelvis with multiplanar reconstructions. This exam was performed according to our departmental dose-optimization program, which includes automated exposure control, adjustment of the mA and/or kV according to patient size and/or use of iterative reconstruction techniques. COMPARISON: None currently available FINDINGS: Abdomen: The visualized lung bases are unremarkable. There is no focal consolidation. The liver, spleen, pancreas, adrenal glands and kidneys show no acute abnormality. No evidence of acute pancreatitis. Gallbladder is severely contracted.    There are no calcified gallstones. There is no gallbladder wall thickening. No pericholecystic fluid.  There is no gross biliary duct dilatation.  No significant hydronephrosis bilaterally.   No free air.    There is no significant free fluid.    There is no significant lymph node enlargement. There is no significant aneurysmal enlargement of the abdominal aorta. Pelvis: There is no evidence of bowel obstruction.    No herniated bowel loops.  . No focal inflammatory changes . The appendix is unremarkable.  Evaluation of the bowel is limited when there is no oral contrast or when the bowel is incompletely opacified with enteric contrast.. There is no significant free fluid in the pelvis. The bladder is grossly unremarkable.  Uterus is absent. There is a 4.6 x 4 cm right ovarian cyst. There is plate and screw fixation of the left femoral diaphysis..     Impression: 1.  No evidence of an acute process. 2.  No bowel obstruction, herniated bowel loops or focal inflammatory changes. Electronically signed by:  Jasen Chappell MD  12/15/2020 10:46 PM CST Workstation: [id number]    Xr Chest 1 View    Result Date: 11/30/2020  Narrative: EXAM DESCRIPTION:  XR CHEST 1 VW CLINICAL HISTORY: 25 years   Female  cvl placement in the right IJ, Z98.890 Other specified postprocedural states N94.10 Unspecified dyspareunia R10.2 Pelvic and perineal pain N93.9 Abnormal uterine and vaginal bleeding, unspecified N92.0 Excessive and frequent menstruation with regular cycle COMPARISON: November 30, 2020-7:46 AM TECHNIQUE: One view-AP portable radiograph the chest FINDINGS: No interval changes noted. The lungs remain well-expanded and clear. An internal jugular deep line is noted on the right with the distal tip at the cavoatrial junction. There is no measurable pneumothorax. Positioning is stable. There are no pleural effusions.     Impression: 1. Stable appearance of the chest without radiographic evidence of acute cardiopulmonary disease. 2. Central venous catheter via the right internal jugular vein with the distal tip at the cavoatrial junction redemonstrated. Electronically signed by:  Radha Dwyer MD  11/30/2020 10:49 AM CST Workstation: [id number]    Xr Chest Post Cva Port    Result Date: 11/30/2020  Narrative: Chest x-ray single view. CLINICAL INDICATION: Central venous line placement. COMPARISON: None FINDINGS: Cardiac silhouette is normal in size. Pulmonary vascularity is unremarkable. No focal infiltrate or consolidation.  No pleural effusion.  No pneumothorax.     Impression: Central venous catheter right jugular technique with catheter tip in the superior vena cava in satisfactory position. Electronically signed by:  Lowell Jay MD  11/30/2020 8:11 AM CST Workstation: [id number]                                       [facility]    Final diagnoses:   Right ovarian cyst            John Quinonez MD  12/15/20 [id number]

## 2021-02-04 NOTE — ANESTHESIA PROCEDURE NOTES
Airway  Urgency: elective    Date/Time: 11/30/2020 8:37 AM  Airway not difficult    General Information and Staff    Patient location during procedure: OR    Indications and Patient Condition  Indications for airway management: airway protection and CNS depression    Preoxygenated: yes  Mask difficulty assessment: 1 - vent by mask    Final Airway Details  Final airway type: endotracheal airway      Successful airway: ETT  Cuffed: yes   Successful intubation technique: video laryngoscopy  Facilitating devices/methods: intubating stylet and anterior pressure/BURP  Endotracheal tube insertion site: oral  Blade: Vanessa  Blade size: 3  ETT size (mm): 7.0  Cormack-Lehane Classification: grade IIa - partial view of glottis  Placement verified by: chest auscultation and capnometry   Measured from: gums  Number of attempts at approach: 2  Assessment: lips, teeth, and gum same as pre-op and atraumatic intubation    Additional Comments  LTA kit             152.4

## 2021-08-02 PROCEDURE — 87635 SARS-COV-2 COVID-19 AMP PRB: CPT | Performed by: NURSE PRACTITIONER

## 2022-09-28 ENCOUNTER — HOSPITAL ENCOUNTER (EMERGENCY)
Facility: HOSPITAL | Age: 27
Discharge: HOME | End: 2022-09-28
Attending: EMERGENCY MEDICINE | Admitting: EMERGENCY MEDICINE

## 2022-09-28 ENCOUNTER — APPOINTMENT (OUTPATIENT)
Dept: CT IMAGING | Facility: HOSPITAL | Age: 27
End: 2022-09-28

## 2022-09-28 VITALS
HEART RATE: 76 BPM | DIASTOLIC BLOOD PRESSURE: 81 MMHG | TEMPERATURE: 98.6 F | RESPIRATION RATE: 18 BRPM | HEIGHT: 67 IN | WEIGHT: 160 LBS | SYSTOLIC BLOOD PRESSURE: 139 MMHG | BODY MASS INDEX: 25.11 KG/M2 | OXYGEN SATURATION: 95 %

## 2022-09-28 DIAGNOSIS — R10.84 GENERALIZED ABDOMINAL PAIN: ICD-10-CM

## 2022-09-28 DIAGNOSIS — K92.1 BLOODY STOOL: Primary | ICD-10-CM

## 2022-09-28 LAB
ALBUMIN SERPL-MCNC: 4.4 G/DL (ref 3.5–5.2)
ALBUMIN/GLOB SERPL: 1.6 G/DL
ALP SERPL-CCNC: 94 U/L (ref 39–117)
ALT SERPL W P-5'-P-CCNC: 27 U/L (ref 1–33)
ANION GAP SERPL CALCULATED.3IONS-SCNC: 7 MMOL/L (ref 5–15)
AST SERPL-CCNC: 20 U/L (ref 1–32)
BASOPHILS # BLD AUTO: 0.04 10*3/MM3 (ref 0–0.2)
BASOPHILS NFR BLD AUTO: 0.7 % (ref 0–1.5)
BILIRUB SERPL-MCNC: 0.2 MG/DL (ref 0–1.2)
BILIRUB UR QL STRIP: NEGATIVE
BUN SERPL-MCNC: 10 MG/DL (ref 6–20)
BUN/CREAT SERPL: 12 (ref 7–25)
CALCIUM SPEC-SCNC: 9.2 MG/DL (ref 8.6–10.5)
CHLORIDE SERPL-SCNC: 100 MMOL/L (ref 98–107)
CLARITY UR: ABNORMAL
CO2 SERPL-SCNC: 32 MMOL/L (ref 22–29)
COLOR UR: YELLOW
CREAT SERPL-MCNC: 0.83 MG/DL (ref 0.57–1)
DEPRECATED RDW RBC AUTO: 40.2 FL (ref 37–54)
EGFRCR SERPLBLD CKD-EPI 2021: 98.6 ML/MIN/1.73
EOSINOPHIL # BLD AUTO: 0.09 10*3/MM3 (ref 0–0.4)
EOSINOPHIL NFR BLD AUTO: 1.5 % (ref 0.3–6.2)
ERYTHROCYTE [DISTWIDTH] IN BLOOD BY AUTOMATED COUNT: 12.9 % (ref 12.3–15.4)
GLOBULIN UR ELPH-MCNC: 2.8 GM/DL
GLUCOSE SERPL-MCNC: 89 MG/DL (ref 65–99)
GLUCOSE UR STRIP-MCNC: NEGATIVE MG/DL
HCT VFR BLD AUTO: 40.7 % (ref 34–46.6)
HGB BLD-MCNC: 12.9 G/DL (ref 12–15.9)
HGB UR QL STRIP.AUTO: NEGATIVE
HOLD SPECIMEN: NORMAL
HOLD SPECIMEN: NORMAL
IMM GRANULOCYTES # BLD AUTO: 0.02 10*3/MM3 (ref 0–0.05)
IMM GRANULOCYTES NFR BLD AUTO: 0.3 % (ref 0–0.5)
KETONES UR QL STRIP: NEGATIVE
LEUKOCYTE ESTERASE UR QL STRIP.AUTO: NEGATIVE
LIPASE SERPL-CCNC: 26 U/L (ref 13–60)
LYMPHOCYTES # BLD AUTO: 2.33 10*3/MM3 (ref 0.7–3.1)
LYMPHOCYTES NFR BLD AUTO: 39 % (ref 19.6–45.3)
MCH RBC QN AUTO: 27 PG (ref 26.6–33)
MCHC RBC AUTO-ENTMCNC: 31.7 G/DL (ref 31.5–35.7)
MCV RBC AUTO: 85.1 FL (ref 79–97)
MONOCYTES # BLD AUTO: 0.41 10*3/MM3 (ref 0.1–0.9)
MONOCYTES NFR BLD AUTO: 6.9 % (ref 5–12)
NEUTROPHILS NFR BLD AUTO: 3.09 10*3/MM3 (ref 1.7–7)
NEUTROPHILS NFR BLD AUTO: 51.6 % (ref 42.7–76)
NITRITE UR QL STRIP: NEGATIVE
NRBC BLD AUTO-RTO: 0 /100 WBC (ref 0–0.2)
PH UR STRIP.AUTO: 8 [PH] (ref 5–9)
PLATELET # BLD AUTO: 132 10*3/MM3 (ref 140–450)
PMV BLD AUTO: 10.6 FL (ref 6–12)
POTASSIUM SERPL-SCNC: 3.9 MMOL/L (ref 3.5–5.2)
PROT SERPL-MCNC: 7.2 G/DL (ref 6–8.5)
PROT UR QL STRIP: NEGATIVE
RBC # BLD AUTO: 4.78 10*6/MM3 (ref 3.77–5.28)
RBC MORPH BLD: NORMAL
SMALL PLATELETS BLD QL SMEAR: NORMAL
SODIUM SERPL-SCNC: 139 MMOL/L (ref 136–145)
SP GR UR STRIP: 1.02 (ref 1–1.03)
UROBILINOGEN UR QL STRIP: ABNORMAL
WBC MORPH BLD: NORMAL
WBC NRBC COR # BLD: 5.98 10*3/MM3 (ref 3.4–10.8)
WHOLE BLOOD HOLD COAG: NORMAL
WHOLE BLOOD HOLD SPECIMEN: NORMAL

## 2022-09-28 PROCEDURE — 85007 BL SMEAR W/DIFF WBC COUNT: CPT | Performed by: EMERGENCY MEDICINE

## 2022-09-28 PROCEDURE — 80053 COMPREHEN METABOLIC PANEL: CPT | Performed by: EMERGENCY MEDICINE

## 2022-09-28 PROCEDURE — 83690 ASSAY OF LIPASE: CPT | Performed by: EMERGENCY MEDICINE

## 2022-09-28 PROCEDURE — 74177 CT ABD & PELVIS W/CONTRAST: CPT

## 2022-09-28 PROCEDURE — 96374 THER/PROPH/DIAG INJ IV PUSH: CPT

## 2022-09-28 PROCEDURE — 85025 COMPLETE CBC W/AUTO DIFF WBC: CPT | Performed by: EMERGENCY MEDICINE

## 2022-09-28 PROCEDURE — 25010000002 IOPAMIDOL 61 % SOLUTION: Performed by: EMERGENCY MEDICINE

## 2022-09-28 PROCEDURE — 99284 EMERGENCY DEPT VISIT MOD MDM: CPT

## 2022-09-28 PROCEDURE — 81003 URINALYSIS AUTO W/O SCOPE: CPT | Performed by: EMERGENCY MEDICINE

## 2022-09-28 PROCEDURE — 25010000002 ONDANSETRON PER 1 MG: Performed by: EMERGENCY MEDICINE

## 2022-09-28 PROCEDURE — 25010000002 MORPHINE PER 10 MG: Performed by: EMERGENCY MEDICINE

## 2022-09-28 PROCEDURE — 96375 TX/PRO/DX INJ NEW DRUG ADDON: CPT

## 2022-09-28 PROCEDURE — 36415 COLL VENOUS BLD VENIPUNCTURE: CPT

## 2022-09-28 RX ORDER — ONDANSETRON 2 MG/ML
4 INJECTION INTRAMUSCULAR; INTRAVENOUS ONCE
Status: COMPLETED | OUTPATIENT
Start: 2022-09-28 | End: 2022-09-28

## 2022-09-28 RX ORDER — ONDANSETRON 4 MG/1
4 TABLET, ORALLY DISINTEGRATING ORAL EVERY 6 HOURS PRN
Qty: 12 TABLET | Refills: 0 | Status: SHIPPED | OUTPATIENT
Start: 2022-09-28 | End: 2022-10-03

## 2022-09-28 RX ORDER — SODIUM CHLORIDE 0.9 % (FLUSH) 0.9 %
10 SYRINGE (ML) INJECTION AS NEEDED
Status: DISCONTINUED | OUTPATIENT
Start: 2022-09-28 | End: 2022-09-29 | Stop reason: HOSPADM

## 2022-09-28 RX ADMIN — MORPHINE SULFATE 4 MG: 4 INJECTION INTRAVENOUS at 21:11

## 2022-09-28 RX ADMIN — ONDANSETRON 4 MG: 2 INJECTION INTRAMUSCULAR; INTRAVENOUS at 21:11

## 2022-09-28 RX ADMIN — IOPAMIDOL 90 ML: 612 INJECTION, SOLUTION INTRAVENOUS at 21:43

## 2022-09-29 LAB — HOLD SPECIMEN: NORMAL

## 2022-10-03 ENCOUNTER — OFFICE VISIT (OUTPATIENT)
Dept: FAMILY MEDICINE CLINIC | Facility: CLINIC | Age: 27
End: 2022-10-03

## 2022-10-03 VITALS
HEIGHT: 67 IN | OXYGEN SATURATION: 100 % | SYSTOLIC BLOOD PRESSURE: 126 MMHG | WEIGHT: 169.5 LBS | TEMPERATURE: 97.8 F | DIASTOLIC BLOOD PRESSURE: 70 MMHG | HEART RATE: 83 BPM | BODY MASS INDEX: 26.6 KG/M2

## 2022-10-03 DIAGNOSIS — R10.84 GENERALIZED ABDOMINAL PAIN: ICD-10-CM

## 2022-10-03 DIAGNOSIS — K92.1 HEMATOCHEZIA: Primary | ICD-10-CM

## 2022-10-03 DIAGNOSIS — R11.2 NAUSEA AND VOMITING, UNSPECIFIED VOMITING TYPE: ICD-10-CM

## 2022-10-03 PROCEDURE — 99203 OFFICE O/P NEW LOW 30 MIN: CPT | Performed by: STUDENT IN AN ORGANIZED HEALTH CARE EDUCATION/TRAINING PROGRAM

## 2022-10-03 RX ORDER — NAPROXEN SODIUM 220 MG
220 TABLET ORAL 2 TIMES DAILY PRN
COMMUNITY
End: 2022-10-31

## 2022-10-03 RX ORDER — SENNOSIDES 8.6 MG
650 CAPSULE ORAL EVERY 8 HOURS PRN
Qty: 90 TABLET | Refills: 0 | Status: SHIPPED | OUTPATIENT
Start: 2022-10-03 | End: 2022-10-31

## 2022-10-03 RX ORDER — PANTOPRAZOLE SODIUM 40 MG/1
40 TABLET, DELAYED RELEASE ORAL DAILY
Qty: 30 TABLET | Refills: 0 | Status: SHIPPED | OUTPATIENT
Start: 2022-10-03 | End: 2023-02-17 | Stop reason: SDUPTHER

## 2022-10-03 RX ORDER — ONDANSETRON 4 MG/1
4 TABLET, FILM COATED ORAL EVERY 8 HOURS PRN
Qty: 30 TABLET | Refills: 0 | Status: SHIPPED | OUTPATIENT
Start: 2022-10-03 | End: 2022-12-31

## 2022-10-03 NOTE — PROGRESS NOTES
Family Medicine Residency  Tammy Jimenez MD    Subjective:     Dorina Stockton is a 27 y.o. female who presents for bloody stool, and establish care. onset 1.5 week ago. Blood present on toilet paper and mixed with stool. Endorses pain, is Located in RUQ radiates to RLQ and moves across. also endorses Stomach pain, Hardly is able to eat, grades the pain 6-8/10. Reports she has intermittent N/V, Zofran helps. denies hematemesis. alieve helps with pain minimally. Denies constipation. Denies previous similar episode. Denies changes in stool caliber, or consistency of stool.   Patient went to the ED last week for the same problem, blood work and imaging was unremarkable.   Denies any dysuria, frequency or urgency.     Has a history of Hysterectomy in .         The following portions of the patient's history were reviewed and updated as appropriate: allergies, current medications, past family history, past medical history, past social history, past surgical history and problem list.    Past Medical Hx:  Past Medical History:   Diagnosis Date   • Abdominal pain    • Acute bronchitis    • Bipolar 1 disorder (HCC)    • Corneal abrasion    • Cyst of left ovary    • Depression    • Dietary counseling and surveillance    • Encounter for  visit    • General counseling and advice on female contraception     would like a gary   • Gestational diabetes mellitus     only during pregnancy   • Hip dysplasia, congenital     bilateral   • Manic depression (HCC)    • Multiple personalities (HCC)    • Nausea    • Pregnancy test positive     serum   • Tension type headache    • Viral gastroenteritis        Past Surgical Hx:  Past Surgical History:   Procedure Laterality Date   •  SECTION     • EAR TUBES     • HIP SURGERY Left    • RI HYSTEROSCOPY,W/ENDOMETRIAL ABLATION N/A 2017    Procedure: HYSTEROSCOPY WITH ENDOMETRIAL ABLATION;  Surgeon: Dominik Toribio MD;  Location: [facility];  Service:  Obstetrics/Gynecology   • TONSILLECTOMY      T & A   • TOTAL LAPAROSCOPIC HYSTERECTOMY SALPINGO OOPHORECTOMY Bilateral 11/30/2020    Procedure: TOTAL LAPAROSCOPIC HYSTERECTOMY BILATERAL salpingectomy, cystoscopy, Left Oophorectomy;  Surgeon: Ricardo Orellana DO;  Location: [facility];  Service: Obstetrics/Gynecology;  Laterality: Bilateral;   • TUBAL COAGULATION LAPAROSCOPIC Bilateral 12/20/2017    Procedure: EXAMINATION UNDER ANESTHESA, LAPAROSCOPIC TUBAL FULGURATION, DIAGNOISTIC HYSTEROSCOPY WITH FRACTIONAL DILATION AND CURETTAGE ENDOMETRAL ABLATION ;  Surgeon: Dominik Toribio MD;  Location: [facility];  Service:        Current Meds:    Current Outpatient Medications:   •  acetaminophen (Tylenol 8 Hour) 650 MG 8 hr tablet, Take 1 tablet by mouth Every 8 (Eight) Hours As Needed for Mild Pain., Disp: 90 tablet, Rfl: 0  •  naproxen sodium (ALEVE) 220 MG tablet, Take 220 mg by mouth 2 (Two) Times a Day As Needed., Disp: , Rfl:   •  ondansetron (Zofran) 4 MG tablet, Take 1 tablet by mouth Every 8 (Eight) Hours As Needed for Nausea or Vomiting., Disp: 30 tablet, Rfl: 0  •  pantoprazole (PROTONIX) 40 MG EC tablet, Take 1 tablet by mouth Daily., Disp: 30 tablet, Rfl: 0    Allergies:  Allergies   Allergen Reactions   • Strawberry Hives   • Sulfa Antibiotics Hives       Family Hx:  Family History   Problem Relation Age of Onset   • Other Father         carbon monoxide poisoning   • Cancer Other    • Diabetes Other    • Hypertension Other    • Stroke Other    • Cataracts Maternal Grandmother    • Cataracts Maternal Grandfather    • Cataracts Paternal Grandmother    • Cataracts Paternal Grandfather         Social History:  Social History     Socioeconomic History   • Marital status:    Tobacco Use   • Smoking status: Current Every Day Smoker     Packs/day: 0.25     Years: 12.00     Pack years: 3.00     Types: Cigarettes   • Smokeless tobacco: Never Used   Vaping Use   • Vaping Use: Never used   Substance and  "Sexual Activity   • Alcohol use: No   • Drug use: Not Currently     Types: Marijuana     Comment: STATED \"NONE SINCE AUGUST\"   • Sexual activity: Yes     Partners: Male     Birth control/protection: None, Surgical       Review of Systems  Review of Systems   Constitutional: Positive for appetite change. Negative for activity change, chills and fever.   HENT: Negative for sore throat, trouble swallowing and voice change.    Eyes: Negative for visual disturbance.   Respiratory: Negative for cough, shortness of breath and wheezing.    Cardiovascular: Negative for chest pain, palpitations and leg swelling.   Gastrointestinal: Positive for abdominal pain, nausea and vomiting. Negative for abdominal distention, constipation and diarrhea.   Genitourinary: Negative for difficulty urinating, frequency, pelvic pain, vaginal bleeding and vaginal discharge.   Musculoskeletal: Negative for arthralgias, back pain, joint swelling and myalgias.   Skin: Negative for color change.   Neurological: Negative for dizziness, weakness, light-headedness, numbness and headaches.   Psychiatric/Behavioral: Negative for agitation, confusion and sleep disturbance. The patient is not nervous/anxious.        Objective:     /70   Pulse 83   Temp 97.8 °F (36.6 °C)   Ht 170.2 cm (67\")   Wt 76.9 kg (169 lb 8 oz)   LMP 11/02/2020   SpO2 100%   BMI 26.55 kg/m²   Physical Exam  Vitals reviewed.   Constitutional:       Appearance: Normal appearance.   HENT:      Head: Normocephalic.   Eyes:      Conjunctiva/sclera: Conjunctivae normal.      Pupils: Pupils are equal, round, and reactive to light.   Cardiovascular:      Rate and Rhythm: Normal rate and regular rhythm.      Pulses: Normal pulses.      Heart sounds: Normal heart sounds.   Pulmonary:      Effort: Pulmonary effort is normal.      Breath sounds: Normal breath sounds.   Abdominal:      General: Bowel sounds are normal.      Palpations: Abdomen is soft.      Tenderness: There is " abdominal tenderness. There is no rebound.   Musculoskeletal:      Cervical back: Normal range of motion and neck supple.   Skin:     General: Skin is warm.   Neurological:      General: No focal deficit present.      Mental Status: She is alert. Mental status is at baseline.   Psychiatric:         Mood and Affect: Mood normal.         Behavior: Behavior normal.          Assessment/Plan:     Diagnoses and all orders for this visit:    1. Hematochezia (Primary)  -     Ambulatory Referral For Screening Colonoscopy  -     Ambulatory Referral to Gastroenterology    2. Nausea and vomiting, unspecified vomiting type  -     ondansetron (Zofran) 4 MG tablet; Take 1 tablet by mouth Every 8 (Eight) Hours As Needed for Nausea or Vomiting.  Dispense: 30 tablet; Refill: 0  -     pantoprazole (PROTONIX) 40 MG EC tablet; Take 1 tablet by mouth Daily.  Dispense: 30 tablet; Refill: 0  -     Ambulatory Referral to Gastroenterology    3. Generalized abdominal pain  -     acetaminophen (Tylenol 8 Hour) 650 MG 8 hr tablet; Take 1 tablet by mouth Every 8 (Eight) Hours As Needed for Mild Pain.  Dispense: 90 tablet; Refill: 0    1. Due to acute nature of hematochezia, and changes in appetite, patient needs urgent colonoscopy, and endoscopy. Already ordered colonoscopy, for Upper study will refer to gastro.  3. Advised to  stop using NSAID. Will prescribe tylenol for pain in the meantime.         Depression screening: Up to date; last screen 10/3/2022     Follow-up:     No follow-ups on file.    Preventative:  Health Maintenance   Topic Date Due   • ANNUAL PHYSICAL  Never done   • COVID-19 Vaccine (3 - Booster for Moderna series) 11/10/2021   • INFLUENZA VACCINE  03/31/2023 (Originally 8/1/2022)   • Pneumococcal Vaccine 0-64 (1 - PCV) 10/03/2023 (Originally 1/4/2000)   • PAP SMEAR  02/26/2023   • TDAP/TD VACCINES (2 - Td or Tdap) 02/10/2025   • HEPATITIS C SCREENING  Completed         Alcohol use:  reports no history of alcohol  use.  Nicotine status  reports that she has been smoking cigarettes. She has a 3.00 pack-year smoking history. She has never used smokeless tobacco.     Goals    None         RISK SCORE: 3      This document has been electronically signed by Tammy Jimenez MD on October 3, 2022 16:05 CDT

## 2022-10-03 NOTE — PROGRESS NOTES
I have seen the patient and I have reviewed the notes, assessments, and/or procedures performed by Dr. Jimenez during office visit. I concur with her/his documentation and assessment and plan for Dorina Stockton.           This document has been electronically signed by Dre Smith MD on October 3, 2022 16:24 CDT

## 2022-10-12 ENCOUNTER — OFFICE VISIT (OUTPATIENT)
Dept: GASTROENTEROLOGY | Facility: CLINIC | Age: 27
End: 2022-10-12

## 2022-10-12 VITALS
HEART RATE: 67 BPM | OXYGEN SATURATION: 99 % | BODY MASS INDEX: 26.49 KG/M2 | WEIGHT: 168.8 LBS | SYSTOLIC BLOOD PRESSURE: 138 MMHG | HEIGHT: 67 IN | DIASTOLIC BLOOD PRESSURE: 94 MMHG

## 2022-10-12 DIAGNOSIS — R19.7 DIARRHEA, UNSPECIFIED TYPE: ICD-10-CM

## 2022-10-12 DIAGNOSIS — R19.4 CHANGE IN BOWEL HABITS: ICD-10-CM

## 2022-10-12 DIAGNOSIS — R10.84 GENERALIZED ABDOMINAL PAIN: Primary | ICD-10-CM

## 2022-10-12 DIAGNOSIS — R11.0 NAUSEA: ICD-10-CM

## 2022-10-12 PROCEDURE — 99214 OFFICE O/P EST MOD 30 MIN: CPT | Performed by: NURSE PRACTITIONER

## 2022-10-12 RX ORDER — DEXTROSE AND SODIUM CHLORIDE 5; .45 G/100ML; G/100ML
30 INJECTION, SOLUTION INTRAVENOUS CONTINUOUS PRN
Status: CANCELLED | OUTPATIENT
Start: 2022-11-02

## 2022-10-12 NOTE — PROGRESS NOTES
Chief Complaint   Patient presents with   • Hematochezia   • Vomiting       Subjective    Dorina Debi Stockton is a 27 y.o. female. she is being seen for consultation today at the request of Tammy Jimenez MD                                                                  Assessment & Plan                                     1. Generalized abdominal pain    2. Nausea    3. Change in bowel habits    4. Diarrhea, unspecified type      Plan; schedule patient for EGD and colonoscopy due to abdominal pain nausea change in bowel habits and diarrhea will obtain biopsy rule out inflammatory bowel disease recommend she continue Protonix daily avoid NSAID use due to concern for peptic ulcer.  Follow-up after test return office sooner if needed    Follow-up: Return in about 4 weeks (around 11/9/2022) for Recheck, After test.     HPI  27-year-old female presents for follow-up after ER visit due to rectal bleeding abdominal pain change in bowel movements with diarrhea.  States symptoms started around September 21 denies any suspect food intake or known source of symptoms.  Was evaluated emergency department had CT which noted normal GI tract and lab work was stable.  Follow-up with PCP with referral here.  Reports too many bowel movements per day to count positive for nocturnal stooling has 2 aunts with colon issues unsure of exact etiology but denies any known history of colon cancer or inflammatory bowel disease.  States at times stool is dark but mostly has bright red blood mixed in with every bowel movement.  States has been taking Zofran regularly has not vomited but constantly feeling nauseated.  Denies any alcohol use recently states prior to onset of symptoms she would drink alcohol about 2 times per week on the weekends when her children were not home.  Denies any dysuria, frequency or urgency.  Had  "hysterectomy in 2020.      Review of Systems  Review of Systems   Constitutional: Positive for appetite change (early satiety ) and fatigue. Negative for activity change, chills, diaphoresis, fever and unexpected weight change.   HENT: Negative for sore throat and trouble swallowing.    Respiratory: Negative for shortness of breath.    Gastrointestinal: Positive for blood in stool, diarrhea, nausea and vomiting. Negative for abdominal distention, abdominal pain, anal bleeding, constipation and rectal pain.   Musculoskeletal: Negative for arthralgias.   Skin: Negative for pallor.   Neurological: Negative for light-headedness.       /94 (BP Location: Right arm, Patient Position: Sitting, Cuff Size: Adult)   Pulse 67   Ht 170.2 cm (67\")   Wt 76.6 kg (168 lb 12.8 oz)   LMP 2020   SpO2 99%   BMI 26.44 kg/m²     Objective      Physical Exam  Constitutional:       General: She is not in acute distress.     Appearance: Normal appearance. She is normal weight. She is not ill-appearing or toxic-appearing.   HENT:      Head: Normocephalic and atraumatic.   Pulmonary:      Effort: Pulmonary effort is normal.   Abdominal:      General: Abdomen is flat. Bowel sounds are normal. There is no distension.      Palpations: Abdomen is soft. There is no mass.      Tenderness: There is generalized abdominal tenderness.   Neurological:      Mental Status: She is alert.               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[facility])    • Nausea    • Pregnancy test positive     serum   • Tension type headache    • Viral gastroenteritis      Past Surgical History:   Procedure Laterality Date   •  SECTION     • EAR TUBES     • HIP SURGERY Left    • [place] HYSTEROSCOPY,W/ENDOMETRIAL ABLATION N/A 2017    Procedure: HYSTEROSCOPY WITH ENDOMETRIAL ABLATION;  Surgeon: Dominik Toribio MD;  Location: [facility];  Service: Obstetrics/Gynecology   • TONSILLECTOMY      T & A   • TOTAL LAPAROSCOPIC HYSTERECTOMY SALPINGO OOPHORECTOMY Bilateral 2020    Procedure: TOTAL LAPAROSCOPIC HYSTERECTOMY BILATERAL salpingectomy, cystoscopy, Left Oophorectomy;  Surgeon: Ricardo Orellana DO;  Location: [facility];  Service: Obstetrics/Gynecology;  Laterality: Bilateral;   • TUBAL COAGULATION LAPAROSCOPIC Bilateral 2017    Procedure: EXAMINATION UNDER ANESTHESA, LAPAROSCOPIC TUBAL FULGURATION, DIAGNOISTIC HYSTEROSCOPY WITH FRACTIONAL DILATION AND CURETTAGE ENDOMETRAL ABLATION ;  Surgeon: Dominik Toribio MD;  Location: [facility];  Service:      Family History   Problem Relation Age of Onset   • Other Father         carbon monoxide poisoning   • Cancer Other    • Diabetes Other    • Hypertension Other    • Stroke Other    • Cataracts Maternal Grandmother    • Cataracts Maternal Grandfather    • Cataracts Paternal Grandmother    • Cataracts Paternal Grandfather      OB History        3    Para   2    Term   2       0    AB   1    Living   2       SAB   1    IAB   0    Ectopic   0    Molar   0    Multiple   0    Live Births   2              Allergies   Allergen Reactions   • Strawberry Hives   • Sulfa Antibiotics Hives     Social History     Socioeconomic History   • Marital status:    Tobacco Use   • Smoking status: Former     Packs/day: 0.25     Years: 12.00     Pack years: 3.00     Types: Cigarettes   • Smokeless tobacco: Never   Vaping Use   • Vaping Use: Every day  "  Substance and Sexual Activity   • Alcohol use: No   • Drug use: Not Currently     Types: Marijuana     Comment: STATED \"NONE SINCE AUGUST\"   • Sexual activity: Yes     Partners: Male     Birth control/protection: None, Surgical     Current Medications:  Prior to Admission medications    Medication Sig Start Date End Date Taking? Authorizing Provider   acetaminophen (Tylenol 8 Hour) 650 MG 8 hr tablet Take 1 tablet by mouth Every 8 (Eight) Hours As Needed for Mild Pain. 10/3/22  Yes Dre Smith MD   ondansetron (Zofran) 4 MG tablet Take 1 tablet by mouth Every 8 (Eight) Hours As Needed for Nausea or Vomiting. 10/3/22  Yes Dre Smith MD   pantoprazole (PROTONIX) 40 MG EC tablet Take 1 tablet by mouth Daily. 10/3/22  Yes Dre Smith MD   naproxen sodium (ALEVE) 220 MG tablet Take 220 mg by mouth 2 (Two) Times a Day As Needed.    Provider, MD Melecio     Orders placed during this encounter include:  Orders Placed This Encounter   Procedures   • Follow Anesthesia Guidelines / Standing Orders     Standing Status:   Future   • Obtain Informed Consent     Standing Status:   Future     Order Specific Question:   Informed Consent Given For     Answer:   ESOPHAGOGASTRODUODENOSCOPY and Colonoscopy     ESOPHAGOGASTRODUODENOSCOPY (N/A), COLONOSCOPY (N/A)  New Medications Ordered This Visit   Medications   • polyethylene glycol (GoLYTELY) 236 g solution     Sig: Starting at noon on day prior to procedure, drink 8 ounces every 30 minutes until all gone or stools are clear. May add flavor packet.     Dispense:  4000 mL     Refill:  0         Review and/or summary of lab tests, radiology, procedures, medications. Review and summary of old records and obtaining of history. The risks and benefits of my recommendations, as well as other treatment options were discussed . Any questions/concerned were answered. Patient voiced understanding and agreement.          This document has been electronically signed by " Lexus Seals, APRN on October 12, 2022 13:39 CDT                                               Results for orders placed or performed during the hospital encounter of 09/28/22   Gray Top   Result Value Ref Range    Extra Tube Hold for add-ons.    Gold Top - SST   Result Value Ref Range    Extra Tube Hold for add-ons.    Green Top (Gel)   Result Value Ref Range    Extra Tube Hold for add-ons.    Scan Slide    Specimen: Blood   Result Value Ref Range    RBC Morphology Normal Normal    WBC Morphology Normal Normal    Platelet Estimate Decreased Normal   Urinalysis With Microscopic If Indicated (No Culture) - Urine, Clean Catch    Specimen: Urine, Clean Catch   Result Value Ref Range    Color, UA Yellow Yellow, Straw, Dark Yellow, Roxanna    Appearance, UA Cloudy (A) Clear    pH, UA 8.0 5.0 - 9.0    Specific Gravity, UA 1.016 1.003 - 1.030    Glucose, UA Negative Negative    Ketones, UA Negative Negative    Bilirubin, UA Negative Negative    Blood, UA Negative Negative    Protein, UA Negative Negative    Leuk Esterase, UA Negative Negative    Nitrite, UA Negative Negative    Urobilinogen, UA 1.0 E.U./dL 0.2 - 1.0 E.U./dL   CBC Auto Differential    Specimen: Blood   Result Value Ref Range    WBC 5.98 3.40 - 10.80 10*3/mm3    RBC 4.78 3.77 - 5.28 10*6/mm3    Hemoglobin 12.9 12.0 - 15.9 g/dL    Hematocrit 40.7 34.0 - 46.6 %    MCV 85.1 79.0 - 97.0 fL    MCH 27.0 26.6 - 33.0 pg    MCHC 31.7 31.5 - 35.7 g/dL    RDW 12.9 12.3 - 15.4 %    RDW-SD 40.2 37.0 - 54.0 fl    MPV 10.6 6.0 - 12.0 fL    Platelets 132 (L) 140 - 450 10*3/mm3    Neutrophil % 51.6 42.7 - 76.0 %    Lymphocyte % 39.0 19.6 - 45.3 %    Monocyte % 6.9 5.0 - 12.0 %    Eosinophil % 1.5 0.3 - 6.2 %    Basophil % 0.7 0.0 - 1.5 %    Immature Grans % 0.3 0.0 - 0.5 %    Neutrophils, Absolute 3.09 1.70 - 7.00 10*3/mm3    Lymphocytes, Absolute 2.33 0.70 - 3.10 10*3/mm3    Monocytes, Absolute 0.41 0.10 - 0.90 10*3/mm3    Eosinophils, Absolute 0.09 0.00 - 0.40 10*3/mm3     Basophils, Absolute 0.04 0.00 - 0.20 10*3/mm3    Immature Grans, Absolute 0.02 0.00 - 0.05 10*3/mm3    nRBC 0.0 0.0 - 0.2 /100 WBC   Lavender Top   Result Value Ref Range    Extra Tube hold for add-on    Light Blue Top   Result Value Ref Range    Extra Tube Hold for add-ons.    Lipase    Specimen: Blood   Result Value Ref Range    Lipase 26 13 - 60 U/L   Comprehensive Metabolic Panel    Specimen: Blood   Result Value Ref Range    Glucose 89 65 - 99 mg/dL    BUN 10 6 - 20 mg/dL    Creatinine 0.83 0.57 - 1.00 mg/dL    Sodium 139 136 - 145 mmol/L    Potassium 3.9 3.5 - 5.2 mmol/L    Chloride 100 98 - 107 mmol/L    CO2 32.0 (H) 22.0 - 29.0 mmol/L    Calcium 9.2 8.6 - 10.5 mg/dL    Total Protein 7.2 6.0 - 8.5 g/dL    Albumin 4.40 3.50 - 5.20 g/dL    ALT (SGPT) 27 1 - 33 U/L    AST (SGOT) 20 1 - 32 U/L    Alkaline Phosphatase 94 39 - 117 U/L    Total Bilirubin 0.2 0.0 - 1.2 mg/dL    Globulin 2.8 gm/dL    A/G Ratio 1.6 g/dL    BUN/Creatinine Ratio 12.0 7.0 - 25.0    Anion Gap 7.0 5.0 - 15.0 mmol/L    eGFR 98.6 >60.0 mL/min/1.73   Results for orders placed or performed during the hospital encounter of 08/17/22   POCT SARS-CoV-2 Antigen [facility])    Specimen: Swab   Result Value Ref Range    SARS Antigen Detected (A) Not Detected, Presumptive Negative    Internal Control Passed Passed    Lot Number [id number]     Expiration Date 1/1/23    Results for orders placed or performed during the hospital encounter of 08/02/21   COVID-19, [facility] IN-HOUSE, NP SWAB IN TRANSPORT MEDIA 8-10 HR TAT - Swab, Anterior nasal    Specimen: Anterior nasal; Swab   Result Value Ref Range    COVID19 Detected (C) Not Detected - Ref. Range   Results for orders placed or performed during the hospital encounter of 12/15/20   Gold Top - SST   Result Value Ref Range    Extra Tube Hold for add-ons.    Urinalysis, Microscopic Only - Urine, Clean Catch    Specimen: Urine, Clean Catch   Result Value Ref Range    RBC, UA 6-12 (A)  None Seen /HPF    WBC, UA 3-5 None Seen, 0-2, 3-5 /HPF    Bacteria, UA None Seen None Seen /HPF    Squamous Epithelial Cells, UA 3-5 (A) None Seen, 0-2 /HPF    Hyaline Casts, UA 3-6 None Seen /LPF    Methodology Automated Microscopy    Urinalysis With Microscopic If Indicated (No Culture) - Urine, Clean Catch    Specimen: Urine, Clean Catch   Result Value Ref Range    Color, UA Yellow Yellow, Straw, Dark Yellow, Roxanna    Appearance, UA Clear Clear    pH, UA 6.0 5.0 - 9.0    Specific Gravity, UA 1.028 1.003 - 1.030    Glucose, UA Negative Negative    Ketones, UA Negative Negative    Bilirubin, UA Negative Negative    Blood, UA Negative Negative    Protein, UA Negative Negative    Leuk Esterase, UA Small (1+) (A) Negative    Nitrite, UA Negative Negative    Urobilinogen, UA 1.0 E.U./dL 0.2 - 1.0 E.U./dL   CBC Auto Differential    Specimen: Blood   Result Value Ref Range    WBC 11.35 (H) 3.40 - 10.80 10*3/mm3    RBC 4.92 3.77 - 5.28 10*6/mm3    Hemoglobin 13.5 12.0 - 15.9 g/dL    Hematocrit 41.9 34.0 - 46.6 %    MCV 85.2 79.0 - 97.0 fL    MCH 27.4 26.6 - 33.0 pg    MCHC 32.2 31.5 - 35.7 g/dL    RDW 12.4 12.3 - 15.4 %    RDW-SD 38.1 37.0 - 54.0 fl    MPV 9.9 6.0 - 12.0 fL    Platelets 305 140 - 450 10*3/mm3    Neutrophil % 63.6 42.7 - 76.0 %    Lymphocyte % 25.6 19.6 - 45.3 %    Monocyte % 6.1 5.0 - 12.0 %    Eosinophil % 4.1 0.3 - 6.2 %    Basophil % 0.4 0.0 - 1.5 %    Immature Grans % 0.2 0.0 - 0.5 %    Neutrophils, Absolute 7.23 (H) 1.70 - 7.00 10*3/mm3    Lymphocytes, Absolute 2.90 0.70 - 3.10 10*3/mm3    Monocytes, Absolute 0.69 0.10 - 0.90 10*3/mm3    Eosinophils, Absolute 0.47 (H) 0.00 - 0.40 10*3/mm3    Basophils, Absolute 0.04 0.00 - 0.20 10*3/mm3    Immature Grans, Absolute 0.02 0.00 - 0.05 10*3/mm3    nRBC 0.0 0.0 - 0.2 /100 WBC     *Note: Due to a large number of results and/or encounters for the requested time period, some results have not been displayed. A complete set of results can be found in Results  Review.

## 2022-11-02 ENCOUNTER — ANESTHESIA EVENT (OUTPATIENT)
Dept: GASTROENTEROLOGY | Facility: HOSPITAL | Age: 27
End: 2022-11-02

## 2022-11-02 ENCOUNTER — ANESTHESIA (OUTPATIENT)
Dept: GASTROENTEROLOGY | Facility: HOSPITAL | Age: 27
End: 2022-11-02

## 2022-11-02 ENCOUNTER — HOSPITAL ENCOUNTER (OUTPATIENT)
Facility: HOSPITAL | Age: 27
Setting detail: HOSPITAL OUTPATIENT SURGERY
Discharge: HOME | End: 2022-11-02
Attending: INTERNAL MEDICINE | Admitting: INTERNAL MEDICINE

## 2022-11-02 VITALS
OXYGEN SATURATION: 99 % | TEMPERATURE: 97.8 F | RESPIRATION RATE: 16 BRPM | DIASTOLIC BLOOD PRESSURE: 59 MMHG | HEIGHT: 67 IN | BODY MASS INDEX: 25.33 KG/M2 | SYSTOLIC BLOOD PRESSURE: 109 MMHG | WEIGHT: 161.4 LBS | HEART RATE: 54 BPM

## 2022-11-02 DIAGNOSIS — R10.84 GENERALIZED ABDOMINAL PAIN: ICD-10-CM

## 2022-11-02 DIAGNOSIS — R11.0 NAUSEA: ICD-10-CM

## 2022-11-02 DIAGNOSIS — R19.4 CHANGE IN BOWEL HABITS: ICD-10-CM

## 2022-11-02 DIAGNOSIS — R19.7 DIARRHEA, UNSPECIFIED TYPE: ICD-10-CM

## 2022-11-02 PROCEDURE — 43239 EGD BIOPSY SINGLE/MULTIPLE: CPT | Performed by: INTERNAL MEDICINE

## 2022-11-02 PROCEDURE — 25010000002 PROPOFOL 10 MG/ML EMULSION: Performed by: NURSE ANESTHETIST, CERTIFIED REGISTERED

## 2022-11-02 PROCEDURE — 45380 COLONOSCOPY AND BIOPSY: CPT | Performed by: INTERNAL MEDICINE

## 2022-11-02 RX ORDER — DEXTROSE AND SODIUM CHLORIDE 5; .45 G/100ML; G/100ML
30 INJECTION, SOLUTION INTRAVENOUS CONTINUOUS PRN
Status: DISCONTINUED | OUTPATIENT
Start: 2022-11-02 | End: 2022-11-02 | Stop reason: HOSPADM

## 2022-11-02 RX ORDER — LIDOCAINE HYDROCHLORIDE 20 MG/ML
INJECTION, SOLUTION INTRAVENOUS AS NEEDED
Status: DISCONTINUED | OUTPATIENT
Start: 2022-11-02 | End: 2022-11-02 | Stop reason: SURG

## 2022-11-02 RX ORDER — PROPOFOL 10 MG/ML
VIAL (ML) INTRAVENOUS AS NEEDED
Status: DISCONTINUED | OUTPATIENT
Start: 2022-11-02 | End: 2022-11-02 | Stop reason: SURG

## 2022-11-02 RX ORDER — ONDANSETRON 2 MG/ML
4 INJECTION INTRAMUSCULAR; INTRAVENOUS ONCE AS NEEDED
Status: DISCONTINUED | OUTPATIENT
Start: 2022-11-02 | End: 2022-11-02 | Stop reason: HOSPADM

## 2022-11-02 RX ADMIN — PROPOFOL 50 MG: 10 INJECTION, EMULSION INTRAVENOUS at 14:22

## 2022-11-02 RX ADMIN — DEXTROSE AND SODIUM CHLORIDE 30 ML/HR: 5; 450 INJECTION, SOLUTION INTRAVENOUS at 13:48

## 2022-11-02 RX ADMIN — LIDOCAINE HYDROCHLORIDE 100 MG: 20 INJECTION, SOLUTION INTRAVENOUS at 14:18

## 2022-11-02 RX ADMIN — PROPOFOL 30 MG: 10 INJECTION, EMULSION INTRAVENOUS at 14:28

## 2022-11-02 RX ADMIN — PROPOFOL 50 MG: 10 INJECTION, EMULSION INTRAVENOUS at 14:24

## 2022-11-02 RX ADMIN — PROPOFOL 50 MG: 10 INJECTION, EMULSION INTRAVENOUS at 14:20

## 2022-11-02 RX ADMIN — PROPOFOL 40 MG: 10 INJECTION, EMULSION INTRAVENOUS at 14:32

## 2022-11-02 RX ADMIN — PROPOFOL 50 MG: 10 INJECTION, EMULSION INTRAVENOUS at 14:26

## 2022-11-02 RX ADMIN — PROPOFOL 100 MG: 10 INJECTION, EMULSION INTRAVENOUS at 14:18

## 2022-11-02 RX ADMIN — PROPOFOL 30 MG: 10 INJECTION, EMULSION INTRAVENOUS at 14:30

## 2022-11-02 NOTE — ANESTHESIA PREPROCEDURE EVALUATION
Anesthesia Evaluation     Patient summary reviewed and Nursing notes reviewed   NPO Solid Status: > 8 hours  NPO Liquid Status: > 2 hours           Airway   Mallampati: II  TM distance: >3 FB  Neck ROM: full  No difficulty expected  Dental    (+) poor dentition    Comment: All upper teeth missing, all remaining lower teeth in poor condition      Pulmonary - normal exam   (+) a smoker Current Smoked day of surgery,   Cardiovascular - negative cardio ROS and normal exam        Neuro/Psych  (+) headaches, psychiatric history Bipolar and Depression,    GI/Hepatic/Renal/Endo    (+)  GERD,    (-) diabetes    Musculoskeletal (-) negative ROS    Abdominal  - normal exam   Substance History   (+) drug use (THC)     OB/GYN negative ob/gyn ROS         Other - negative ROS                     Anesthesia Plan    ASA 3     general   total IV anesthesia  intravenous induction     Anesthetic plan, risks, benefits, and alternatives have been provided, discussed and informed consent has been obtained with: patient.  Pre-procedure education provided  Plan discussed with CRNA.        CODE STATUS:

## 2022-11-02 NOTE — ANESTHESIA POSTPROCEDURE EVALUATION
Patient: Dorina Stockton    Procedure Summary     Date: 11/02/22 Room / Location: [facility] ENDOSCOPY 1 / [facility] ENDOSCOPY    Anesthesia Start: 1413 Anesthesia Stop: 1437    Procedures:       ESOPHAGOGASTRODUODENOSCOPY      COLONOSCOPY Diagnosis:       Generalized abdominal pain      Nausea      Change in bowel habits      Diarrhea, unspecified type      (Generalized abdominal pain [R10.84])      (Nausea [R11.0])      (Change in bowel habits [R19.4])      (Diarrhea, unspecified type [R19.7])    Surgeons: Johny España MD Provider: Janelle Mishra CRNA    Anesthesia Type: general ASA Status: 3          Anesthesia Type: general    Vitals  No vitals data found for the desired time range.          Post Anesthesia Care and Evaluation    Patient location during evaluation: bedside  Patient participation: complete - patient participated  Level of consciousness: sleepy but conscious  Pain management: adequate    Airway patency: patent  Anesthetic complications: No anesthetic complications  PONV Status: none  Cardiovascular status: acceptable and hemodynamically stable  Respiratory status: acceptable and room air    Comments: 103/60  66  99%  16  97.5

## 2022-11-04 LAB — REF LAB TEST METHOD: NORMAL

## 2022-11-28 ENCOUNTER — APPOINTMENT (OUTPATIENT)
Dept: GENERAL RADIOLOGY | Facility: HOSPITAL | Age: 27
End: 2022-11-28

## 2022-11-28 ENCOUNTER — HOSPITAL ENCOUNTER (EMERGENCY)
Facility: HOSPITAL | Age: 27
Discharge: HOME | End: 2022-11-28
Attending: EMERGENCY MEDICINE | Admitting: EMERGENCY MEDICINE

## 2022-11-28 VITALS
WEIGHT: 155 LBS | HEART RATE: 100 BPM | HEIGHT: 67 IN | BODY MASS INDEX: 24.33 KG/M2 | OXYGEN SATURATION: 99 % | DIASTOLIC BLOOD PRESSURE: 70 MMHG | TEMPERATURE: 98.7 F | SYSTOLIC BLOOD PRESSURE: 126 MMHG | RESPIRATION RATE: 16 BRPM

## 2022-11-28 DIAGNOSIS — M79.641 RIGHT HAND PAIN: Primary | ICD-10-CM

## 2022-11-28 PROCEDURE — 73130 X-RAY EXAM OF HAND: CPT

## 2022-11-28 PROCEDURE — 73110 X-RAY EXAM OF WRIST: CPT

## 2022-11-28 PROCEDURE — 99283 EMERGENCY DEPT VISIT LOW MDM: CPT

## 2022-11-28 RX ORDER — IBUPROFEN 600 MG/1
600 TABLET ORAL ONCE
Status: COMPLETED | OUTPATIENT
Start: 2022-11-28 | End: 2022-11-28

## 2022-11-28 RX ADMIN — IBUPROFEN 600 MG: 600 TABLET, FILM COATED ORAL at 15:10

## 2022-12-12 ENCOUNTER — OFFICE VISIT (OUTPATIENT)
Dept: ORTHOPEDIC SURGERY | Facility: CLINIC | Age: 27
End: 2022-12-12

## 2022-12-12 VITALS — WEIGHT: 163 LBS | HEIGHT: 67 IN | BODY MASS INDEX: 25.58 KG/M2

## 2022-12-12 DIAGNOSIS — R20.0 NUMBNESS OF FINGER: ICD-10-CM

## 2022-12-12 DIAGNOSIS — T14.90XA INJURY: ICD-10-CM

## 2022-12-12 DIAGNOSIS — M79.641 RIGHT HAND PAIN: Primary | ICD-10-CM

## 2022-12-12 PROCEDURE — 99214 OFFICE O/P EST MOD 30 MIN: CPT | Performed by: NURSE PRACTITIONER

## 2022-12-12 RX ORDER — MELOXICAM 7.5 MG/1
15 TABLET ORAL DAILY
Qty: 60 TABLET | Refills: 0 | Status: SHIPPED | OUTPATIENT
Start: 2022-12-12 | End: 2022-12-31

## 2022-12-12 NOTE — PROGRESS NOTES
Dorina Stockton is a 27 y.o. female   Primary provider:  Tammy Jimenez MD       Chief Complaint   Patient presents with   • Right Hand - Initial Evaluation, Pain       HISTORY OF PRESENT ILLNESS: This 27-year-old female patient presents today with complaints of right hand pain, ulnar side.  Patient reports she was removing her pajama top and when she reached overhead, the ceiling fan hit her hand.  Date of injury 2022.  The patient reports she went to urgent care after the injury on 2022.  Patient describes her pain as moderate, constant with crushing, grinding, stabbing, aching popping pain.  The pain is worse with gripping.  Other symptoms reported include numbness to the distal aspect of the fifth digit.  Denies fever and chills.  Sent for x-ray upon arrival.  Patient reports her pain is 8 out of 10.  Hand Pain   Incident onset: 2022. The injury mechanism was a direct blow. The pain is present in the right hand. The quality of the pain is described as aching, burning, cramping, shooting and stabbing. The pain radiates to the right arm and right hand. The pain is at a severity of 8/10. The pain is moderate. The pain has been constant since the incident. Associated symptoms include numbness. Associated symptoms comments: Clicking, popping, snapping. The symptoms are aggravated by movement and lifting.        CONCURRENT MEDICAL HISTORY:    Past Medical History:   Diagnosis Date   • Abdominal pain    • Acute bronchitis    • Bipolar 1 disorder (HCC)    • Corneal abrasion    • Cyst of left ovary    • Depression    • Dietary counseling and surveillance    • Encounter for  visit    • General counseling and advice on female contraception     would like a gary   • Gestational diabetes mellitus     only during pregnancy   • Hip dysplasia, congenital     bilateral   • Manic depression (HCC)    • Multiple personalities (HCC)    • Nausea    • Pregnancy test positive     serum   • Tension type  headache    • Viral gastroenteritis        Allergies   Allergen Reactions   • Strawberry Hives   • Sulfa Antibiotics Hives         Current Outpatient Medications:   •  ondansetron (Zofran) 4 MG tablet, Take 1 tablet by mouth Every 8 (Eight) Hours As Needed for Nausea or Vomiting., Disp: 30 tablet, Rfl: 0  •  pantoprazole (PROTONIX) 40 MG EC tablet, Take 1 tablet by mouth Daily., Disp: 30 tablet, Rfl: 0  •  meloxicam (Mobic) 7.5 MG tablet, Take 2 tablets by mouth Daily for 30 days., Disp: 60 tablet, Rfl: 0  •  polyethylene glycol (GoLYTELY) 236 g solution, Starting at noon on day prior to procedure, drink 8 ounces every 30 minutes until all gone or stools are clear. May add flavor packet., Disp: 4000 mL, Rfl: 0    Past Surgical History:   Procedure Laterality Date   •  SECTION     • COLONOSCOPY N/A 2022    Procedure: COLONOSCOPY;  Surgeon: Johny España MD;  Location: [facility] ENDOSCOPY;  Service: Gastroenterology;  Laterality: N/A;   • EAR TUBES     • ENDOSCOPY N/A 2022    Procedure: ESOPHAGOGASTRODUODENOSCOPY;  Surgeon: Johny España MD;  Location: [facility] ENDOSCOPY;  Service: Gastroenterology;  Laterality: N/A;   • HIP SURGERY Left    • [place] HYSTEROSCOPY,W/ENDOMETRIAL ABLATION N/A 2017    Procedure: HYSTEROSCOPY WITH ENDOMETRIAL ABLATION;  Surgeon: Dominik Toribio MD;  Location: [facility] OR;  Service: Obstetrics/Gynecology   • TONSILLECTOMY      T & A   • TOTAL LAPAROSCOPIC HYSTERECTOMY SALPINGO OOPHORECTOMY Bilateral 2020    Procedure: TOTAL LAPAROSCOPIC HYSTERECTOMY BILATERAL salpingectomy, cystoscopy, Left Oophorectomy;  Surgeon: Ricardo Orellana DO;  Location: [facility] OR;  Service: Obstetrics/Gynecology;  Laterality: Bilateral;   • TUBAL COAGULATION LAPAROSCOPIC Bilateral 2017    Procedure: EXAMINATION UNDER ANESTHESA, LAPAROSCOPIC TUBAL FULGURATION, DIAGNOISTIC HYSTEROSCOPY WITH FRACTIONAL DILATION AND CURETTAGE ENDOMETRAL ABLATION ;  Surgeon: Dominik SIERRA  "MD Catarino;  Location: [facility];  Service:        Family History   Problem Relation Age of Onset   • Other Father         carbon monoxide poisoning   • Cancer Other    • Diabetes Other    • Hypertension Other    • Stroke Other    • Cataracts Maternal Grandmother    • Cataracts Maternal Grandfather    • Cataracts Paternal Grandmother    • Cataracts Paternal Grandfather         Social History     Socioeconomic History   • Marital status:    Tobacco Use   • Smoking status: Every Day     Packs/day: 0.25     Years: 12.00     Pack years: 3.00     Types: Cigarettes   • Smokeless tobacco: Never   Vaping Use   • Vaping Use: Every day   • Substances: Nicotine, Flavoring   • Devices: Disposable   Substance and Sexual Activity   • Alcohol use: No   • Drug use: Yes     Types: Marijuana   • Sexual activity: Yes     Partners: Male     Birth control/protection: None, Surgical        Review of Systems   Neurological: Positive for numbness.       PHYSICAL EXAMINATION:       Ht 170.2 cm (67\")   Wt 73.9 kg (163 lb)   LMP 11/02/2020   BMI 25.53 kg/m²     Physical Exam    GAIT:     []  Normal  []  Antalgic    Assistive device: []  None  []  Walker     []  Crutches  []  Cane     []  Wheelchair  []  Stretcher    Right Hand Exam     Other   Erythema: absent  Sensation: normal  Pulse: present    Comments:  Unable to make a tight fist.  Pain disproportionate to exam.  No swelling.  Small 1 cm abrasion.  Skin warm, dry and intact.  Cap refill good.  Decreased sensation.  Able to wiggle fingers freely.  Patient reports pain at the MCP joint when moving finger.  Neurovascular intact.                 XR Wrist 3+ View Right    Result Date: 11/28/2022  Narrative: EXAM: XR WRIST 3 OR MORE VIEWS, XR HAND 3 OR MORE VIEWS COMPARISON: None INDICATION: injury. Hip by ceiling fan FINDINGS: 4 view right wrist. 3 view right hand. No acute fracture or dislocation. No suspicious osseous lesion. Joint spaces are preserved. Soft tissues are " unremarkable.     Impression: No acute osseous abnormality. Electronically signed by:  Adeline Cam MD  11/28/2022 3:26 PM CST Workstation: [id number]    XR Hand 3+ View Right    Result Date: 11/28/2022  Narrative: EXAM: XR WRIST 3 OR MORE VIEWS, XR HAND 3 OR MORE VIEWS COMPARISON: None INDICATION: injury. Hip by ceiling fan FINDINGS: 4 view right wrist. 3 view right hand. No acute fracture or dislocation. No suspicious osseous lesion. Joint spaces are preserved. Soft tissues are unremarkable.     Impression: No acute osseous abnormality. Electronically signed by:  Adeline Cam MD  11/28/2022 3:26 PM CST Workstation: [id number]          ASSESSMENT:    Diagnoses and all orders for this visit:    Right hand pain  -     XR Hand 3+ View Right  -     XR Finger 2+ View Right  -     EMG & Nerve Conduction Test; Future  -     meloxicam (Mobic) 7.5 MG tablet; Take 2 tablets by mouth Daily for 30 days.    Injury  -     EMG & Nerve Conduction Test; Future  -     meloxicam (Mobic) 7.5 MG tablet; Take 2 tablets by mouth Daily for 30 days.    Numbness of finger  -     EMG & Nerve Conduction Test; Future          PLAN  This patient had an x-ray on 11/28/2022.  The x-ray was negative.  Sent the patient for repeat x-ray to ensure there was no changes.  Reviewed the right wrist and fifth finger x-ray.  Discussed with patient and .  Advised patient no acute osseous findings are noted.  Soft tissue unremarkable.  Recommended the patient to modify activity based on pain.  Advised patient if she needs to, she may tape her fourth and fifth digit together for activity for protection.  Perform RICE therapy.  Recommended a EMG-NCS for testing of the complaint of numbness in her fifth digit.  Advised someone will call to schedule the appointment and I will notify of results when available.   Follow-up for EMG-NCS results.  May follow-up sooner as needed if symptoms change or worsen.    All questions and concerns are  addressed with understanding noted. They are aware and are in agreement to this plan.    Return for for emg-ncs results.    YUNIOR Medina    This document has been electronically signed by YUNIOR Medina on December 12, 2022 15:46 CST      EMR Dragon/Transciption Disclaimer: Some of this note may be an electronic transcription/translation of spoken language to printed text using the Dragon Dictation System.     Time spent of a minimum of 30 minutes including the face to face evaluation, reviewing of medical history and prior medial records, reviewing of diagnostic studies, ordering additional tests, documentation, patient education and coordination of care.     Body mass index is 25.53 kg/m².

## 2022-12-31 ENCOUNTER — HOSPITAL ENCOUNTER (EMERGENCY)
Facility: HOSPITAL | Age: 27
Discharge: HOME | End: 2022-12-31
Attending: STUDENT IN AN ORGANIZED HEALTH CARE EDUCATION/TRAINING PROGRAM | Admitting: STUDENT IN AN ORGANIZED HEALTH CARE EDUCATION/TRAINING PROGRAM
Payer: COMMERCIAL

## 2022-12-31 VITALS
HEART RATE: 69 BPM | DIASTOLIC BLOOD PRESSURE: 85 MMHG | TEMPERATURE: 98.6 F | OXYGEN SATURATION: 100 % | SYSTOLIC BLOOD PRESSURE: 154 MMHG | BODY MASS INDEX: 23.54 KG/M2 | HEIGHT: 67 IN | RESPIRATION RATE: 20 BRPM | WEIGHT: 150 LBS

## 2022-12-31 DIAGNOSIS — R09.81 NASAL CONGESTION: ICD-10-CM

## 2022-12-31 DIAGNOSIS — R51.9 NONINTRACTABLE HEADACHE, UNSPECIFIED CHRONICITY PATTERN, UNSPECIFIED HEADACHE TYPE: ICD-10-CM

## 2022-12-31 DIAGNOSIS — R53.83 FATIGUE, UNSPECIFIED TYPE: ICD-10-CM

## 2022-12-31 DIAGNOSIS — R68.83 CHILLS: ICD-10-CM

## 2022-12-31 DIAGNOSIS — R05.9 COUGH, UNSPECIFIED TYPE: Primary | ICD-10-CM

## 2022-12-31 LAB
FLUAV SUBTYP SPEC NAA+PROBE: NOT DETECTED
FLUBV RNA ISLT QL NAA+PROBE: NOT DETECTED
SARS-COV-2 RNA PNL SPEC NAA+PROBE: NOT DETECTED

## 2022-12-31 PROCEDURE — 87636 SARSCOV2 & INF A&B AMP PRB: CPT | Performed by: STUDENT IN AN ORGANIZED HEALTH CARE EDUCATION/TRAINING PROGRAM

## 2022-12-31 PROCEDURE — 99283 EMERGENCY DEPT VISIT LOW MDM: CPT

## 2022-12-31 PROCEDURE — C9803 HOPD COVID-19 SPEC COLLECT: HCPCS

## 2022-12-31 RX ORDER — KETOROLAC TROMETHAMINE 10 MG/1
20 TABLET, FILM COATED ORAL ONCE
Status: COMPLETED | OUTPATIENT
Start: 2022-12-31 | End: 2022-12-31

## 2022-12-31 RX ORDER — BENZONATATE 100 MG/1
100 CAPSULE ORAL ONCE
Status: COMPLETED | OUTPATIENT
Start: 2022-12-31 | End: 2022-12-31

## 2022-12-31 RX ORDER — BENZONATATE 100 MG/1
100 CAPSULE ORAL 3 TIMES DAILY PRN
Qty: 12 CAPSULE | Refills: 0 | OUTPATIENT
Start: 2022-12-31 | End: 2023-01-11

## 2022-12-31 RX ADMIN — KETOROLAC TROMETHAMINE 20 MG: 10 TABLET, FILM COATED ORAL at 01:51

## 2022-12-31 RX ADMIN — BENZONATATE 100 MG: 100 CAPSULE ORAL at 01:51

## 2022-12-31 NOTE — ED PROVIDER NOTES
Subjective   History of Present Illness  27-year-old female comes to the ER chief complaint of a headache, cough, stuffy nose for the last day.  Her child was diagnosed with COVID recently.  Patient thinks she might have caught it as well today.  She denies other symptoms.  She has not taken anything.  Nothing makes it feel better or worse.    History provided by:  Patient   used: No        Review of Systems   Constitutional: Positive for activity change, chills and fatigue. Negative for appetite change and fever.   HENT: Positive for congestion. Negative for drooling and rhinorrhea.    Eyes: Negative for redness.   Respiratory: Positive for cough. Negative for chest tightness, shortness of breath and wheezing.    Cardiovascular: Negative for chest pain and palpitations.   Gastrointestinal: Negative for abdominal pain, nausea and vomiting.   Genitourinary: Negative for dysuria, flank pain, frequency, hematuria and urgency.   Musculoskeletal: Negative for arthralgias and myalgias.   Skin: Negative for color change.   Neurological: Positive for headaches. Negative for dizziness, seizures, syncope, weakness, light-headedness and numbness.   Psychiatric/Behavioral: Negative for confusion.       Past Medical History:   Diagnosis Date   • Abdominal pain    • Acute bronchitis    • Bipolar 1 disorder (HCC)    • Corneal abrasion    • Cyst of left ovary    • Depression    • Dietary counseling and surveillance    • Encounter for  visit    • General counseling and advice on female contraception     would like a gary   • Gestational diabetes mellitus     only during pregnancy   • Hip dysplasia, congenital     bilateral   • Manic depression (HCC)    • Multiple personalities ([facility])    • Nausea    • Pregnancy test positive     serum   • Tension type headache    • Viral gastroenteritis        Allergies   Allergen Reactions   • Strawberry Hives   • Sulfa Antibiotics Hives       Past Surgical History:    Procedure Laterality Date   •  SECTION     • COLONOSCOPY N/A 2022    Procedure: COLONOSCOPY;  Surgeon: Johny España MD;  Location: [facility] ENDOSCOPY;  Service: Gastroenterology;  Laterality: N/A;   • EAR TUBES     • ENDOSCOPY N/A 2022    Procedure: ESOPHAGOGASTRODUODENOSCOPY;  Surgeon: Johny España MD;  Location: [facility] ENDOSCOPY;  Service: Gastroenterology;  Laterality: N/A;   • HIP SURGERY Left    • [place] HYSTEROSCOPY,W/ENDOMETRIAL ABLATION N/A 2017    Procedure: HYSTEROSCOPY WITH ENDOMETRIAL ABLATION;  Surgeon: Dominik Toribio MD;  Location: [facility] OR;  Service: Obstetrics/Gynecology   • TONSILLECTOMY      T & A   • TOTAL LAPAROSCOPIC HYSTERECTOMY SALPINGO OOPHORECTOMY Bilateral 2020    Procedure: TOTAL LAPAROSCOPIC HYSTERECTOMY BILATERAL salpingectomy, cystoscopy, Left Oophorectomy;  Surgeon: Ricardo Orellana DO;  Location: [facility] OR;  Service: Obstetrics/Gynecology;  Laterality: Bilateral;   • TUBAL COAGULATION LAPAROSCOPIC Bilateral 2017    Procedure: EXAMINATION UNDER ANESTHESA, LAPAROSCOPIC TUBAL FULGURATION, DIAGNOISTIC HYSTEROSCOPY WITH FRACTIONAL DILATION AND CURETTAGE ENDOMETRAL ABLATION ;  Surgeon: Dominik Toribio MD;  Location: [facility] OR;  Service:        Family History   Problem Relation Age of Onset   • Other Father         carbon monoxide poisoning   • Cancer Other    • Diabetes Other    • Hypertension Other    • Stroke Other    • Cataracts Maternal Grandmother    • Cataracts Maternal Grandfather    • Cataracts Paternal Grandmother    • Cataracts Paternal Grandfather        Social History     Socioeconomic History   • Marital status:    Tobacco Use   • Smoking status: Every Day     Packs/day: 0.25     Years: 12.00     Pack years: 3.00     Types: Cigarettes   • Smokeless tobacco: Never   Vaping Use   • Vaping Use: Every day   • Substances: Nicotine, Flavoring   • Devices: Disposable   Substance and Sexual Activity   • Alcohol use:  No   • Drug use: Yes     Types: Marijuana   • Sexual activity: Yes     Partners: Male     Birth control/protection: None, Surgical           Objective    Vitals:    12/31/22 0057   BP: 154/85   BP Location: Left arm   Patient Position: Sitting   Pulse: 69   Resp: 20   Temp: 98.6 °F (37 °C)   SpO2: 100%   Weight: 68 kg (150 lb)   Height: 170.2 cm (67\")       Physical Exam  Vitals and nursing note reviewed.   Constitutional:       General: She is not in acute distress.     Appearance: She is well-developed. She is not diaphoretic.   HENT:      Head: Normocephalic.      Right Ear: External ear normal.      Left Ear: External ear normal.      Nose: Congestion present.   Eyes:      Conjunctiva/sclera: Conjunctivae normal.   Pulmonary:      Effort: Pulmonary effort is normal. No accessory muscle usage or respiratory distress.   Skin:     General: Skin is warm and dry.   Neurological:      Mental Status: She is oriented to person, place, and time.   Psychiatric:         Behavior: Behavior normal.         Procedures           ED Course      Results for orders placed or performed during the hospital encounter of 12/31/22   COVID-19 and FLU A/B PCR - Swab, Nasopharynx    Specimen: Nasopharynx; Swab   Result Value Ref Range    COVID19 Not Detected Not Detected - Ref. Range    Influenza A PCR Not Detected Not Detected    Influenza B PCR Not Detected Not Detected                                          Medical Decision Making  Vital signs are stable, afebrile.  Patient received Tessalon Perle for her cough.  COVID and flu are negative.  Recommend follow-up with her PCP.  Return precautions given.    Chills: acute illness or injury  Cough, unspecified type: acute illness or injury  Fatigue, unspecified type: acute illness or injury  Nasal congestion: acute illness or injury  Nonintractable headache, unspecified chronicity pattern, unspecified headache type: acute illness or injury  Risk  Prescription drug  management.          Final diagnoses:   Cough, unspecified type   Nasal congestion   Fatigue, unspecified type   Chills   Nonintractable headache, unspecified chronicity pattern, unspecified headache type       ED Disposition  ED Disposition     ED Disposition   Discharge    Condition   Stable    Comment   --             Tammy Jimenez MD  [facility] [address] [place] [address] [phone]    Schedule an appointment as soon as possible for a visit in 2 days  ER follow up         Medication List      New Prescriptions    benzonatate 100 MG capsule  Commonly known as: TESSALON  Take 1 capsule by mouth 3 (Three) Times a Day As Needed for Cough.           Where to Get Your Medications      These medications were sent to [facility][id number] - [place] [id number] [facility] - [phone]  - [phone] [address] [address]    Phone: [phone]   · benzonatate 100 MG capsule             Zhen Clark MD  12/31/22 0153

## 2023-02-12 ENCOUNTER — HOSPITAL ENCOUNTER (EMERGENCY)
Facility: HOSPITAL | Age: 28
Discharge: HOME | End: 2023-02-12
Attending: STUDENT IN AN ORGANIZED HEALTH CARE EDUCATION/TRAINING PROGRAM | Admitting: STUDENT IN AN ORGANIZED HEALTH CARE EDUCATION/TRAINING PROGRAM
Payer: COMMERCIAL

## 2023-02-12 ENCOUNTER — APPOINTMENT (OUTPATIENT)
Dept: GENERAL RADIOLOGY | Facility: HOSPITAL | Age: 28
End: 2023-02-12
Payer: COMMERCIAL

## 2023-02-12 VITALS
RESPIRATION RATE: 16 BRPM | DIASTOLIC BLOOD PRESSURE: 78 MMHG | TEMPERATURE: 97.9 F | SYSTOLIC BLOOD PRESSURE: 146 MMHG | HEIGHT: 67 IN | WEIGHT: 160 LBS | BODY MASS INDEX: 25.11 KG/M2 | HEART RATE: 80 BPM | OXYGEN SATURATION: 100 %

## 2023-02-12 DIAGNOSIS — M25.571 ACUTE RIGHT ANKLE PAIN: Primary | ICD-10-CM

## 2023-02-12 DIAGNOSIS — R55 SYNCOPE, UNSPECIFIED SYNCOPE TYPE: ICD-10-CM

## 2023-02-12 LAB
ALBUMIN SERPL-MCNC: 3.9 G/DL (ref 3.5–5.2)
ALBUMIN/GLOB SERPL: 1.6 G/DL
ALP SERPL-CCNC: 70 U/L (ref 39–117)
ALT SERPL W P-5'-P-CCNC: 14 U/L (ref 1–33)
ANION GAP SERPL CALCULATED.3IONS-SCNC: 9 MMOL/L (ref 5–15)
AST SERPL-CCNC: 16 U/L (ref 1–32)
BASOPHILS # BLD AUTO: 0.04 10*3/MM3 (ref 0–0.2)
BASOPHILS NFR BLD AUTO: 0.6 % (ref 0–1.5)
BILIRUB SERPL-MCNC: 0.2 MG/DL (ref 0–1.2)
BUN SERPL-MCNC: 12 MG/DL (ref 6–20)
BUN/CREAT SERPL: 16 (ref 7–25)
CALCIUM SPEC-SCNC: 9 MG/DL (ref 8.6–10.5)
CHLORIDE SERPL-SCNC: 105 MMOL/L (ref 98–107)
CO2 SERPL-SCNC: 23 MMOL/L (ref 22–29)
CREAT SERPL-MCNC: 0.75 MG/DL (ref 0.57–1)
DEPRECATED RDW RBC AUTO: 39 FL (ref 37–54)
EGFRCR SERPLBLD CKD-EPI 2021: 110.7 ML/MIN/1.73
EOSINOPHIL # BLD AUTO: 0.11 10*3/MM3 (ref 0–0.4)
EOSINOPHIL NFR BLD AUTO: 1.8 % (ref 0.3–6.2)
ERYTHROCYTE [DISTWIDTH] IN BLOOD BY AUTOMATED COUNT: 12.9 % (ref 12.3–15.4)
GLOBULIN UR ELPH-MCNC: 2.5 GM/DL
GLUCOSE SERPL-MCNC: 95 MG/DL (ref 65–99)
HCT VFR BLD AUTO: 36.9 % (ref 34–46.6)
HGB BLD-MCNC: 12 G/DL (ref 12–15.9)
IMM GRANULOCYTES # BLD AUTO: 0.01 10*3/MM3 (ref 0–0.05)
IMM GRANULOCYTES NFR BLD AUTO: 0.2 % (ref 0–0.5)
LYMPHOCYTES # BLD AUTO: 2.43 10*3/MM3 (ref 0.7–3.1)
LYMPHOCYTES NFR BLD AUTO: 38.7 % (ref 19.6–45.3)
MAGNESIUM SERPL-MCNC: 2.1 MG/DL (ref 1.6–2.6)
MCH RBC QN AUTO: 27 PG (ref 26.6–33)
MCHC RBC AUTO-ENTMCNC: 32.5 G/DL (ref 31.5–35.7)
MCV RBC AUTO: 83.1 FL (ref 79–97)
MONOCYTES # BLD AUTO: 0.43 10*3/MM3 (ref 0.1–0.9)
MONOCYTES NFR BLD AUTO: 6.8 % (ref 5–12)
NEUTROPHILS NFR BLD AUTO: 3.26 10*3/MM3 (ref 1.7–7)
NEUTROPHILS NFR BLD AUTO: 51.9 % (ref 42.7–76)
NRBC BLD AUTO-RTO: 0 /100 WBC (ref 0–0.2)
NT-PROBNP SERPL-MCNC: 52.3 PG/ML (ref 0–450)
PLATELET # BLD AUTO: 202 10*3/MM3 (ref 140–450)
PMV BLD AUTO: 10.2 FL (ref 6–12)
POTASSIUM SERPL-SCNC: 4.7 MMOL/L (ref 3.5–5.2)
PROT SERPL-MCNC: 6.4 G/DL (ref 6–8.5)
RBC # BLD AUTO: 4.44 10*6/MM3 (ref 3.77–5.28)
SODIUM SERPL-SCNC: 137 MMOL/L (ref 136–145)
TROPONIN T SERPL HS-MCNC: <6 NG/L
WBC NRBC COR # BLD: 6.28 10*3/MM3 (ref 3.4–10.8)

## 2023-02-12 PROCEDURE — 99284 EMERGENCY DEPT VISIT MOD MDM: CPT

## 2023-02-12 PROCEDURE — 84484 ASSAY OF TROPONIN QUANT: CPT | Performed by: STUDENT IN AN ORGANIZED HEALTH CARE EDUCATION/TRAINING PROGRAM

## 2023-02-12 PROCEDURE — 36415 COLL VENOUS BLD VENIPUNCTURE: CPT

## 2023-02-12 PROCEDURE — 73610 X-RAY EXAM OF ANKLE: CPT

## 2023-02-12 PROCEDURE — 83735 ASSAY OF MAGNESIUM: CPT | Performed by: STUDENT IN AN ORGANIZED HEALTH CARE EDUCATION/TRAINING PROGRAM

## 2023-02-12 PROCEDURE — 80053 COMPREHEN METABOLIC PANEL: CPT | Performed by: STUDENT IN AN ORGANIZED HEALTH CARE EDUCATION/TRAINING PROGRAM

## 2023-02-12 PROCEDURE — 83880 ASSAY OF NATRIURETIC PEPTIDE: CPT | Performed by: STUDENT IN AN ORGANIZED HEALTH CARE EDUCATION/TRAINING PROGRAM

## 2023-02-12 PROCEDURE — 71045 X-RAY EXAM CHEST 1 VIEW: CPT

## 2023-02-12 PROCEDURE — 85025 COMPLETE CBC W/AUTO DIFF WBC: CPT | Performed by: STUDENT IN AN ORGANIZED HEALTH CARE EDUCATION/TRAINING PROGRAM

## 2023-02-12 PROCEDURE — 93005 ELECTROCARDIOGRAM TRACING: CPT | Performed by: STUDENT IN AN ORGANIZED HEALTH CARE EDUCATION/TRAINING PROGRAM

## 2023-02-12 PROCEDURE — 93010 ELECTROCARDIOGRAM REPORT: CPT | Performed by: INTERNAL MEDICINE

## 2023-02-12 RX ORDER — IBUPROFEN 600 MG/1
600 TABLET ORAL ONCE
Status: COMPLETED | OUTPATIENT
Start: 2023-02-12 | End: 2023-02-12

## 2023-02-12 RX ADMIN — IBUPROFEN 600 MG: 600 TABLET, FILM COATED ORAL at 22:04

## 2023-02-13 NOTE — ED PROVIDER NOTES
Subjective   History of Present Illness  28-year-old female comes to the ER chief complaint of right ankle pain after she stood up from the bed and passed out.  Patient remembers falling down and catching herself before she landed on the ground.  She did not hit her head.  Patient came to on the ground.  She reports feeling lightheaded when she stood up from the bed.  Patient reports feeling fine now other than right ankle pain and pain on the right side of her ribs.  She denies other symptoms.    History provided by:  Patient   used: No        Review of Systems   Constitutional: Negative for chills and fever.   HENT: Negative for drooling.    Eyes: Negative for redness.   Respiratory: Negative for shortness of breath.    Cardiovascular: Negative for chest pain.   Gastrointestinal: Negative for abdominal pain, nausea and vomiting.   Genitourinary: Negative for flank pain.   Musculoskeletal: Positive for arthralgias. Negative for back pain, myalgias and neck pain.   Skin: Negative for color change.   Neurological: Positive for syncope and light-headedness. Negative for dizziness, seizures, weakness, numbness and headaches.   Psychiatric/Behavioral: Negative for confusion.       Past Medical History:   Diagnosis Date   • Abdominal pain    • Acute bronchitis    • Bipolar 1 disorder (HCC)    • Corneal abrasion    • Cyst of left ovary    • Depression    • Dietary counseling and surveillance    • Encounter for  visit    • General counseling and advice on female contraception     would like a gary   • Gestational diabetes mellitus     only during pregnancy   • Hip dysplasia, congenital     bilateral   • Manic depression (HCC)    • Multiple personalities (HCC)    • Nausea    • Pregnancy test positive     serum   • Tension type headache    • Viral gastroenteritis        Allergies   Allergen Reactions   • Strawberry Hives   • Sulfa Antibiotics Hives       Past Surgical History:   Procedure  Laterality Date   •  SECTION     • COLONOSCOPY N/A 2022    Procedure: COLONOSCOPY;  Surgeon: Johny España MD;  Location: [facility] ENDOSCOPY;  Service: Gastroenterology;  Laterality: N/A;   • EAR TUBES     • ENDOSCOPY N/A 2022    Procedure: ESOPHAGOGASTRODUODENOSCOPY;  Surgeon: Johny España MD;  Location: [facility] ENDOSCOPY;  Service: Gastroenterology;  Laterality: N/A;   • HIP SURGERY Left    • TX HYSTEROSCOPY ENDOMETRIAL ABLATION N/A 2017    Procedure: HYSTEROSCOPY WITH ENDOMETRIAL ABLATION;  Surgeon: Dominik Toribio MD;  Location: [facility] OR;  Service: Obstetrics/Gynecology   • TONSILLECTOMY      T & A   • TOTAL LAPAROSCOPIC HYSTERECTOMY SALPINGO OOPHORECTOMY Bilateral 2020    Procedure: TOTAL LAPAROSCOPIC HYSTERECTOMY BILATERAL salpingectomy, cystoscopy, Left Oophorectomy;  Surgeon: Ricardo Orellana DO;  Location: [facility] OR;  Service: Obstetrics/Gynecology;  Laterality: Bilateral;   • TUBAL COAGULATION LAPAROSCOPIC Bilateral 2017    Procedure: EXAMINATION UNDER ANESTHESA, LAPAROSCOPIC TUBAL FULGURATION, DIAGNOISTIC HYSTEROSCOPY WITH FRACTIONAL DILATION AND CURETTAGE ENDOMETRAL ABLATION ;  Surgeon: Dominik Toribio MD;  Location: [facility] OR;  Service:        Family History   Problem Relation Age of Onset   • Other Father         carbon monoxide poisoning   • Cancer Other    • Diabetes Other    • Hypertension Other    • Stroke Other    • Cataracts Maternal Grandmother    • Cataracts Maternal Grandfather    • Cataracts Paternal Grandmother    • Cataracts Paternal Grandfather        Social History     Socioeconomic History   • Marital status:    Tobacco Use   • Smoking status: Former     Packs/day: 0.25     Years: 12.00     Pack years: 3.00     Types: Cigarettes     Quit date: 2023     Years since quittin.0   • Smokeless tobacco: Never   Vaping Use   • Vaping Use: Every day   • Substances: Nicotine, Flavoring   • Devices: Disposable   Substance  and Sexual Activity   • Alcohol use: No   • Drug use: Not Currently     Types: Marijuana   • Sexual activity: Yes     Partners: Male     Birth control/protection: None, Surgical           Objective    Vitals:    02/12/23 2135 02/12/23 2136 02/12/23 2138 02/12/23 2139   BP: 126/64 126/64 131/72 146/78   BP Location:       Patient Position:  Lying Sitting Standing   Pulse: 63 63 61 80   Resp: 16      Temp:       TempSrc:       SpO2: 100%      Weight:       Height:           Physical Exam  Vitals and nursing note reviewed.   Constitutional:       General: She is not in acute distress.     Appearance: She is well-developed. She is not ill-appearing, toxic-appearing or diaphoretic.   Cardiovascular:      Pulses: Normal pulses.   Pulmonary:      Effort: Pulmonary effort is normal. No accessory muscle usage or respiratory distress.   Chest:      Chest wall: Tenderness present.   Abdominal:      Palpations: Abdomen is soft.      Tenderness: There is no abdominal tenderness (deep palpation). There is no guarding or rebound.   Musculoskeletal:      Right ankle: Swelling present. No deformity. Tenderness present. Decreased range of motion.      Left ankle: Normal.   Skin:     General: Skin is warm and dry.      Capillary Refill: Capillary refill takes less than 2 seconds.   Neurological:      Mental Status: She is alert and oriented to person, place, and time.      Sensory: No sensory deficit.      Motor: No weakness.      Coordination: Coordination normal.         ECG 12 Lead Syncope      Date/Time: 2/12/2023 10:14 PM  Performed by: Zhen Clark MD  Authorized by: Zhen Clark MD   Interpreted by physician  Rhythm: sinus rhythm  Rate: normal  BPM: 66  QRS axis: normal  ST Segments: ST segments normal  T Waves: T waves normal  Clinical impression: normal ECG                 ED Course      Results for orders placed or performed during the hospital encounter of 02/12/23   Comprehensive Metabolic Panel    Specimen:  Blood   Result Value Ref Range    Glucose 95 65 - 99 mg/dL    BUN 12 6 - 20 mg/dL    Creatinine 0.75 0.57 - 1.00 mg/dL    Sodium 137 136 - 145 mmol/L    Potassium 4.7 3.5 - 5.2 mmol/L    Chloride 105 98 - 107 mmol/L    CO2 23.0 22.0 - 29.0 mmol/L    Calcium 9.0 8.6 - 10.5 mg/dL    Total Protein 6.4 6.0 - 8.5 g/dL    Albumin 3.9 3.5 - 5.2 g/dL    ALT (SGPT) 14 1 - 33 U/L    AST (SGOT) 16 1 - 32 U/L    Alkaline Phosphatase 70 39 - 117 U/L    Total Bilirubin 0.2 0.0 - 1.2 mg/dL    Globulin 2.5 gm/dL    A/G Ratio 1.6 g/dL    BUN/Creatinine Ratio 16.0 7.0 - 25.0    Anion Gap 9.0 5.0 - 15.0 mmol/L    eGFR 110.7 >60.0 mL/min/1.73   BNP    Specimen: Blood   Result Value Ref Range    proBNP 52.3 0.0 - 450.0 pg/mL   Single High Sensitivity Troponin T    Specimen: Blood   Result Value Ref Range    HS Troponin T <6 <10 ng/L   Magnesium    Specimen: Blood   Result Value Ref Range    Magnesium 2.1 1.6 - 2.6 mg/dL   CBC Auto Differential    Specimen: Blood   Result Value Ref Range    WBC 6.28 3.40 - 10.80 10*3/mm3    RBC 4.44 3.77 - 5.28 10*6/mm3    Hemoglobin 12.0 12.0 - 15.9 g/dL    Hematocrit 36.9 34.0 - 46.6 %    MCV 83.1 79.0 - 97.0 fL    MCH 27.0 26.6 - 33.0 pg    MCHC 32.5 31.5 - 35.7 g/dL    RDW 12.9 12.3 - 15.4 %    RDW-SD 39.0 37.0 - 54.0 fl    MPV 10.2 6.0 - 12.0 fL    Platelets 202 140 - 450 10*3/mm3    Neutrophil % 51.9 42.7 - 76.0 %    Lymphocyte % 38.7 19.6 - 45.3 %    Monocyte % 6.8 5.0 - 12.0 %    Eosinophil % 1.8 0.3 - 6.2 %    Basophil % 0.6 0.0 - 1.5 %    Immature Grans % 0.2 0.0 - 0.5 %    Neutrophils, Absolute 3.26 1.70 - 7.00 10*3/mm3    Lymphocytes, Absolute 2.43 0.70 - 3.10 10*3/mm3    Monocytes, Absolute 0.43 0.10 - 0.90 10*3/mm3    Eosinophils, Absolute 0.11 0.00 - 0.40 10*3/mm3    Basophils, Absolute 0.04 0.00 - 0.20 10*3/mm3    Immature Grans, Absolute 0.01 0.00 - 0.05 10*3/mm3    nRBC 0.0 0.0 - 0.2 /100 WBC     XR Chest 1 View   Final Result      No acute cardiopulmonary process.      Electronically  signed by:  Daily Epperson MD, JORDAN  2/12/2023   9:53 PM CST Workstation: [id number]      XR Ankle 3+ View Right   Final Result   No acute fracture or subluxation.      Electronically signed by:  Marybel Del Rosario  2/12/2023 9:37 PM CST   Workstation: [id number]                Medical Decision Making  Vital signs are stable, afebrile.  Labs are unremarkable.  His x-ray of the chest and ankle negative for acute findings.  EKG sinus rhythm with no acute ischemic changes.  Patient did not want an IV for IVF bolus.  She is tolerating p.o. intake without difficulty.  Orthostatics were negative.  Patient's not feeling lightheaded.  Will Ace wrap and provide crutches for that ankle.  Return precautions given.  Recommend follow-up with PCP and podiatry.  Patient states understanding and is agreeable to the plan.    Acute right ankle pain: acute illness or injury  Syncope, unspecified syncope type: acute illness or injury  Amount and/or Complexity of Data Reviewed  Labs: ordered.  Radiology: ordered.  ECG/medicine tests: ordered and independent interpretation performed.      Risk  Prescription drug management.          Final diagnoses:   Acute right ankle pain   Syncope, unspecified syncope type       ED Disposition  ED Disposition     ED Disposition   Discharge    Condition   Stable    Comment   --             Tammy Jimenez MD  [address]   [facility] [address] [phone]    Schedule an appointment as soon as possible for a visit in 2 days  ER follow up    [facility] POD[facility]AT[facility]  [address]  [facility] [address] [place] [id number] [phone]  Schedule an appointment as soon as possible for a visit in 2 days  ER follow up         Medication List      No changes were made to your prescriptions during this visit.          Zhen Clark MD  02/12/23 2323

## 2023-02-15 ENCOUNTER — TRANSCRIBE ORDERS (OUTPATIENT)
Dept: PODIATRY | Facility: CLINIC | Age: 28
End: 2023-02-15
Payer: COMMERCIAL

## 2023-02-15 DIAGNOSIS — M25.571 ACUTE RIGHT ANKLE PAIN: Primary | ICD-10-CM

## 2023-02-17 ENCOUNTER — OFFICE VISIT (OUTPATIENT)
Dept: FAMILY MEDICINE CLINIC | Facility: CLINIC | Age: 28
End: 2023-02-17
Payer: COMMERCIAL

## 2023-02-17 VITALS
WEIGHT: 159.3 LBS | HEART RATE: 69 BPM | OXYGEN SATURATION: 99 % | HEIGHT: 67 IN | BODY MASS INDEX: 25 KG/M2 | SYSTOLIC BLOOD PRESSURE: 118 MMHG | DIASTOLIC BLOOD PRESSURE: 68 MMHG

## 2023-02-17 DIAGNOSIS — R11.2 NAUSEA AND VOMITING, UNSPECIFIED VOMITING TYPE: ICD-10-CM

## 2023-02-17 DIAGNOSIS — K21.9 GASTROESOPHAGEAL REFLUX DISEASE, UNSPECIFIED WHETHER ESOPHAGITIS PRESENT: Primary | ICD-10-CM

## 2023-02-17 PROCEDURE — 99213 OFFICE O/P EST LOW 20 MIN: CPT | Performed by: STUDENT IN AN ORGANIZED HEALTH CARE EDUCATION/TRAINING PROGRAM

## 2023-02-17 RX ORDER — PANTOPRAZOLE SODIUM 40 MG/1
40 TABLET, DELAYED RELEASE ORAL DAILY
Qty: 30 TABLET | Refills: 0 | Status: SHIPPED | OUTPATIENT
Start: 2023-02-17 | End: 2023-02-21

## 2023-02-17 RX ORDER — BENZONATATE 200 MG/1
CAPSULE ORAL
COMMUNITY
Start: 2023-02-09

## 2023-02-21 RX ORDER — PANTOPRAZOLE SODIUM 40 MG/1
40 TABLET, DELAYED RELEASE ORAL DAILY
Qty: 30 TABLET | Refills: 0 | Status: SHIPPED | OUTPATIENT
Start: 2023-02-21 | End: 2023-03-16 | Stop reason: SDUPTHER

## 2023-02-22 NOTE — PROGRESS NOTES
Family Medicine Residency  Tammy Jimenez MD    Subjective:     Dorina Stockton is a 28 y.o. female who presents for ED visit follow up. She denies any dizziness or ankle pain. She endorses some acid reflux symptoms.     The following portions of the patient's history were reviewed and updated as appropriate: allergies, current medications, past family history, past medical history, past social history, past surgical history and problem list.    Past Medical Hx:  Past Medical History:   Diagnosis Date   • Abdominal pain    • Acute bronchitis    • Bipolar 1 disorder (HCC)    • Corneal abrasion    • Cyst of left ovary    • Depression    • Dietary counseling and surveillance    • Encounter for  visit    • General counseling and advice on female contraception     would like a gary   • Gestational diabetes mellitus     only during pregnancy   • Hip dysplasia, congenital     bilateral   • Manic depression (HCC)    • Multiple personalities (HCC)    • Nausea    • Pregnancy test positive     serum   • Tension type headache    • Viral gastroenteritis        Past Surgical Hx:  Past Surgical History:   Procedure Laterality Date   •  SECTION     • COLONOSCOPY N/A 2022    Procedure: COLONOSCOPY;  Surgeon: Johny España MD;  Location: [facility] ENDOSCOPY;  Service: Gastroenterology;  Laterality: N/A;   • EAR TUBES     • ENDOSCOPY N/A 2022    Procedure: ESOPHAGOGASTRODUODENOSCOPY;  Surgeon: Johny España MD;  Location: [facility] ENDOSCOPY;  Service: Gastroenterology;  Laterality: N/A;   • HIP SURGERY Left    • MD HYSTEROSCOPY ENDOMETRIAL ABLATION N/A 2017    Procedure: HYSTEROSCOPY WITH ENDOMETRIAL ABLATION;  Surgeon: Dominik Toribio MD;  Location: [facility] OR;  Service: Obstetrics/Gynecology   • TONSILLECTOMY      T & A   • TOTAL LAPAROSCOPIC HYSTERECTOMY SALPINGO OOPHORECTOMY Bilateral 2020    Procedure: TOTAL LAPAROSCOPIC HYSTERECTOMY BILATERAL salpingectomy, cystoscopy, Left  Oophorectomy;  Surgeon: Ricardo Orellana DO;  Location: [facility];  Service: Obstetrics/Gynecology;  Laterality: Bilateral;   • TUBAL COAGULATION LAPAROSCOPIC Bilateral 2017    Procedure: EXAMINATION UNDER ANESTHESA, LAPAROSCOPIC TUBAL FULGURATION, DIAGNOISTIC HYSTEROSCOPY WITH FRACTIONAL DILATION AND CURETTAGE ENDOMETRAL ABLATION ;  Surgeon: Dominik Toribio MD;  Location: [facility];  Service:        Current Meds:    Current Outpatient Medications:   •  ondansetron ODT (ZOFRAN-ODT) 8 MG disintegrating tablet, Place 1 tablet on the tongue Every 8 (Eight) Hours As Needed for Nausea or Vomiting., Disp: 20 tablet, Rfl: 0  •  pantoprazole (PROTONIX) 40 MG EC tablet, Take 1 tablet by mouth Daily., Disp: 30 tablet, Rfl: 0  •  benzonatate (TESSALON) 200 MG capsule, , Disp: , Rfl:     Allergies:  Allergies   Allergen Reactions   • Strawberry Hives   • Sulfa Antibiotics Hives       Family Hx:  Family History   Problem Relation Age of Onset   • Other Father         carbon monoxide poisoning   • Cancer Other    • Diabetes Other    • Hypertension Other    • Stroke Other    • Cataracts Maternal Grandmother    • Cataracts Maternal Grandfather    • Cataracts Paternal Grandmother    • Cataracts Paternal Grandfather         Social History:  Social History     Socioeconomic History   • Marital status:    Tobacco Use   • Smoking status: Former     Packs/day: 0.25     Years: 12.00     Pack years: 3.00     Types: Cigarettes     Quit date: 2023     Years since quittin.0   • Smokeless tobacco: Never   Vaping Use   • Vaping Use: Every day   • Substances: Nicotine, Flavoring   • Devices: Disposable   Substance and Sexual Activity   • Alcohol use: No   • Drug use: Not Currently     Types: Marijuana   • Sexual activity: Yes     Partners: Male     Birth control/protection: None, Surgical       Review of Systems  Review of Systems   Constitutional: Negative.    HENT: Negative.    Respiratory: Negative.   "  Cardiovascular: Negative.    Gastrointestinal: Negative.    Genitourinary: Negative.    Musculoskeletal: Negative.    Neurological: Negative.        Objective:     /68   Pulse 69   Ht 170.2 cm (67\")   Wt 72.3 kg (159 lb 4.8 oz)   LMP 11/02/2020   SpO2 99%   BMI 24.95 kg/m²   Physical Exam  Constitutional:       Appearance: Normal appearance.   HENT:      Head: Normocephalic and atraumatic.   Cardiovascular:      Rate and Rhythm: Normal rate and regular rhythm.      Pulses: Normal pulses.      Heart sounds: Normal heart sounds.   Pulmonary:      Effort: Pulmonary effort is normal.      Breath sounds: Normal breath sounds.   Abdominal:      General: Bowel sounds are normal.   Musculoskeletal:      Cervical back: Normal range of motion and neck supple.   Neurological:      Mental Status: She is alert.          Assessment/Plan:     Diagnoses and all orders for this visit:    1. Gastroesophageal reflux disease, unspecified whether esophagitis present (Primary)  -     pantoprazole (PROTONIX) 40 MG EC tablet; Take 1 tablet by mouth Daily.  Dispense: 30 tablet; Refill: 0        Will give Protonix- if does not alleviate symptoms will referr to gi for further evaluation.      Depression screening: Up to date; last screen 10/3/2022     Follow-up:     Return in about 3 months (around 5/17/2023), or if symptoms worsen or fail to improve.    Preventative:  Health Maintenance   Topic Date Due   • ANNUAL PHYSICAL  Never done   • COVID-19 Vaccine (3 - Booster for Moderna series) 11/10/2021   • INFLUENZA VACCINE  03/31/2023 (Originally 8/1/2022)   • PAP SMEAR  02/26/2023   • TDAP/TD VACCINES (2 - Td or Tdap) 02/10/2025   • COLORECTAL CANCER SCREENING  11/02/2044   • HEPATITIS C SCREENING  Completed   • Pneumococcal Vaccine 0-64  Aged Out         Alcohol use:  reports no history of alcohol use.  Nicotine status  reports that she quit smoking about 3 weeks ago. Her smoking use included cigarettes. She has a 3.00 pack-year " smoking history. She has never used smokeless tobacco.     Goals    None         RISK SCORE: 2      This document has been electronically signed by Tammy Jimenez MD on February 21, 2023 23:04 CST

## 2023-02-23 NOTE — PROGRESS NOTES
I have reviewed the notes, assessments, and/or procedures performed by ,Tammy Jimenez MD I concur with documentation of Dorina Stockton.

## 2023-02-27 LAB
QT INTERVAL: 408 MS
QTC INTERVAL: 427 MS

## 2023-03-16 DIAGNOSIS — K21.9 GASTROESOPHAGEAL REFLUX DISEASE, UNSPECIFIED WHETHER ESOPHAGITIS PRESENT: ICD-10-CM

## 2023-03-17 RX ORDER — PANTOPRAZOLE SODIUM 40 MG/1
40 TABLET, DELAYED RELEASE ORAL DAILY
Qty: 30 TABLET | Refills: 0 | Status: SHIPPED | OUTPATIENT
Start: 2023-03-17

## 2023-04-10 ENCOUNTER — HOSPITAL ENCOUNTER (EMERGENCY)
Facility: HOSPITAL | Age: 28
Discharge: HOME | End: 2023-04-10
Attending: EMERGENCY MEDICINE | Admitting: EMERGENCY MEDICINE
Payer: COMMERCIAL

## 2023-04-10 VITALS
HEIGHT: 67 IN | OXYGEN SATURATION: 99 % | TEMPERATURE: 98.1 F | DIASTOLIC BLOOD PRESSURE: 86 MMHG | RESPIRATION RATE: 18 BRPM | WEIGHT: 162 LBS | SYSTOLIC BLOOD PRESSURE: 164 MMHG | BODY MASS INDEX: 25.43 KG/M2 | HEART RATE: 83 BPM

## 2023-04-10 DIAGNOSIS — R22.0 FACIAL SWELLING: ICD-10-CM

## 2023-04-10 DIAGNOSIS — K04.7 DENTAL INFECTION: Primary | ICD-10-CM

## 2023-04-10 PROCEDURE — 99283 EMERGENCY DEPT VISIT LOW MDM: CPT

## 2023-04-10 RX ORDER — PENICILLIN V POTASSIUM 500 MG/1
500 TABLET ORAL ONCE
Status: COMPLETED | OUTPATIENT
Start: 2023-04-10 | End: 2023-04-10

## 2023-04-10 RX ORDER — PENICILLIN V POTASSIUM 500 MG/1
500 TABLET ORAL 4 TIMES DAILY
Qty: 40 TABLET | Refills: 0 | Status: SHIPPED | OUTPATIENT
Start: 2023-04-10 | End: 2023-04-20

## 2023-04-10 RX ORDER — HYDROCODONE BITARTRATE AND ACETAMINOPHEN 5; 325 MG/1; MG/1
1 TABLET ORAL ONCE
Status: COMPLETED | OUTPATIENT
Start: 2023-04-10 | End: 2023-04-10

## 2023-04-10 RX ADMIN — HYDROCODONE BITARTRATE AND ACETAMINOPHEN 1 TABLET: 5; 325 TABLET ORAL at 02:47

## 2023-04-10 RX ADMIN — PENICILLIN V POTASSIUM 500 MG: 500 TABLET, FILM COATED ORAL at 02:47

## 2023-04-10 NOTE — DISCHARGE INSTRUCTIONS
Please return with new or worsening symptoms.  Get antibiotic prescription filled and take as directed for the complete course.  Please follow closely with dentistry for definitive care as discussed.

## 2023-04-10 NOTE — ED PROVIDER NOTES
Subjective   History of Present Illness  28 year old female presents tot he ED with complaint of facial pain and swelling of the area around the mouth similar to when she was previously diagnosed with facial cellulitis. Reports that was about 2 years ago and resolved with antibiotics but she as unable to follow-up with dentistry and continues to have some teeth that need intervention.     Family history, surgical history, social history, current medications and allergies are reviewed with the patient and triage documentation and vitals are reviewed.      History provided by:  Patient and parent   used: No        Review of Systems   Constitutional: Negative for chills and fever.   HENT: Positive for dental problem and facial swelling. Negative for congestion, ear pain, mouth sores, nosebleeds, postnasal drip, rhinorrhea, sinus pressure, sinus pain, sore throat and trouble swallowing.    Eyes: Negative.    Respiratory: Negative.    Cardiovascular: Negative.    Gastrointestinal: Negative for diarrhea and vomiting.   Endocrine: Negative.    Genitourinary: Negative.    Musculoskeletal: Negative.  Negative for back pain and neck pain.   Skin: Negative for rash and wound.   Allergic/Immunologic: Negative.    Neurological: Negative.    Hematological: Negative.    Psychiatric/Behavioral: Negative.        Past Medical History:   Diagnosis Date   • Abdominal pain    • Acute bronchitis    • Bipolar 1 disorder    • Corneal abrasion    • Cyst of left ovary    • Depression    • Dietary counseling and surveillance    • Encounter for  visit    • General counseling and advice on female contraception     would like a gary   • Gestational diabetes mellitus     only during pregnancy   • Hip dysplasia, congenital     bilateral   • Manic depression    • Multiple personalities    • Nausea    • Pregnancy test positive     serum   • Tension type headache    • Viral gastroenteritis        Allergies   Allergen  Reactions   • Strawberry Hives   • Sulfa Antibiotics Hives       Past Surgical History:   Procedure Laterality Date   •  SECTION     • COLONOSCOPY N/A 2022    Procedure: COLONOSCOPY;  Surgeon: Johny España MD;  Location: [facility] ENDOSCOPY;  Service: Gastroenterology;  Laterality: N/A;   • EAR TUBES     • ENDOSCOPY N/A 2022    Procedure: ESOPHAGOGASTRODUODENOSCOPY;  Surgeon: Johny España MD;  Location: [facility] ENDOSCOPY;  Service: Gastroenterology;  Laterality: N/A;   • HIP SURGERY Left    • LA HYSTEROSCOPY ENDOMETRIAL ABLATION N/A 2017    Procedure: HYSTEROSCOPY WITH ENDOMETRIAL ABLATION;  Surgeon: Dominik Toribio MD;  Location: [facility] OR;  Service: Obstetrics/Gynecology   • TONSILLECTOMY      T & A   • TOTAL LAPAROSCOPIC HYSTERECTOMY SALPINGO OOPHORECTOMY Bilateral 2020    Procedure: TOTAL LAPAROSCOPIC HYSTERECTOMY BILATERAL salpingectomy, cystoscopy, Left Oophorectomy;  Surgeon: Ricardo Orellana DO;  Location: [facility] OR;  Service: Obstetrics/Gynecology;  Laterality: Bilateral;   • TUBAL COAGULATION LAPAROSCOPIC Bilateral 2017    Procedure: EXAMINATION UNDER ANESTHESA, LAPAROSCOPIC TUBAL FULGURATION, DIAGNOISTIC HYSTEROSCOPY WITH FRACTIONAL DILATION AND CURETTAGE ENDOMETRAL ABLATION ;  Surgeon: Dominik Toribio MD;  Location: [facility] OR;  Service:        Family History   Problem Relation Age of Onset   • Other Father         carbon monoxide poisoning   • Cancer Other    • Diabetes Other    • Hypertension Other    • Stroke Other    • Cataracts Maternal Grandmother    • Cataracts Maternal Grandfather    • Cataracts Paternal Grandmother    • Cataracts Paternal Grandfather        Social History     Socioeconomic History   • Marital status:    Tobacco Use   • Smoking status: Former     Packs/day: 0.25     Years: 12.00     Pack years: 3.00     Types: Cigarettes     Quit date: 2023     Years since quittin.2   • Smokeless tobacco: Never   Vaping  Use   • Vaping Use: Every day   • Substances: Nicotine, Flavoring   • Devices: Disposable   Substance and Sexual Activity   • Alcohol use: No   • Drug use: Not Currently     Types: Marijuana   • Sexual activity: Yes     Partners: Male     Birth control/protection: None, Surgical           Objective   Physical Exam  Vitals and nursing note reviewed.   Constitutional:       Appearance: Normal appearance. She is normal weight.   HENT:      Head: Normocephalic. No right periorbital erythema or left periorbital erythema.      Nose: Nose normal.      Mouth/Throat:      Lips: Pink.      Mouth: Mucous membranes are moist.      Dentition: Abnormal dentition. Gingival swelling and dental caries present.      Tongue: No lesions. Tongue does not deviate from midline.      Palate: No lesions.      Pharynx: Oropharynx is clear. No pharyngeal swelling, oropharyngeal exudate or posterior oropharyngeal erythema.      Tonsils: No tonsillar exudate.   Eyes:      Conjunctiva/sclera: Conjunctivae normal.      Pupils: Pupils are equal, round, and reactive to light.   Cardiovascular:      Rate and Rhythm: Normal rate and regular rhythm.   Pulmonary:      Effort: Pulmonary effort is normal.      Breath sounds: Normal breath sounds.   Musculoskeletal:         General: Normal range of motion.      Cervical back: Normal range of motion and neck supple. No rigidity.   Lymphadenopathy:      Cervical: No cervical adenopathy.   Skin:     General: Skin is warm and dry.      Capillary Refill: Capillary refill takes less than 2 seconds.   Neurological:      Mental Status: She is alert.         Procedures  none         ED Course    Labs Reviewed - No data to display  No results found.      Medical Decision Making  Dental infection: acute illness or injury  Facial swelling: acute illness or injury  Risk  Prescription drug management.  Decision regarding hospitalization.      Exam consistent with dental cause of infection with surrounding swelling and  inflammation affecting the facial tissue. No airway compromise. Will start on oral antibiotic coverage. No indication for labs or imaging at this time. Patient advised on treatment and symptomatic treatment of discomfort. Advised on need for follow-up with dentistry as this will recur as long as the teeth are not intervened on. Acknowledges understanding of treatment and plan and agreeable to discharge with outpatient management at this time.     Final diagnoses:   Dental infection   Facial swelling         ED Disposition  ED Disposition     ED Disposition   Discharge    Condition   Stable    Comment   --             Tammy Jimenez MD  [address]   [place] [id number] [phone]          Dentist               Medication List      New Prescriptions    penicillin v potassium 500 MG tablet  Commonly known as: VEETID  Take 1 tablet by mouth 4 (Four) Times a Day for 10 days.        Stop    benzonatate 200 MG capsule  Commonly known as: TESSALON     ondansetron ODT 8 MG disintegrating tablet  Commonly known as: ZOFRAN-ODT     pantoprazole 40 MG EC tablet  Commonly known as: PROTONIX           Where to Get Your Medications      These medications were sent to [facility] - [place], [place] - [address] [place] [address] - [phone]  - [phone] [address] [address]    Phone: [phone]   · penicillin v potassium 500 MG tablet          Ambrocio Blanco,   04/15/23 1042

## 2023-04-14 ENCOUNTER — PATIENT OUTREACH (OUTPATIENT)
Dept: CASE MANAGEMENT | Facility: OTHER | Age: 28
End: 2023-04-14
Payer: COMMERCIAL

## 2023-04-14 NOTE — OUTREACH NOTE
"AMBULATORY CASE MANAGEMENT NOTE    Name and Relationship of Patient/Support Person: Joe Stocktonaneesh Carrera \"Debi\" - Self    Patient Outreach    Pt seen at [facility] ED 4/10/23 for dental infection and facial swelling. AVS and discharge instructions reviewed. Pt did not seem interested in the call. Pt reports she is taking the abx as prescribed. Educated on importance of taking entire abx. She does not have an appt scheduled with a dentist. Call then either disconnected or pt hung up. Tried calling pt back but no answer, m for pt to return call.    Obdulia MERIDA  Ambulatory Case Management    4/14/2023, 12:43 EDT  "

## 2023-04-17 ENCOUNTER — APPOINTMENT (OUTPATIENT)
Dept: ULTRASOUND IMAGING | Facility: HOSPITAL | Age: 28
End: 2023-04-17
Payer: COMMERCIAL

## 2023-04-17 ENCOUNTER — HOSPITAL ENCOUNTER (EMERGENCY)
Facility: HOSPITAL | Age: 28
Discharge: HOME | End: 2023-04-18
Attending: EMERGENCY MEDICINE | Admitting: EMERGENCY MEDICINE
Payer: COMMERCIAL
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
## 2023-04-17 LAB
ALBUMIN SERPL-MCNC: 4.4 G/DL (ref 3.5–5.2)
ALBUMIN/GLOB SERPL: 1.5 G/DL
ALP SERPL-CCNC: 79 U/L (ref 39–117)
ALT SERPL W P-5'-P-CCNC: 14 U/L (ref 1–33)
AMPHET+METHAMPHET UR QL: POSITIVE
AMPHETAMINES UR QL: POSITIVE
ANION GAP SERPL CALCULATED.3IONS-SCNC: 10 MMOL/L (ref 5–15)
AST SERPL-CCNC: 17 U/L (ref 1–32)
BARBITURATES UR QL SCN: NEGATIVE
BASOPHILS # BLD AUTO: 0.04 10*3/MM3 (ref 0–0.2)
BASOPHILS NFR BLD AUTO: 0.5 % (ref 0–1.5)
BENZODIAZ UR QL SCN: NEGATIVE
BILIRUB SERPL-MCNC: 0.2 MG/DL (ref 0–1.2)
BILIRUB UR QL STRIP: NEGATIVE
BUN SERPL-MCNC: 9 MG/DL (ref 6–20)
BUN/CREAT SERPL: 9.9 (ref 7–25)
BUPRENORPHINE SERPL-MCNC: NEGATIVE NG/ML
CALCIUM SPEC-SCNC: 9.8 MG/DL (ref 8.6–10.5)
CANNABINOIDS SERPL QL: NEGATIVE
CHLORIDE SERPL-SCNC: 102 MMOL/L (ref 98–107)
CLARITY UR: ABNORMAL
CO2 SERPL-SCNC: 29 MMOL/L (ref 22–29)
COCAINE UR QL: NEGATIVE
COLOR UR: YELLOW
CREAT SERPL-MCNC: 0.91 MG/DL (ref 0.57–1)
DEPRECATED RDW RBC AUTO: 36.1 FL (ref 37–54)
EGFRCR SERPLBLD CKD-EPI 2021: 87.8 ML/MIN/1.73
EOSINOPHIL # BLD AUTO: 0.13 10*3/MM3 (ref 0–0.4)
EOSINOPHIL NFR BLD AUTO: 1.7 % (ref 0.3–6.2)
ERYTHROCYTE [DISTWIDTH] IN BLOOD BY AUTOMATED COUNT: 11.9 % (ref 12.3–15.4)
GLOBULIN UR ELPH-MCNC: 2.9 GM/DL
GLUCOSE SERPL-MCNC: 82 MG/DL (ref 65–99)
GLUCOSE UR STRIP-MCNC: NEGATIVE MG/DL
HCT VFR BLD AUTO: 41.3 % (ref 34–46.6)
HGB BLD-MCNC: 13.5 G/DL (ref 12–15.9)
HGB UR QL STRIP.AUTO: NEGATIVE
HOLD SPECIMEN: NORMAL
HOLD SPECIMEN: NORMAL
IMM GRANULOCYTES # BLD AUTO: 0.02 10*3/MM3 (ref 0–0.05)
IMM GRANULOCYTES NFR BLD AUTO: 0.3 % (ref 0–0.5)
KETONES UR QL STRIP: NEGATIVE
LEUKOCYTE ESTERASE UR QL STRIP.AUTO: NEGATIVE
LYMPHOCYTES # BLD AUTO: 3.54 10*3/MM3 (ref 0.7–3.1)
LYMPHOCYTES NFR BLD AUTO: 46.2 % (ref 19.6–45.3)
MCH RBC QN AUTO: 27.6 PG (ref 26.6–33)
MCHC RBC AUTO-ENTMCNC: 32.7 G/DL (ref 31.5–35.7)
MCV RBC AUTO: 84.5 FL (ref 79–97)
METHADONE UR QL SCN: NEGATIVE
MONOCYTES # BLD AUTO: 0.36 10*3/MM3 (ref 0.1–0.9)
MONOCYTES NFR BLD AUTO: 4.7 % (ref 5–12)
NEUTROPHILS NFR BLD AUTO: 3.57 10*3/MM3 (ref 1.7–7)
NEUTROPHILS NFR BLD AUTO: 46.6 % (ref 42.7–76)
NITRITE UR QL STRIP: NEGATIVE
NRBC BLD AUTO-RTO: 0 /100 WBC (ref 0–0.2)
OPIATES UR QL: NEGATIVE
OXYCODONE UR QL SCN: NEGATIVE
PCP UR QL SCN: NEGATIVE
PH UR STRIP.AUTO: 5.5 [PH] (ref 5–9)
PLATELET # BLD AUTO: 251 10*3/MM3 (ref 140–450)
PMV BLD AUTO: 10.1 FL (ref 6–12)
POTASSIUM SERPL-SCNC: 3.8 MMOL/L (ref 3.5–5.2)
PROPOXYPH UR QL: NEGATIVE
PROT SERPL-MCNC: 7.3 G/DL (ref 6–8.5)
PROT UR QL STRIP: NEGATIVE
RBC # BLD AUTO: 4.89 10*6/MM3 (ref 3.77–5.28)
SODIUM SERPL-SCNC: 141 MMOL/L (ref 136–145)
SP GR UR STRIP: 1.02 (ref 1–1.03)
TRICYCLICS UR QL SCN: NEGATIVE
UROBILINOGEN UR QL STRIP: ABNORMAL
WBC NRBC COR # BLD: 7.66 10*3/MM3 (ref 3.4–10.8)
WHOLE BLOOD HOLD COAG: NORMAL
WHOLE BLOOD HOLD SPECIMEN: NORMAL

## 2023-04-17 PROCEDURE — 80074 ACUTE HEPATITIS PANEL: CPT | Performed by: EMERGENCY MEDICINE

## 2023-04-17 PROCEDURE — 99283 EMERGENCY DEPT VISIT LOW MDM: CPT

## 2023-04-17 PROCEDURE — 96374 THER/PROPH/DIAG INJ IV PUSH: CPT

## 2023-04-17 PROCEDURE — 80306 DRUG TEST PRSMV INSTRMNT: CPT | Performed by: EMERGENCY MEDICINE

## 2023-04-17 PROCEDURE — 80053 COMPREHEN METABOLIC PANEL: CPT | Performed by: EMERGENCY MEDICINE

## 2023-04-17 PROCEDURE — 76857 US EXAM PELVIC LIMITED: CPT

## 2023-04-17 PROCEDURE — 87491 CHLMYD TRACH DNA AMP PROBE: CPT | Performed by: EMERGENCY MEDICINE

## 2023-04-17 PROCEDURE — 87661 TRICHOMONAS VAGINALIS AMPLIF: CPT | Performed by: EMERGENCY MEDICINE

## 2023-04-17 PROCEDURE — G0432 EIA HIV-1/HIV-2 SCREEN: HCPCS | Performed by: EMERGENCY MEDICINE

## 2023-04-17 PROCEDURE — 81003 URINALYSIS AUTO W/O SCOPE: CPT | Performed by: EMERGENCY MEDICINE

## 2023-04-17 PROCEDURE — 87591 N.GONORRHOEAE DNA AMP PROB: CPT | Performed by: EMERGENCY MEDICINE

## 2023-04-17 PROCEDURE — 85025 COMPLETE CBC W/AUTO DIFF WBC: CPT | Performed by: EMERGENCY MEDICINE

## 2023-04-17 PROCEDURE — 25010000002 KETOROLAC TROMETHAMINE PER 15 MG: Performed by: EMERGENCY MEDICINE

## 2023-04-17 RX ORDER — KETOROLAC TROMETHAMINE 30 MG/ML
30 INJECTION, SOLUTION INTRAMUSCULAR; INTRAVENOUS ONCE
Status: COMPLETED | OUTPATIENT
Start: 2023-04-17 | End: 2023-04-17

## 2023-04-17 RX ORDER — SODIUM CHLORIDE 0.9 % (FLUSH) 0.9 %
10 SYRINGE (ML) INJECTION AS NEEDED
Status: DISCONTINUED | OUTPATIENT
Start: 2023-04-17 | End: 2023-04-18 | Stop reason: HOSPADM

## 2023-04-17 RX ADMIN — KETOROLAC TROMETHAMINE 30 MG: 30 INJECTION, SOLUTION INTRAMUSCULAR; INTRAVENOUS at 22:44

## 2023-04-18 VITALS
OXYGEN SATURATION: 99 % | HEART RATE: 70 BPM | TEMPERATURE: 98.4 F | HEIGHT: 67 IN | RESPIRATION RATE: 18 BRPM | SYSTOLIC BLOOD PRESSURE: 130 MMHG | WEIGHT: 166 LBS | DIASTOLIC BLOOD PRESSURE: 63 MMHG | BODY MASS INDEX: 26.06 KG/M2

## 2023-04-18 LAB
C TRACH RRNA CVX QL NAA+PROBE: NEGATIVE
HAV IGM SERPL QL IA: NORMAL
HBV CORE IGM SERPL QL IA: NORMAL
HBV SURFACE AG SERPL QL IA: NORMAL
HCV AB SER DONR QL: NORMAL
HIV1+2 AB SER QL: NORMAL
N GONORRHOEA RRNA SPEC QL NAA+PROBE: NEGATIVE
TRICHOMONAS VAGINALIS PCR: NEGATIVE

## 2023-04-18 PROCEDURE — 96372 THER/PROPH/DIAG INJ SC/IM: CPT

## 2023-04-18 PROCEDURE — 25010000002 CEFTRIAXONE PER 250 MG: Performed by: EMERGENCY MEDICINE

## 2023-04-18 RX ORDER — AZITHROMYCIN 250 MG/1
1000 TABLET, FILM COATED ORAL ONCE
Status: COMPLETED | OUTPATIENT
Start: 2023-04-18 | End: 2023-04-18

## 2023-04-18 RX ADMIN — AZITHROMYCIN MONOHYDRATE 1000 MG: 250 TABLET ORAL at 01:00

## 2023-04-18 RX ADMIN — LIDOCAINE HYDROCHLORIDE 500 MG: 10 INJECTION, SOLUTION EPIDURAL; INFILTRATION; INTRACAUDAL; PERINEURAL at 01:01
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
## 2023-04-18 NOTE — DISCHARGE INSTRUCTIONS
Please return with new or worsening symptoms.  Follow-up with primary care for additional results and retesting in 6 weeks.

## 2023-04-25 ENCOUNTER — OFFICE VISIT (OUTPATIENT)
Dept: FAMILY MEDICINE CLINIC | Facility: CLINIC | Age: 28
End: 2023-04-25
Payer: COMMERCIAL

## 2023-04-25 VITALS
HEIGHT: 67 IN | BODY MASS INDEX: 26.42 KG/M2 | SYSTOLIC BLOOD PRESSURE: 128 MMHG | HEART RATE: 76 BPM | WEIGHT: 168.31 LBS | DIASTOLIC BLOOD PRESSURE: 78 MMHG | OXYGEN SATURATION: 100 % | TEMPERATURE: 96.8 F

## 2023-04-25 DIAGNOSIS — M54.42 MIDLINE LOW BACK PAIN WITH BILATERAL SCIATICA, UNSPECIFIED CHRONICITY: ICD-10-CM

## 2023-04-25 DIAGNOSIS — Q65.89 HIP DYSPLASIA: Primary | ICD-10-CM

## 2023-04-25 DIAGNOSIS — M54.41 MIDLINE LOW BACK PAIN WITH BILATERAL SCIATICA, UNSPECIFIED CHRONICITY: ICD-10-CM

## 2023-04-25 RX ORDER — MELOXICAM 5 MG/1
5 CAPSULE ORAL DAILY
Qty: 30 CAPSULE | Refills: 0 | Status: SHIPPED | OUTPATIENT
Start: 2023-04-25

## 2023-04-26 NOTE — PROGRESS NOTES
Family Medicine Residency  Tammy Jimenez MD    Subjective:     Dorina Stockton is a 28 y.o. female with hx of bilateral hip dysplasia who presents for low back pain. Pain is sharp radiates to the b/l leg stops behind the knee. Right is worse than left. Patient denies numbness/tingling, sudden loss of bowel or urine. Interested in PT.     The following portions of the patient's history were reviewed and updated as appropriate: allergies, current medications, past family history, past medical history, past social history, past surgical history and problem list.    Past Medical Hx:  Past Medical History:   Diagnosis Date   • Abdominal pain    • Acute bronchitis    • Bipolar 1 disorder    • Corneal abrasion    • Cyst of left ovary    • Depression    • Dietary counseling and surveillance    • Encounter for  visit    • General counseling and advice on female contraception     would like a gary   • Gestational diabetes mellitus     only during pregnancy   • Hip dysplasia, congenital     bilateral   • Manic depression    • Multiple personalities    • Nausea    • Pregnancy test positive     serum   • Tension type headache    • Viral gastroenteritis        Past Surgical Hx:  Past Surgical History:   Procedure Laterality Date   •  SECTION     • COLONOSCOPY N/A 2022    Procedure: COLONOSCOPY;  Surgeon: Johny España MD;  Location: [facility] ENDOSCOPY;  Service: Gastroenterology;  Laterality: N/A;   • EAR TUBES     • ENDOSCOPY N/A 2022    Procedure: ESOPHAGOGASTRODUODENOSCOPY;  Surgeon: Johny España MD;  Location: [facility] ENDOSCOPY;  Service: Gastroenterology;  Laterality: N/A;   • HIP SURGERY Left    • [place] HYSTEROSCOPY ENDOMETRIAL ABLATION N/A 2017    Procedure: HYSTEROSCOPY WITH ENDOMETRIAL ABLATION;  Surgeon: Dominik Toribio MD;  Location: [facility] OR;  Service: Obstetrics/Gynecology   • TONSILLECTOMY      T & A   • TOTAL LAPAROSCOPIC HYSTERECTOMY SALPINGO OOPHORECTOMY  Bilateral 2020    Procedure: TOTAL LAPAROSCOPIC HYSTERECTOMY BILATERAL salpingectomy, cystoscopy, Left Oophorectomy;  Surgeon: Ricardo Orellana DO;  Location: [facility];  Service: Obstetrics/Gynecology;  Laterality: Bilateral;   • TUBAL COAGULATION LAPAROSCOPIC Bilateral 2017    Procedure: EXAMINATION UNDER ANESTHESA, LAPAROSCOPIC TUBAL FULGURATION, DIAGNOISTIC HYSTEROSCOPY WITH FRACTIONAL DILATION AND CURETTAGE ENDOMETRAL ABLATION ;  Surgeon: Dominik Toribio MD;  Location: [facility];  Service:        Current Meds:    Current Outpatient Medications:   •  Meloxicam 5 MG capsule, Take 5 mg by mouth Daily., Disp: 30 capsule, Rfl: 0    Allergies:  Allergies   Allergen Reactions   • Strawberry Hives   • Sulfa Antibiotics Hives       Family Hx:  Family History   Problem Relation Age of Onset   • Other Father         carbon monoxide poisoning   • Cancer Other    • Diabetes Other    • Hypertension Other    • Stroke Other    • Cataracts Maternal Grandmother    • Cataracts Maternal Grandfather    • Cataracts Paternal Grandmother    • Cataracts Paternal Grandfather         Social History:  Social History     Socioeconomic History   • Marital status:    Tobacco Use   • Smoking status: Former     Packs/day: 0.25     Years: 12.00     Pack years: 3.00     Types: Cigarettes     Quit date: 2023     Years since quittin.2   • Smokeless tobacco: Never   Vaping Use   • Vaping Use: Every day   • Substances: Nicotine, Flavoring   • Devices: Disposable   Substance and Sexual Activity   • Alcohol use: No   • Drug use: Not Currently     Types: Marijuana   • Sexual activity: Yes     Partners: Male     Birth control/protection: None, Surgical       Review of Systems  Review of Systems   Respiratory: Negative.    Cardiovascular: Negative.    Gastrointestinal: Negative.    Musculoskeletal: Positive for arthralgias and back pain.       Objective:     /78   Pulse 76   Temp 96.8 °F (36 °C)   Ht 170.2  "cm (67\")   Wt 76.3 kg (168 lb 5 oz)   LMP 11/02/2020   SpO2 100%   BMI 26.36 kg/m²   Physical Exam  Cardiovascular:      Rate and Rhythm: Normal rate and regular rhythm.      Pulses: Normal pulses.      Heart sounds: Normal heart sounds. No murmur heard.  Pulmonary:      Effort: Pulmonary effort is normal. No respiratory distress.      Breath sounds: Normal breath sounds.   Abdominal:      General: Bowel sounds are normal.      Palpations: Abdomen is soft.      Tenderness: There is no abdominal tenderness.   Musculoskeletal:      Right knee: Decreased range of motion.      Comments: Leg raise test is positive on the right, negative on the left.           Assessment/Plan:     Diagnoses and all orders for this visit:    1. Hip dysplasia (Primary)  -     XR Hips Bilateral With or Without Pelvis 5 View  -     Ambulatory Referral to Physical Therapy Evaluate and treat  -     Ambulatory Referral to Orthopedic Surgery  -     Meloxicam 5 MG capsule; Take 5 mg by mouth Daily.  Dispense: 30 capsule; Refill: 0    2. Midline low back pain with bilateral sciatica, unspecified chronicity  -     Ambulatory Referral to Physical Therapy Evaluate and treat  -     XR Spine Lumbar 4+ View  -     Meloxicam 5 MG capsule; Take 5 mg by mouth Daily.  Dispense: 30 capsule; Refill: 0    will obtain imaging, refer to PT. Will give antiinflammatory then will reassess response. Will refer to ortho for treatment options.       Depression screening: Up to date; last screen 10/3/2022     Follow-up:     Return in about 4 weeks (around 5/23/2023).    Preventative:  Health Maintenance   Topic Date Due   • ANNUAL PHYSICAL  Never done   • COVID-19 Vaccine (3 - Booster for Moderna series) 11/10/2021   • PAP SMEAR  02/26/2023   • INFLUENZA VACCINE  08/01/2023   • TDAP/TD VACCINES (3 - Td or Tdap) 02/10/2025   • COLORECTAL CANCER SCREENING  11/02/2044   • HEPATITIS C SCREENING  Completed   • Pneumococcal Vaccine 0-64  Aged Out         Alcohol use:  " reports no history of alcohol use.  Nicotine status  reports that she quit smoking about 2 months ago. Her smoking use included cigarettes. She has a 3.00 pack-year smoking history. She has never used smokeless tobacco.     Goals    None         RISK SCORE: 3      This document has been electronically signed by Tammy Jimenez MD on April 26, 2023 14:31 CDT    Answers for HPI/ROS submitted by the patient on 4/25/2023  Please describe your symptoms.: Pain in hips mainly right hip. Wanting referral for physical therpist.  Have you had these symptoms before?: Yes  How long have you been having these symptoms?: Greater than 2 weeks  Please list any medications you are currently taking for this condition.: N/a  Please describe any probable cause for these symptoms. : Hip dysplasia and deteriorating hips  What is the primary reason for your visit?: Other

## 2023-04-27 ENCOUNTER — HOSPITAL ENCOUNTER (OUTPATIENT)
Dept: PHYSICAL THERAPY | Facility: HOSPITAL | Age: 28
Setting detail: THERAPIES SERIES
Discharge: HOME | End: 2023-04-27
Payer: COMMERCIAL

## 2023-04-27 DIAGNOSIS — M54.50 LOW BACK PAIN, UNSPECIFIED BACK PAIN LATERALITY, UNSPECIFIED CHRONICITY, UNSPECIFIED WHETHER SCIATICA PRESENT: ICD-10-CM

## 2023-04-27 DIAGNOSIS — Q65.89 HIP DYSPLASIA: Primary | ICD-10-CM

## 2023-04-27 PROCEDURE — 97162 PT EVAL MOD COMPLEX 30 MIN: CPT | Performed by: PHYSICAL THERAPIST

## 2023-04-28 ENCOUNTER — TELEPHONE (OUTPATIENT)
Dept: FAMILY MEDICINE CLINIC | Facility: CLINIC | Age: 28
End: 2023-04-28
Payer: COMMERCIAL

## 2023-04-28 NOTE — TELEPHONE ENCOUNTER
Patient called wanting to speak to someone about her prescription sent to [facility].    Her#[phone]

## 2023-04-28 NOTE — THERAPY EVALUATION
Outpatient Physical Therapy Ortho Initial Evaluation  [facility]     Patient Name: Dorina Stockton  : 1995  MRN: [id number]  Today's Date: 2023      Visit Date: 2023  Attendance:   (tbd approved)  Subjective % Improvement: n/a  Recert Date: 23  MD appointment: tbd    Therapy Diagnosis: LBP, Right hip pain    Patient Active Problem List   Diagnosis   • Menorrhagia with regular cycle   • Unwanted fertility   • Abnormal uterine bleeding (AUB)   • Pelvic pain   • Female dyspareunia   • Left ovarian cyst   • History of endometrial ablation   • Status post laparoscopic hysterectomy   • Generalized abdominal pain   • Nausea   • Change in bowel habits   • Diarrhea        Past Medical History:   Diagnosis Date   • Abdominal pain    • Acute bronchitis    • Bipolar 1 disorder    • Corneal abrasion    • Cyst of left ovary    • Depression    • Dietary counseling and surveillance    • Encounter for  visit    • General counseling and advice on female contraception     would like a gary   • Gestational diabetes mellitus     only during pregnancy   • Hip dysplasia, congenital     bilateral   • Manic depression    • Multiple personalities    • Nausea    • Pregnancy test positive     serum   • Tension type headache    • Viral gastroenteritis         Past Surgical History:   Procedure Laterality Date   •  SECTION     • COLONOSCOPY N/A 2022    Procedure: COLONOSCOPY;  Surgeon: Johny España MD;  Location: [facility] ENDOSCOPY;  Service: Gastroenterology;  Laterality: N/A;   • EAR TUBES     • ENDOSCOPY N/A 2022    Procedure: ESOPHAGOGASTRODUODENOSCOPY;  Surgeon: Johny España MD;  Location: [facility] ENDOSCOPY;  Service: Gastroenterology;  Laterality: N/A;   • HIP SURGERY Left    • [place] HYSTEROSCOPY ENDOMETRIAL ABLATION N/A 2017    Procedure: HYSTEROSCOPY WITH ENDOMETRIAL ABLATION;  Surgeon: Dominik Toribio MD;  Location: [facility] OR;  Service:  Obstetrics/Gynecology   • TONSILLECTOMY      T & A   • TOTAL LAPAROSCOPIC HYSTERECTOMY SALPINGO OOPHORECTOMY Bilateral 11/30/2020    Procedure: TOTAL LAPAROSCOPIC HYSTERECTOMY BILATERAL salpingectomy, cystoscopy, Left Oophorectomy;  Surgeon: Ricardo Orellana DO;  Location: [facility];  Service: Obstetrics/Gynecology;  Laterality: Bilateral;   • TUBAL COAGULATION LAPAROSCOPIC Bilateral 12/20/2017    Procedure: EXAMINATION UNDER ANESTHESA, LAPAROSCOPIC TUBAL FULGURATION, DIAGNOISTIC HYSTEROSCOPY WITH FRACTIONAL DILATION AND CURETTAGE ENDOMETRAL ABLATION ;  Surgeon: Dominik Toribio MD;  Location: [facility];  Service:        Visit Dx:     ICD-10-CM ICD-9-CM   1. Hip dysplasia  Q65.89 755.63   2. Low back pain, unspecified back pain laterality, unspecified chronicity, unspecified whether sciatica present  M54.50 724.2          Patient History     Row Name 04/27/23 0930             History    Date Current Problem(s) Began --  chronic  -BB      Brief Description of Current Complaint Present today with right hip pain that is constant and is a stabbing/aching type pain. With walking/standing back starts hurting and is achy type but improves with rest. Reports flame type of pain with walking,standing and moving of the hip that shots to right knee but not distally. Hx of a left hip sx when 3 years old. With increased pain reports right foot goes numb. No issues with left leg.  -BB      Patient/Caregiver Goals Relieve pain;Improve mobility  -BB      Patient's Rating of General Health Good  -BB      Hand Dominance right-handed  -BB      Occupation/sports/leisure activities does not work  -BB         Pain     Pain Location Hip  -BB      Pain at Present 8  -BB            User Key  (r) = Recorded By, (t) = Taken By, (c) = Cosigned By    Initials Name Provider Type    BB Sonya Gomez, PT DPT Physical Therapist                 PT Ortho     Row Name 04/27/23 5213       Subjective Comments    Subjective Comments see pt hx   -BB       Precautions and Contraindications    Precautions/Limitations no known precautions/limitations  -BB       Subjective Pain    Pre-Treatment Pain Level 8  hip pain  -BB    Post-Treatment Pain Level 8  -BB       Special Tests/Palpation    Special Tests/Palpation Hip  increased pain with sacral base mobility slight improvement with coccyx. Is noted to have a flexed coccyx this date with pain. Pain with SI closing and improved with gapping  -BB       Hip/Thigh Palpation    Hip/Thigh Palpation? Yes  -BB    Anterior Capsule Tender  -BB    Iliopsoas Tender  -BB    Gluteus Bennie Tender;Guarded/taut  -BB    Gluteus Medius Tender  -BB    Gluteus Minimus Tender  -BB    Piriformis Tender;Guarded/taut  -BB    Quadriceps --  proximal>distal  -BB    ITB Guarded/taut  -BB       Hip Accessory Motions    Hip Accessory Motions Tested? --  unable to tolerate well. With leg extended able to tolerate IR/ER of about 20 degrees, with knee/hip flexed limited hip ER/IR to about 15 degrees. Hip flexion in supine of less than 90 degrees with pain  -BB       Leg Length Test    True Equal  measured this date appears grossly WNL. Does appear to have a pelvic upslip/rotation  -BB       General ROM    GENERAL ROM COMMENTS flexion: AROM 60, PROM 90, Right ER/IR: 30 degrees  -BB       MMT (Manual Muscle Testing)    General MMT Comments unable to tolerate MMT of right hip due to pain and ROM limitation  -BB       Sensation    Additional Comments reports distal to knee feeling knee. Reports pain shoots to knee. No foot drop.  -BB       Balance Skills Training    SLS unable to tolerate RLE, LLE is WFL  -BB          User Key  (r) = Recorded By, (t) = Taken By, (c) = Cosigned By    Initials Name Provider Type    Sonya Samuel PT DPT Physical Therapist                                   PT OP Goals     Row Name 04/27/23 0901          PT Short Term Goals    STG Date to Achieve 05/19/23  -BB     STG 1 hip flexion of 115 degrees or better AROM  or PROM  -BB     STG 2 Hip extension of 0 degrees or better for improved gait mechanics  -BB     STG 3 right hip MMT of 3+/5  -BB        Long Term Goals    LTG Date to Achieve 06/09/23  -BB     LTG 1 hip MMT right flexion 4/5  -BB     LTG 2 ambulate with less trunk rotation and improved hip flexion/extension  -BB        Time Calculation    PT Goal Re-Cert Due Date 05/18/23  -BB           User Key  (r) = Recorded By, (t) = Taken By, (c) = Cosigned By    Initials Name Provider Type    Sonya Samuel PT DPT Physical Therapist                 PT Assessment/Plan     Row Name 04/27/23 9416          PT Assessment    Functional Limitations Impaired gait;Limitations in functional capacity and performance;Performance in leisure activities;Impaired locomotion;Decreased safety during functional activities;Limitations in community activities  -BB     Impairments Balance;Gait;Impaired muscle endurance;Range of motion;Muscle strength;Pain;Poor body mechanics;Posture  -BB     Assessment Comments Patient presents today with chronic history of hip difficulties. Patients ROM of hip is decreased significantly altering gait mechanics and transfers and leading to increased lumbar rotation to compensate for hip ROM limitation leading to increased stress on lumbar spine. Patient could benefit from skilled PT to hip right hip ROM and functional mobility as able to decrease load and pain. Encouraged to use a 1-2 point increase as the limit for tolerance to activity prior to resting. Patient is unable to bear full wt on RLE due to pain, strength is limited to pain and ROM.  -BB     Rehab Potential Good  -BB     Patient/caregiver participated in establishment of treatment plan and goals Yes  -BB     Patient would benefit from skilled therapy intervention Yes  -BB        PT Plan    PT Frequency 2x/week  -BB     Predicted Duration of Therapy Intervention (PT) 6 weeks  -BB     Planned CPT's? PT EVAL MOD COMPLELITY: 62009;PT RE-EVAL: 81789;PT  THER PROC EA 15 MIN: 77995;PT THER ACT EA 15 MIN: 14016;PT MANUAL THERAPY EA 15 MIN: 86592;PT GAIT TRAINING EA 15 MIN: 83385;PT AQUATIC THERAPY EA 15 MIN: 74062;PT ELECTRICAL STIM UNATTEND: ;PT THER SUPP EA 15 MIN  -BB     PT Plan Comments discuss with patient possible aquatic therapy, stretching, strength, gait training, balance, manual PRN, heat/ice  -BB           User Key  (r) = Recorded By, (t) = Taken By, (c) = Cosigned By    Initials Name Provider Type    Sonya Samuel PT DPT Physical Therapist                   OP Exercises     Row Name 04/27/23 0930             Subjective Comments    Subjective Comments see pt hx  -BB         Subjective Pain    Able to rate subjective pain? yes  -BB      Pre-Treatment Pain Level 8  hip pain  -BB      Post-Treatment Pain Level 8  -BB         Exercise 1    Exercise Name 1 eval/poc  -BB            User Key  (r) = Recorded By, (t) = Taken By, (c) = Cosigned By    Initials Name Provider Type    Sonya Samuel PT DPT Physical Therapist                                    Addendum to add subjective information about sacral/SIJ.     Time Calculation:     Start Time: 0930  Stop Time: 1015  Time Calculation (min): 45 min     Therapy Charges for Today     Code Description Service Date Service Provider Modifiers Qty    [id number] HC PT EVAL MOD COMPLEXITY 3 4/27/2023 Sonya Gomez PT DPT GP 1                    HELEN BergerT  4/28/2023

## 2023-05-02 ENCOUNTER — TELEPHONE (OUTPATIENT)
Dept: FAMILY MEDICINE CLINIC | Facility: CLINIC | Age: 28
End: 2023-05-02
Payer: COMMERCIAL

## 2023-05-02 DIAGNOSIS — M54.41 MIDLINE LOW BACK PAIN WITH BILATERAL SCIATICA, UNSPECIFIED CHRONICITY: Primary | ICD-10-CM

## 2023-05-02 DIAGNOSIS — M54.42 MIDLINE LOW BACK PAIN WITH BILATERAL SCIATICA, UNSPECIFIED CHRONICITY: Primary | ICD-10-CM

## 2023-05-02 RX ORDER — MELOXICAM 7.5 MG/1
7.5 TABLET ORAL DAILY
Qty: 30 TABLET | Refills: 0 | Status: SHIPPED | OUTPATIENT
Start: 2023-05-02

## 2023-05-03 ENCOUNTER — HOSPITAL ENCOUNTER (OUTPATIENT)
Dept: PHYSICAL THERAPY | Facility: HOSPITAL | Age: 28
Setting detail: THERAPIES SERIES
Discharge: HOME | End: 2023-05-03
Payer: COMMERCIAL

## 2023-05-03 DIAGNOSIS — Q65.89 HIP DYSPLASIA: Primary | ICD-10-CM

## 2023-05-03 DIAGNOSIS — M54.50 LOW BACK PAIN, UNSPECIFIED BACK PAIN LATERALITY, UNSPECIFIED CHRONICITY, UNSPECIFIED WHETHER SCIATICA PRESENT: ICD-10-CM

## 2023-05-03 PROCEDURE — 97110 THERAPEUTIC EXERCISES: CPT

## 2023-05-03 NOTE — THERAPY TREATMENT NOTE
Outpatient Physical Therapy Ortho Treatment Note  [facility]     Patient Name: Dorina Stockton  : 1995  MRN: [id number]  Today's Date: 5/3/2023      Visit Date: 2023   Attendance:  ()  Subjective improvement: n/a  Recert:23  MD Appointment: TBD      Visit Dx:    ICD-10-CM ICD-9-CM   1. Hip dysplasia  Q65.89 755.63   2. Low back pain, unspecified back pain laterality, unspecified chronicity, unspecified whether sciatica present  M54.50 724.2       Patient Active Problem List   Diagnosis   • Menorrhagia with regular cycle   • Unwanted fertility   • Abnormal uterine bleeding (AUB)   • Pelvic pain   • Female dyspareunia   • Left ovarian cyst   • History of endometrial ablation   • Status post laparoscopic hysterectomy   • Generalized abdominal pain   • Nausea   • Change in bowel habits   • Diarrhea        Past Medical History:   Diagnosis Date   • Abdominal pain    • Acute bronchitis    • Bipolar 1 disorder    • Corneal abrasion    • Cyst of left ovary    • Depression    • Dietary counseling and surveillance    • Encounter for  visit    • General counseling and advice on female contraception     would like a gary   • Gestational diabetes mellitus     only during pregnancy   • Hip dysplasia, congenital     bilateral   • Manic depression    • Multiple personalities    • Nausea    • Pregnancy test positive     serum   • Tension type headache    • Viral gastroenteritis         Past Surgical History:   Procedure Laterality Date   •  SECTION     • COLONOSCOPY N/A 2022    Procedure: COLONOSCOPY;  Surgeon: Johny España MD;  Location: [facility] ENDOSCOPY;  Service: Gastroenterology;  Laterality: N/A;   • EAR TUBES     • ENDOSCOPY N/A 2022    Procedure: ESOPHAGOGASTRODUODENOSCOPY;  Surgeon: Johny España MD;  Location: [facility] ENDOSCOPY;  Service: Gastroenterology;  Laterality: N/A;   • HIP SURGERY Left    • [place] HYSTEROSCOPY ENDOMETRIAL ABLATION N/A  12/20/2017    Procedure: HYSTEROSCOPY WITH ENDOMETRIAL ABLATION;  Surgeon: Dominik Toribio MD;  Location: [facility];  Service: Obstetrics/Gynecology   • TONSILLECTOMY      T & A   • TOTAL LAPAROSCOPIC HYSTERECTOMY SALPINGO OOPHORECTOMY Bilateral 11/30/2020    Procedure: TOTAL LAPAROSCOPIC HYSTERECTOMY BILATERAL salpingectomy, cystoscopy, Left Oophorectomy;  Surgeon: Ricardo Orellana DO;  Location: [facility];  Service: Obstetrics/Gynecology;  Laterality: Bilateral;   • TUBAL COAGULATION LAPAROSCOPIC Bilateral 12/20/2017    Procedure: EXAMINATION UNDER ANESTHESA, LAPAROSCOPIC TUBAL FULGURATION, DIAGNOISTIC HYSTEROSCOPY WITH FRACTIONAL DILATION AND CURETTAGE ENDOMETRAL ABLATION ;  Surgeon: Dominik Toribio MD;  Location: [facility];  Service:         PT Ortho     Row Name 05/03/23 1100       Precautions and Contraindications    Precautions/Limitations no known precautions/limitations  -EM       Subjective Pain    Post-Treatment Pain Level 2  -EM       Posture/Observations    Posture/Observations Comments R pelvic upslip with rotation. Rotation corrected via ME.  -EM          User Key  (r) = Recorded By, (t) = Taken By, (c) = Cosigned By    Initials Name Provider Type    EM Jey Alston, PTA Physical Therapist Assistant                             PT Assessment/Plan     Row Name 05/03/23 1100          PT Assessment    Functional Limitations Impaired gait;Limitations in functional capacity and performance;Performance in leisure activities;Impaired locomotion;Decreased safety during functional activities;Limitations in community activities  -EM     Impairments Balance;Gait;Impaired muscle endurance;Range of motion;Muscle strength;Pain;Poor body mechanics;Posture  -EM     Assessment Comments Tx performed on land this date due to pt forgetting swim gear. Pt ryan tx well with signficant improvement post tx. R Rotation corrected with ME technique. upslip remained  -EM     Rehab Potential Good  -EM      "Patient/caregiver participated in establishment of treatment plan and goals Yes  -EM     Patient would benefit from skilled therapy intervention Yes  -EM        PT Plan    PT Frequency 2x/week  -EM     Predicted Duration of Therapy Intervention (PT) 6 weeks  -EM     PT Plan Comments Aquatics next session. Address pelvic upslip next visit.  -EM           User Key  (r) = Recorded By, (t) = Taken By, (c) = Cosigned By    Initials Name Provider Type    EM Jey Alston PTA Physical Therapist Assistant                   OP Exercises     Row Name 05/03/23 1100             Subjective Comments    Subjective Comments Pt forgot pool gear this date. Pt reports pain and numbness from R L-Spine down ant thigh and numbness R toes  -EM         Subjective Pain    Able to rate subjective pain? yes  -EM      Pre-Treatment Pain Level 7  -EM      Post-Treatment Pain Level 2  -EM         Exercise 1    Exercise Name 1 PRO II-Seat 9-4.0  -EM      Time 1 10'  -EM         Exercise 2    Exercise Name 2 B St Ham S  -EM      Sets 2 3  -EM      Time 2 30\"  -EM         Exercise 3    Exercise Name 3 B Seated Piriformis S  -EM      Sets 3 3  -EM      Time 3 30\"  -EM         Exercise 4    Exercise Name 4 Bridges from Physioball  -EM      Sets 4 1  -EM      Reps 4 20  -EM         Exercise 5    Exercise Name 5 SL Hip Abd: AROM L, AAROM R  -EM      Sets 5 1  -EM      Reps 5 20  -EM         Exercise 6    Exercise Name 6 SL Clamshells: AAROM R, AROM L  -EM      Sets 6 1  -EM      Reps 6 20  -EM         Exercise 7    Exercise Name 7 Manual pelvic alignment  -EM            User Key  (r) = Recorded By, (t) = Taken By, (c) = Cosigned By    Initials Name Provider Type    EM Jey Alston PTA Physical Therapist Assistant                         Manual Rx (last 36 hours)     Manual Treatments     Row Name 05/03/23 1100             Manual Rx 1    Manual Rx 1 Location Pelvic alignment  -EM      Manual Rx 1 Type ME for R rotation  -EM      Manual Rx 1 " Duration 5'  -EM            User Key  (r) = Recorded By, (t) = Taken By, (c) = Cosigned By    Initials Name Provider Type    EM Jey Alston PTA Physical Therapist Assistant                                   Time Calculation:   Start Time: 1103  Stop Time: 1200  Time Calculation (min): 57 min  Total Timed Code Minutes- PT: 57 minute(s)  Therapy Charges for Today     Code Description Service Date Service Provider Modifiers Qty    99167746707 HC PT THER PROC EA 15 MIN 5/3/2023 Jey Alston PTA GP, CQ 4                    Jey Alston PTA  5/3/2023

## 2023-05-04 ENCOUNTER — HOSPITAL ENCOUNTER (OUTPATIENT)
Dept: PHYSICAL THERAPY | Facility: HOSPITAL | Age: 28
Setting detail: THERAPIES SERIES
Discharge: HOME | End: 2023-05-04
Payer: COMMERCIAL

## 2023-05-04 DIAGNOSIS — M54.50 LOW BACK PAIN, UNSPECIFIED BACK PAIN LATERALITY, UNSPECIFIED CHRONICITY, UNSPECIFIED WHETHER SCIATICA PRESENT: ICD-10-CM

## 2023-05-04 DIAGNOSIS — Q65.89 HIP DYSPLASIA: Primary | ICD-10-CM

## 2023-05-04 PROCEDURE — 97113 AQUATIC THERAPY/EXERCISES: CPT | Performed by: PHYSICAL THERAPIST

## 2023-05-04 NOTE — THERAPY TREATMENT NOTE
Outpatient Physical Therapy Ortho Treatment Note  [facility]     Patient Name: Dorina Stockton  : 1995  MRN: [id number]  Today's Date: 2023      Visit Date: 2023  Attendance: 3/3   Subjective improvement: n/a  Recert:23  MD Appointment: TBD  Visit Dx:    ICD-10-CM ICD-9-CM   1. Hip dysplasia  Q65.89 755.63   2. Low back pain, unspecified back pain laterality, unspecified chronicity, unspecified whether sciatica present  M54.50 724.2       Patient Active Problem List   Diagnosis   • Menorrhagia with regular cycle   • Unwanted fertility   • Abnormal uterine bleeding (AUB)   • Pelvic pain   • Female dyspareunia   • Left ovarian cyst   • History of endometrial ablation   • Status post laparoscopic hysterectomy   • Generalized abdominal pain   • Nausea   • Change in bowel habits   • Diarrhea        Past Medical History:   Diagnosis Date   • Abdominal pain    • Acute bronchitis    • Bipolar 1 disorder    • Corneal abrasion    • Cyst of left ovary    • Depression    • Dietary counseling and surveillance    • Encounter for  visit    • General counseling and advice on female contraception     would like a gary   • Gestational diabetes mellitus     only during pregnancy   • Hip dysplasia, congenital     bilateral   • Manic depression    • Multiple personalities    • Nausea    • Pregnancy test positive     serum   • Tension type headache    • Viral gastroenteritis         Past Surgical History:   Procedure Laterality Date   •  SECTION     • COLONOSCOPY N/A 2022    Procedure: COLONOSCOPY;  Surgeon: Johny España MD;  Location: [facility] ENDOSCOPY;  Service: Gastroenterology;  Laterality: N/A;   • EAR TUBES     • ENDOSCOPY N/A 2022    Procedure: ESOPHAGOGASTRODUODENOSCOPY;  Surgeon: Johny España MD;  Location: [facility] ENDOSCOPY;  Service: Gastroenterology;  Laterality: N/A;   • HIP SURGERY Left    • [place] HYSTEROSCOPY ENDOMETRIAL ABLATION N/A 2017     Procedure: HYSTEROSCOPY WITH ENDOMETRIAL ABLATION;  Surgeon: Dominik Toribio MD;  Location: [facility];  Service: Obstetrics/Gynecology   • TONSILLECTOMY      T & A   • TOTAL LAPAROSCOPIC HYSTERECTOMY SALPINGO OOPHORECTOMY Bilateral 11/30/2020    Procedure: TOTAL LAPAROSCOPIC HYSTERECTOMY BILATERAL salpingectomy, cystoscopy, Left Oophorectomy;  Surgeon: Ricardo Orellana DO;  Location: [facility];  Service: Obstetrics/Gynecology;  Laterality: Bilateral;   • TUBAL COAGULATION LAPAROSCOPIC Bilateral 12/20/2017    Procedure: EXAMINATION UNDER ANESTHESA, LAPAROSCOPIC TUBAL FULGURATION, DIAGNOISTIC HYSTEROSCOPY WITH FRACTIONAL DILATION AND CURETTAGE ENDOMETRAL ABLATION ;  Surgeon: Dominik Toribio MD;  Location: [facility];  Service:         PT Ortho     Row Name 05/04/23 1100       Precautions and Contraindications    Precautions/Limitations no known precautions/limitations  -BB       Subjective Pain    Post-Treatment Pain Level 1  -BB       Posture/Observations    Posture/Observations Comments decreased hip extension in gait noted. heel lift (2 layer) for left given with pain as guide for use of lift. improved hip mobility noted in aquatics with exercise  -BB          User Key  (r) = Recorded By, (t) = Taken By, (c) = Cosigned By    Initials Name Provider Type    Sonya Samuel PT DPT Physical Therapist                             PT Assessment/Plan     Row Name 05/04/23 1100          PT Assessment    Functional Limitations Impaired gait;Limitations in functional capacity and performance;Performance in leisure activities;Impaired locomotion;Decreased safety during functional activities;Limitations in community activities  -BB     Impairments Balance;Gait;Impaired muscle endurance;Range of motion;Muscle strength;Pain;Poor body mechanics;Posture  -BB     Assessment Comments Good tolerance to treatment, had reported reduced pain 1-2 while in aquatics. Encuraged ice if sore after initial session  -BB     Rehab  "Potential Good  -BB     Patient/caregiver participated in establishment of treatment plan and goals Yes  -BB     Patient would benefit from skilled therapy intervention Yes  -BB        PT Plan    PT Frequency 2x/week  -BB     Predicted Duration of Therapy Intervention (PT) 6 weeks  -BB     PT Plan Comments continue aquatics  -BB           User Key  (r) = Recorded By, (t) = Taken By, (c) = Cosigned By    Initials Name Provider Type    BB Sonya Gomez, PT DPT Physical Therapist                   OP Exercises     Row Name 05/04/23 1100             Subjective Comments    Subjective Comments Reports a little sore after last session.  -BB         Subjective Pain    Able to rate subjective pain? yes  -BB      Pre-Treatment Pain Level 6  -BB      Post-Treatment Pain Level 1  -BB         Aquatics    Aquatics performed? Yes  -BB         Exercise 1    Exercise Name 1 aqu fwd, bkwd, lateral  -BB      Time 1 5' each way (15')  -BB         Exercise 2    Exercise Name 2 Deep end scissor with noodles  -BB      Time 2 2.5'  -BB         Exercise 3    Exercise Name 3 abd/add deep end noodles  -BB      Time 3 2.5  -BB         Exercise 4    Exercise Name 4 aqua HS curl  -BB      Sets 4 1  -BB      Reps 4 15  -BB      Additional Comments bilateral  -BB         Exercise 5    Exercise Name 5 aqua high knee right  -BB      Sets 5 2  -BB      Reps 5 10  -BB         Exercise 6    Exercise Name 6 bicycle deep end  -BB      Time 6 3'  -BB         Exercise 7    Exercise Name 7 aqua 3 way (flex,abd,ext)  -BB      Sets 7 2  -BB      Reps 7 10 each way  -BB         Exercise 8    Exercise Name 8 aqua minisquat  -BB      Sets 8 2  -BB      Reps 8 10  -BB         Exercise 9    Exercise Name 9 aqu standing hip flexor stretch  -BB      Sets 9 3  -BB      Time 9 10\"  -BB         Exercise 10    Exercise Name 10 standing HS stretch  -BB      Sets 10 3  -BB      Time 10 30\"  -BB         Exercise 11    Exercise Name 11 aqu deep end hang  -BB      Time 11 " 5'  -BB            User Key  (r) = Recorded By, (t) = Taken By, (c) = Cosigned By    Initials Name Provider Type    Sonya Samuel PT DPT Physical Therapist                              PT OP Goals     Row Name 05/04/23 1100          PT Short Term Goals    STG Date to Achieve 05/19/23  -BB     STG 1 hip flexion of 115 degrees or better AROM or PROM  -BB     STG 1 Progress Not Met  -BB     STG 2 Hip extension of 0 degrees or better for improved gait mechanics  -BB     STG 2 Progress Not Met  -BB     STG 3 right hip MMT of 3+/5  -BB     STG 3 Progress Not Met  -BB        Long Term Goals    LTG Date to Achieve 06/09/23  -BB     LTG 1 hip MMT right flexion 4/5  -BB     LTG 1 Progress Not Met  -BB     LTG 2 ambulate with less trunk rotation and improved hip flexion/extension  -BB     LTG 2 Progress Not Met  -BB        Time Calculation    PT Goal Re-Cert Due Date 05/18/23  -BB           User Key  (r) = Recorded By, (t) = Taken By, (c) = Cosigned By    Initials Name Provider Type    Sonya Samuel PT DPT Physical Therapist                               Time Calculation:   Start Time: 1058  Stop Time: 1148  Time Calculation (min): 50 min  Therapy Charges for Today     Code Description Service Date Service Provider Modifiers Qty    [id number] HC PT AQUATIC THERAPY EA 15 MIN 5/4/2023 Sonya Gomez PT DPT GP 3                    Sonya Gomez PT DPT  5/4/2023

## 2023-05-16 ENCOUNTER — APPOINTMENT (OUTPATIENT)
Dept: PHYSICAL THERAPY | Facility: HOSPITAL | Age: 28
End: 2023-05-16

## 2023-06-18 ENCOUNTER — APPOINTMENT (OUTPATIENT)
Dept: GENERAL RADIOLOGY | Facility: HOSPITAL | Age: 28
End: 2023-06-18
Payer: COMMERCIAL

## 2023-06-18 ENCOUNTER — HOSPITAL ENCOUNTER (EMERGENCY)
Facility: HOSPITAL | Age: 28
Discharge: HOME | End: 2023-06-18
Attending: EMERGENCY MEDICINE | Admitting: EMERGENCY MEDICINE
Payer: COMMERCIAL

## 2023-06-18 VITALS
DIASTOLIC BLOOD PRESSURE: 66 MMHG | SYSTOLIC BLOOD PRESSURE: 111 MMHG | HEIGHT: 67 IN | WEIGHT: 165 LBS | TEMPERATURE: 97.9 F | OXYGEN SATURATION: 99 % | RESPIRATION RATE: 18 BRPM | HEART RATE: 60 BPM | BODY MASS INDEX: 25.9 KG/M2

## 2023-06-18 DIAGNOSIS — S20.222A CONTUSION OF LEFT UPPER BACK EXCLUDING SCAPULAR REGION, INITIAL ENCOUNTER: Primary | ICD-10-CM

## 2023-06-18 PROCEDURE — 96374 THER/PROPH/DIAG INJ IV PUSH: CPT

## 2023-06-18 PROCEDURE — 25010000002 KETOROLAC TROMETHAMINE PER 15 MG: Performed by: EMERGENCY MEDICINE

## 2023-06-18 PROCEDURE — 63710000001 ONDANSETRON ODT 4 MG TABLET DISPERSIBLE: Performed by: EMERGENCY MEDICINE

## 2023-06-18 PROCEDURE — 96372 THER/PROPH/DIAG INJ SC/IM: CPT

## 2023-06-18 PROCEDURE — 71101 X-RAY EXAM UNILAT RIBS/CHEST: CPT

## 2023-06-18 PROCEDURE — 99283 EMERGENCY DEPT VISIT LOW MDM: CPT

## 2023-06-18 PROCEDURE — 25010000002 HYDROMORPHONE 1 MG/ML SOLUTION: Performed by: EMERGENCY MEDICINE

## 2023-06-18 RX ORDER — CYCLOBENZAPRINE HCL 10 MG
10 TABLET ORAL 3 TIMES DAILY PRN
Qty: 15 TABLET | Refills: 0 | Status: SHIPPED | OUTPATIENT
Start: 2023-06-18

## 2023-06-18 RX ORDER — LIDOCAINE 50 MG/G
1 PATCH TOPICAL EVERY 24 HOURS
Qty: 15 EACH | Refills: 0 | Status: SHIPPED | OUTPATIENT
Start: 2023-06-18

## 2023-06-18 RX ORDER — KETOROLAC TROMETHAMINE 30 MG/ML
30 INJECTION, SOLUTION INTRAMUSCULAR; INTRAVENOUS ONCE
Status: COMPLETED | OUTPATIENT
Start: 2023-06-18 | End: 2023-06-18

## 2023-06-18 RX ORDER — ONDANSETRON 4 MG/1
4 TABLET, ORALLY DISINTEGRATING ORAL ONCE
Status: COMPLETED | OUTPATIENT
Start: 2023-06-18 | End: 2023-06-18

## 2023-06-18 RX ORDER — IBUPROFEN 600 MG/1
600 TABLET ORAL EVERY 8 HOURS PRN
Qty: 21 TABLET | Refills: 0 | Status: SHIPPED | OUTPATIENT
Start: 2023-06-18

## 2023-06-18 RX ADMIN — KETOROLAC TROMETHAMINE 30 MG: 30 INJECTION, SOLUTION INTRAMUSCULAR at 21:07

## 2023-06-18 RX ADMIN — HYDROMORPHONE HYDROCHLORIDE 1 MG: 1 INJECTION, SOLUTION INTRAMUSCULAR; INTRAVENOUS; SUBCUTANEOUS at 21:07

## 2023-06-18 RX ADMIN — ONDANSETRON 4 MG: 4 TABLET, ORALLY DISINTEGRATING ORAL at 21:07

## 2023-06-19 NOTE — ED PROVIDER NOTES
Subjective   History of Present Illness  Patient presents emergency department complaint of a back injury.  Patient notes working on a vehicle with a ratchet when she was flung backwards and hit the carport falling backwards with her right upper ribs and back.  Patient has some mild bruising in that area.  Patient with pain with deep inspiration and movement of the torso.  Patient notes the symptoms started last night when the accident happened.  No dizziness, no syncope, no head or neck injury.  No abdominal injury.    History provided by:  Patient    Review of Systems   Constitutional: Negative.  Negative for appetite change, chills and fever.   HENT: Negative.  Negative for congestion.    Eyes: Negative.  Negative for photophobia and visual disturbance.   Respiratory: Negative.  Negative for cough, chest tightness and shortness of breath.    Cardiovascular: Negative.  Negative for chest pain and palpitations.   Gastrointestinal: Negative.  Negative for abdominal pain, constipation, diarrhea, nausea and vomiting.   Endocrine: Negative.    Genitourinary:  Positive for flank pain. Negative for decreased urine volume, dysuria and hematuria.   Musculoskeletal:  Positive for back pain. Negative for arthralgias, myalgias, neck pain and neck stiffness.   Skin: Negative.  Negative for pallor.   Neurological: Negative.  Negative for dizziness, syncope, weakness, light-headedness, numbness and headaches.   Psychiatric/Behavioral: Negative.  Negative for confusion and suicidal ideas. The patient is not nervous/anxious.    All other systems reviewed and are negative.    Past Medical History:   Diagnosis Date    Abdominal pain     Acute bronchitis     Bipolar 1 disorder     Corneal abrasion     Cyst of left ovary     Depression     Dietary counseling and surveillance     Encounter for  visit     General counseling and advice on female contraception     would like a gary    Gestational diabetes mellitus     only  during pregnancy    Hip dysplasia, congenital     bilateral    Manic depression     Multiple personalities     Nausea     Pregnancy test positive     serum    Tension type headache     Viral gastroenteritis        Allergies   Allergen Reactions    Strawberry Hives    Sulfa Antibiotics Hives       Past Surgical History:   Procedure Laterality Date     SECTION      COLONOSCOPY N/A 2022    Procedure: COLONOSCOPY;  Surgeon: Johny España MD;  Location: [facility] ENDOSCOPY;  Service: Gastroenterology;  Laterality: N/A;    EAR TUBES      ENDOSCOPY N/A 2022    Procedure: ESOPHAGOGASTRODUODENOSCOPY;  Surgeon: Johny España MD;  Location: [facility] ENDOSCOPY;  Service: Gastroenterology;  Laterality: N/A;    HIP SURGERY Left     MN HYSTEROSCOPY ENDOMETRIAL ABLATION N/A 2017    Procedure: HYSTEROSCOPY WITH ENDOMETRIAL ABLATION;  Surgeon: Dominik Toribio MD;  Location: [facility] OR;  Service: Obstetrics/Gynecology    TONSILLECTOMY      T & A    TOTAL LAPAROSCOPIC HYSTERECTOMY SALPINGO OOPHORECTOMY Bilateral 2020    Procedure: TOTAL LAPAROSCOPIC HYSTERECTOMY BILATERAL salpingectomy, cystoscopy, Left Oophorectomy;  Surgeon: Ricardo Orellana DO;  Location: [facility] OR;  Service: Obstetrics/Gynecology;  Laterality: Bilateral;    TUBAL COAGULATION LAPAROSCOPIC Bilateral 2017    Procedure: EXAMINATION UNDER ANESTHESA, LAPAROSCOPIC TUBAL FULGURATION, DIAGNOISTIC HYSTEROSCOPY WITH FRACTIONAL DILATION AND CURETTAGE ENDOMETRAL ABLATION ;  Surgeon: Dominik Toribio MD;  Location: [facility];  Service:        Family History   Problem Relation Age of Onset    Other Father         carbon monoxide poisoning    Cancer Other     Diabetes Other     Hypertension Other     Stroke Other     Cataracts Maternal Grandmother     Cataracts Maternal Grandfather     Cataracts Paternal Grandmother     Cataracts Paternal Grandfather        Social History     Socioeconomic History    Marital status:     Tobacco Use    Smoking status: Former     Packs/day: 0.25     Years: 12.00     Pack years: 3.00     Types: Cigarettes     Quit date: 2023     Years since quittin.3    Smokeless tobacco: Never   Vaping Use    Vaping Use: Every day    Substances: Nicotine, Flavoring    Devices: Disposable   Substance and Sexual Activity    Alcohol use: No    Drug use: Not Currently     Types: Marijuana    Sexual activity: Yes     Partners: Male     Birth control/protection: None, Surgical           Objective   Physical Exam  Vitals and nursing note reviewed.   Constitutional:       General: She is in acute distress.      Appearance: She is well-developed. She is not ill-appearing or diaphoretic.   HENT:      Head: Normocephalic and atraumatic.      Nose: Nose normal.   Eyes:      General: No scleral icterus.     Conjunctiva/sclera: Conjunctivae normal.   Neck:      Vascular: No JVD.   Cardiovascular:      Rate and Rhythm: Normal rate and regular rhythm.      Heart sounds: Normal heart sounds. No murmur heard.    No friction rub. No gallop.   Pulmonary:      Effort: Pulmonary effort is normal. No respiratory distress.      Breath sounds: No wheezing or rales.   Chest:      Chest wall: No tenderness.   Abdominal:      General: There is no distension.      Palpations: Abdomen is soft. There is no mass.      Tenderness: There is no abdominal tenderness. There is no guarding or rebound.   Musculoskeletal:         General: Swelling and tenderness present. No deformity.      Cervical back: Normal range of motion and neck supple.      Comments: Right upper chest at T3-T6 tenderness to palpation over the para's scapular area.  Some mild ecchymosis in this region.  Tender to touch without crepitus.   Lymphadenopathy:      Cervical: No cervical adenopathy.   Skin:     General: Skin is warm and dry.      Coloration: Skin is not pale.      Findings: No erythema or rash.   Neurological:      Mental Status: She is alert and oriented to  person, place, and time.      Cranial Nerves: No cranial nerve deficit.      Motor: No abnormal muscle tone.   Psychiatric:         Behavior: Behavior normal.         Thought Content: Thought content normal.         Judgment: Judgment normal.       Procedures           ED Course                                         Labs Reviewed - No data to display    XR Ribs Right With PA Chest   Preliminary Result   No acute findings.               Medical Decision Making  No acute fractures noted.  No hemothorax no pneumothorax is noted.  Normal lung tissues.  Ribs do not show any displaced rib fractures.  Patient be discharged with muscle relaxant and anti-inflammatories.    Problems Addressed:  Contusion of left upper back excluding scapular region, initial encounter: complicated acute illness or injury    Amount and/or Complexity of Data Reviewed  Radiology: ordered.    Risk  Prescription drug management.        Final diagnoses:   Contusion of left upper back excluding scapular region, initial encounter       ED Disposition  ED Disposition       ED Disposition   Discharge    Condition   Stable    Comment   --               Tammy Jimenez MD  [address] [place] [address] [phone]    On 6/26/2023  If not improved for further evaluation and care         Medication List        New Prescriptions      cyclobenzaprine 10 MG tablet  Commonly known as: FLEXERIL  Take 1 tablet by mouth 3 (Three) Times a Day As Needed for Muscle Spasms.     ibuprofen 600 MG tablet  Commonly known as: ADVIL,MOTRIN  Take 1 tablet by mouth Every 8 (Eight) Hours As Needed (Pain).     lidocaine 5 %  Commonly known as: LIDODERM  Place 1 patch on the skin as directed by provider Daily. Remove & Discard patch within 12 hours or as directed by MD            Stop      meloxicam 7.5 MG tablet  Commonly known as: MOBIC               Where to Get Your Medications        These medications were sent to [facility] - [place], [place] - [address] [facility]  [place] - [phone] [facility] [phone] [facility] [facility] [address]      Phone: [phone]   cyclobenzaprine 10 MG tablet  ibuprofen 600 MG tablet  lidocaine 5 %            Bernardo Meier MD  06/18/23 9012

## 2023-07-30 ENCOUNTER — HOSPITAL ENCOUNTER (EMERGENCY)
Facility: HOSPITAL | Age: 28
Discharge: LEFT BEFORE DISCHARGE | End: 2023-07-31
Attending: EMERGENCY MEDICINE | Admitting: EMERGENCY MEDICINE
Payer: COMMERCIAL

## 2023-07-30 DIAGNOSIS — R10.2 VAGINAL PAIN: Primary | ICD-10-CM

## 2023-07-30 PROCEDURE — 99281 EMR DPT VST MAYX REQ PHY/QHP: CPT

## 2023-07-31 VITALS
DIASTOLIC BLOOD PRESSURE: 77 MMHG | RESPIRATION RATE: 18 BRPM | HEART RATE: 80 BPM | WEIGHT: 175 LBS | OXYGEN SATURATION: 100 % | SYSTOLIC BLOOD PRESSURE: 154 MMHG | HEIGHT: 67 IN | BODY MASS INDEX: 27.47 KG/M2

## 2023-08-22 ENCOUNTER — APPOINTMENT (OUTPATIENT)
Dept: CT IMAGING | Facility: HOSPITAL | Age: 28
End: 2023-08-22
Payer: COMMERCIAL

## 2023-08-22 ENCOUNTER — HOSPITAL ENCOUNTER (EMERGENCY)
Facility: HOSPITAL | Age: 28
Discharge: HOME | End: 2023-08-23
Attending: EMERGENCY MEDICINE
Payer: COMMERCIAL

## 2023-08-22 DIAGNOSIS — R10.31 RLQ ABDOMINAL PAIN: Primary | ICD-10-CM

## 2023-08-22 LAB
ALBUMIN SERPL-MCNC: 4.3 G/DL (ref 3.5–5.2)
ALBUMIN/GLOB SERPL: 1.3 G/DL
ALP SERPL-CCNC: 65 U/L (ref 39–117)
ALT SERPL W P-5'-P-CCNC: 22 U/L (ref 1–33)
ANION GAP SERPL CALCULATED.3IONS-SCNC: 11 MMOL/L (ref 5–15)
AST SERPL-CCNC: 19 U/L (ref 1–32)
BASOPHILS # BLD AUTO: 0.05 10*3/MM3 (ref 0–0.2)
BASOPHILS NFR BLD AUTO: 0.8 % (ref 0–1.5)
BILIRUB SERPL-MCNC: 0.2 MG/DL (ref 0–1.2)
BILIRUB UR QL STRIP: NEGATIVE
BUN SERPL-MCNC: 11 MG/DL (ref 6–20)
BUN/CREAT SERPL: 11.1 (ref 7–25)
CALCIUM SPEC-SCNC: 9.1 MG/DL (ref 8.6–10.5)
CHLORIDE SERPL-SCNC: 103 MMOL/L (ref 98–107)
CLARITY UR: ABNORMAL
CO2 SERPL-SCNC: 24 MMOL/L (ref 22–29)
COLOR UR: YELLOW
CREAT SERPL-MCNC: 0.99 MG/DL (ref 0.57–1)
DEPRECATED RDW RBC AUTO: 39 FL (ref 37–54)
EGFRCR SERPLBLD CKD-EPI 2021: 79.3 ML/MIN/1.73
EOSINOPHIL # BLD AUTO: 0.19 10*3/MM3 (ref 0–0.4)
EOSINOPHIL NFR BLD AUTO: 2.9 % (ref 0.3–6.2)
ERYTHROCYTE [DISTWIDTH] IN BLOOD BY AUTOMATED COUNT: 12.7 % (ref 12.3–15.4)
GLOBULIN UR ELPH-MCNC: 3.2 GM/DL
GLUCOSE SERPL-MCNC: 100 MG/DL (ref 65–99)
GLUCOSE UR STRIP-MCNC: NEGATIVE MG/DL
HCT VFR BLD AUTO: 39.4 % (ref 34–46.6)
HGB BLD-MCNC: 13.5 G/DL (ref 12–15.9)
HGB UR QL STRIP.AUTO: NEGATIVE
HOLD SPECIMEN: NORMAL
HOLD SPECIMEN: NORMAL
IMM GRANULOCYTES # BLD AUTO: 0.02 10*3/MM3 (ref 0–0.05)
IMM GRANULOCYTES NFR BLD AUTO: 0.3 % (ref 0–0.5)
KETONES UR QL STRIP: NEGATIVE
LEUKOCYTE ESTERASE UR QL STRIP.AUTO: NEGATIVE
LIPASE SERPL-CCNC: 40 U/L (ref 13–60)
LYMPHOCYTES # BLD AUTO: 3.01 10*3/MM3 (ref 0.7–3.1)
LYMPHOCYTES NFR BLD AUTO: 45.7 % (ref 19.6–45.3)
MCH RBC QN AUTO: 28.9 PG (ref 26.6–33)
MCHC RBC AUTO-ENTMCNC: 34.3 G/DL (ref 31.5–35.7)
MCV RBC AUTO: 84.4 FL (ref 79–97)
MONOCYTES # BLD AUTO: 0.41 10*3/MM3 (ref 0.1–0.9)
MONOCYTES NFR BLD AUTO: 6.2 % (ref 5–12)
NEUTROPHILS NFR BLD AUTO: 2.9 10*3/MM3 (ref 1.7–7)
NEUTROPHILS NFR BLD AUTO: 44.1 % (ref 42.7–76)
NITRITE UR QL STRIP: NEGATIVE
NRBC BLD AUTO-RTO: 0 /100 WBC (ref 0–0.2)
PH UR STRIP.AUTO: 6.5 [PH] (ref 5–9)
PLATELET # BLD AUTO: 193 10*3/MM3 (ref 140–450)
PMV BLD AUTO: 10.4 FL (ref 6–12)
POTASSIUM SERPL-SCNC: 4.2 MMOL/L (ref 3.5–5.2)
PROT SERPL-MCNC: 7.5 G/DL (ref 6–8.5)
PROT UR QL STRIP: NEGATIVE
RBC # BLD AUTO: 4.67 10*6/MM3 (ref 3.77–5.28)
SODIUM SERPL-SCNC: 138 MMOL/L (ref 136–145)
SP GR UR STRIP: 1.01 (ref 1–1.03)
UROBILINOGEN UR QL STRIP: ABNORMAL
WBC NRBC COR # BLD: 6.58 10*3/MM3 (ref 3.4–10.8)
WHOLE BLOOD HOLD SPECIMEN: NORMAL

## 2023-08-22 PROCEDURE — 96374 THER/PROPH/DIAG INJ IV PUSH: CPT

## 2023-08-22 PROCEDURE — 74177 CT ABD & PELVIS W/CONTRAST: CPT

## 2023-08-22 PROCEDURE — 25510000001 IOPAMIDOL 61 % SOLUTION: Performed by: EMERGENCY MEDICINE

## 2023-08-22 PROCEDURE — 80053 COMPREHEN METABOLIC PANEL: CPT

## 2023-08-22 PROCEDURE — 83690 ASSAY OF LIPASE: CPT

## 2023-08-22 PROCEDURE — 81003 URINALYSIS AUTO W/O SCOPE: CPT

## 2023-08-22 PROCEDURE — 96361 HYDRATE IV INFUSION ADD-ON: CPT

## 2023-08-22 PROCEDURE — 99285 EMERGENCY DEPT VISIT HI MDM: CPT

## 2023-08-22 PROCEDURE — 85025 COMPLETE CBC W/AUTO DIFF WBC: CPT

## 2023-08-22 PROCEDURE — 25010000002 KETOROLAC TROMETHAMINE PER 15 MG: Performed by: EMERGENCY MEDICINE

## 2023-08-22 RX ORDER — KETOROLAC TROMETHAMINE 30 MG/ML
30 INJECTION, SOLUTION INTRAMUSCULAR; INTRAVENOUS ONCE
Status: COMPLETED | OUTPATIENT
Start: 2023-08-22 | End: 2023-08-22

## 2023-08-22 RX ORDER — SODIUM CHLORIDE 9 MG/ML
125 INJECTION, SOLUTION INTRAVENOUS CONTINUOUS
Status: DISCONTINUED | OUTPATIENT
Start: 2023-08-22 | End: 2023-08-23 | Stop reason: HOSPADM

## 2023-08-22 RX ORDER — SODIUM CHLORIDE 0.9 % (FLUSH) 0.9 %
10 SYRINGE (ML) INJECTION AS NEEDED
Status: DISCONTINUED | OUTPATIENT
Start: 2023-08-22 | End: 2023-08-23 | Stop reason: HOSPADM

## 2023-08-22 RX ADMIN — SODIUM CHLORIDE 125 ML/HR: 9 INJECTION, SOLUTION INTRAVENOUS at 23:13

## 2023-08-22 RX ADMIN — IOPAMIDOL 90 ML: 612 INJECTION, SOLUTION INTRAVENOUS at 23:18

## 2023-08-22 RX ADMIN — KETOROLAC TROMETHAMINE 30 MG: 30 INJECTION, SOLUTION INTRAMUSCULAR; INTRAVENOUS at 23:13

## 2023-08-23 VITALS
WEIGHT: 185 LBS | HEART RATE: 62 BPM | SYSTOLIC BLOOD PRESSURE: 141 MMHG | TEMPERATURE: 98.2 F | HEIGHT: 67 IN | OXYGEN SATURATION: 99 % | BODY MASS INDEX: 29.03 KG/M2 | DIASTOLIC BLOOD PRESSURE: 63 MMHG | RESPIRATION RATE: 18 BRPM

## 2023-08-23 RX ORDER — POLYETHYLENE GLYCOL 3350 17 G/17G
17 POWDER, FOR SOLUTION ORAL DAILY
Qty: 3 PACKET | Refills: 0 | Status: SHIPPED | OUTPATIENT
Start: 2023-08-23 | End: 2023-08-26

## 2023-08-23 RX ORDER — DICYCLOMINE HYDROCHLORIDE 10 MG/1
10 CAPSULE ORAL 4 TIMES DAILY PRN
Qty: 6 CAPSULE | Refills: 0 | Status: SHIPPED | OUTPATIENT
Start: 2023-08-23

## 2023-08-23 NOTE — ED PROVIDER NOTES
Subjective   History of Present Illness  29yo female pmh significant hysterectomy presents ED c/o 1d hx RLQ abdominal pain characterized as 'sharp'/nonradiating/neg exac or relieve factors/associated decreased appetite.  ROS neg fever/chills/cough/chest pain/soa/dysuria/hematuria/melena/hematochoezia/hematemesis.    History provided by:  Patient  Abdominal Pain  Pain location:  RLQ  Pain quality: sharp    Associated symptoms: no constipation, no diarrhea, no dysuria, no hematuria, no nausea and no vomiting      Review of Systems   Constitutional: Negative.    HENT: Negative.     Eyes: Negative.    Respiratory: Negative.     Cardiovascular: Negative.    Gastrointestinal:  Positive for abdominal pain. Negative for blood in stool, constipation, diarrhea, nausea and vomiting.   Genitourinary: Negative.  Negative for dysuria and hematuria.   Musculoskeletal: Negative.    Skin: Negative.    Allergic/Immunologic: Negative for immunocompromised state.   All other systems reviewed and are negative.    Past Medical History:   Diagnosis Date    Abdominal pain     Acute bronchitis     Bipolar 1 disorder     Corneal abrasion     Cyst of left ovary     Depression     Dietary counseling and surveillance     Encounter for  visit     General counseling and advice on female contraception     would like a gary    Gestational diabetes mellitus     only during pregnancy    Hip dysplasia, congenital     bilateral    Manic depression     Multiple personalities     Nausea     Pregnancy test positive     serum    Tension type headache     Viral gastroenteritis        Allergies   Allergen Reactions    Dilaudid [Hydromorphone] Anaphylaxis    Strawberry Hives    Sulfa Antibiotics Hives    Voltaren [Diclofenac] Other (See Comments)     Hoarseness       Past Surgical History:   Procedure Laterality Date     SECTION      COLONOSCOPY N/A 2022    Procedure: COLONOSCOPY;  Surgeon: Johny España MD;  Location: [facility]  ENDOSCOPY;  Service: Gastroenterology;  Laterality: N/A;    EAR TUBES      ENDOSCOPY N/A 2022    Procedure: ESOPHAGOGASTRODUODENOSCOPY;  Surgeon: Johny España MD;  Location: [facility] ENDOSCOPY;  Service: Gastroenterology;  Laterality: N/A;    HIP SURGERY Left     [place] HYSTEROSCOPY ENDOMETRIAL ABLATION N/A 2017    Procedure: HYSTEROSCOPY WITH ENDOMETRIAL ABLATION;  Surgeon: Dominik Toribio MD;  Location: [facility] OR;  Service: Obstetrics/Gynecology    TONSILLECTOMY      T & A    TOTAL LAPAROSCOPIC HYSTERECTOMY SALPINGO OOPHORECTOMY Bilateral 2020    Procedure: TOTAL LAPAROSCOPIC HYSTERECTOMY BILATERAL salpingectomy, cystoscopy, Left Oophorectomy;  Surgeon: Ricardo Orellana DO;  Location: [facility] OR;  Service: Obstetrics/Gynecology;  Laterality: Bilateral;    TUBAL COAGULATION LAPAROSCOPIC Bilateral 2017    Procedure: EXAMINATION UNDER ANESTHESA, LAPAROSCOPIC TUBAL FULGURATION, DIAGNOISTIC HYSTEROSCOPY WITH FRACTIONAL DILATION AND CURETTAGE ENDOMETRAL ABLATION ;  Surgeon: Dominik Toribio MD;  Location: [facility] OR;  Service:        Family History   Problem Relation Age of Onset    Other Father         carbon monoxide poisoning    Cancer Other     Diabetes Other     Hypertension Other     Stroke Other     Cataracts Maternal Grandmother     Cataracts Maternal Grandfather     Cataracts Paternal Grandmother     Cataracts Paternal Grandfather        Social History     Socioeconomic History    Marital status:    Tobacco Use    Smoking status: Former     Packs/day: 0.25     Years: 12.00     Pack years: 3.00     Types: Cigarettes     Quit date: 2023     Years since quittin.5    Smokeless tobacco: Never   Vaping Use    Vaping Use: Every day    Substances: Nicotine, Flavoring    Devices: Disposable   Substance and Sexual Activity    Alcohol use: No    Drug use: Not Currently     Types: Marijuana    Sexual activity: Yes     Partners: Male     Birth control/protection: None,  Surgical           Objective   Physical Exam  Vitals and nursing note reviewed.   Constitutional:       Appearance: Normal appearance.   HENT:      Head: Normocephalic and atraumatic.      Right Ear: External ear normal.      Left Ear: External ear normal.      Nose: Nose normal.      Mouth/Throat:      Mouth: Mucous membranes are moist.      Pharynx: Oropharynx is clear. No oropharyngeal exudate or posterior oropharyngeal erythema.   Eyes:      Pupils: Pupils are equal, round, and reactive to light.   Cardiovascular:      Rate and Rhythm: Normal rate and regular rhythm.      Pulses: Normal pulses.      Heart sounds: Normal heart sounds. No murmur heard.    No friction rub. No gallop.   Pulmonary:      Effort: Pulmonary effort is normal. No respiratory distress.      Breath sounds: Normal breath sounds. No wheezing, rhonchi or rales.   Abdominal:      General: Abdomen is flat. Bowel sounds are normal.      Palpations: Abdomen is soft.      Tenderness:  in the right lower quadrant There is no right CVA tenderness, left CVA tenderness, guarding or rebound. Positive signs include McBurney's sign and psoas sign. Negative signs include Ramsey's sign and Rovsing's sign.      Hernia: There is no hernia in the umbilical area or ventral area.       Musculoskeletal:         General: No swelling or deformity.      Cervical back: Normal range of motion and neck supple. No rigidity.   Lymphadenopathy:      Cervical: No cervical adenopathy.   Skin:     General: Skin is warm and dry.   Neurological:      General: No focal deficit present.      Mental Status: She is alert and oriented to person, place, and time.      GCS: GCS eye subscore is 4. GCS verbal subscore is 5. GCS motor subscore is 6.      Sensory: Sensation is intact.      Motor: Motor function is intact.       Procedures           ED Course      Labs Reviewed   URINALYSIS W/ MICROSCOPIC IF INDICATED (NO CULTURE) - Abnormal; Notable for the following components:        Result Value    Appearance, UA Cloudy (*)     All other components within normal limits    Narrative:     Urine microscopic not indicated.   COMPREHENSIVE METABOLIC PANEL - Abnormal; Notable for the following components:    Glucose 100 (*)     All other components within normal limits    Narrative:     GFR Normal >60  Chronic Kidney Disease <60  Kidney Failure <15     CBC WITH AUTO DIFFERENTIAL - Abnormal; Notable for the following components:    Lymphocyte % 45.7 (*)     All other components within normal limits   LIPASE - Normal   RAINBOW DRAW    Narrative:     The following orders were created for panel order Milo Draw.  Procedure                               Abnormality         Status                     ---------                               -----------         ------                     Green Top (Gel)[[id number]]                                  Final result               Lavender Top[[id number]]                                     Final result               Gold Top - SST[[id number]]                                   Final result               Light Blue Top[[id number]]                                                                Please view results for these tests on the individual orders.   CBC AND DIFFERENTIAL    Narrative:     The following orders were created for panel order CBC & Differential.  Procedure                               Abnormality         Status                     ---------                               -----------         ------                     CBC Auto Differential[[id number]]        Abnormal            Final result                 Please view results for these tests on the individual orders.   GREEN TOP   LAVENDER TOP   GOLD TOP - SST     CT Abdomen Pelvis With Contrast    Result Date: 8/23/2023  Narrative: INDICATION: RLQ abdominal pain COMPARISON: None. TECHNIQUE: Volumetric CT scan of the abdomen and pelvis, from the level of the diaphragms through the pelvis, with  intravenous contrast.  2D axial, sagittal, and coronal reformats were performed. FINDINGS: Lower chest: Heart is normal in size.  Visualized lungs are clear. Liver: Normal. Gallbladder/Biliary: Normal. Pancreas: Normal. Adrenal glands: Normal. Spleen: Normal. Kidneys/Ureters: Normal. Bladder: Unremarkable. Stomach/Bowel: No dilation or wall thickening.  No extraluminal fluid or gas. The appendix is normal. Bones: No suspicious lesions.     Impression: No CT evidence of acute abdominopelvic pathology.                                        Medical Decision Making  Labs/radiographic studies reviewed.  Cbc/cmp/lipase: normal.  UA: unremarkable.  CT abd/pelvis: negative acute finding/appendix normal.  At time of disposition: pt denies abdominal pain at rest, however, reports RLQ abdominal discomfort with movement. Abd soft mild RLQ tenderness. Neg r/r/g/hsm.  No evidence peritonitis/obstruction/perforation/sepsis. Stable discharge with supportive care.  Return precautions provided.    Problems Addressed:  RLQ abdominal pain: complicated acute illness or injury    Amount and/or Complexity of Data Reviewed  Labs: ordered.  Radiology: ordered.    Risk  Prescription drug management.        Final diagnoses:   RLQ abdominal pain       ED Disposition  ED Disposition       ED Disposition   Discharge    Condition   Good    Comment   --               Tammy Jimenez MD  [address]   [facility] [address] [phone]    In 1 day           Medication List        New Prescriptions      dicyclomine 10 MG capsule  Commonly known as: BENTYL  Take 1 capsule by mouth 4 (Four) Times a Day As Needed for Abdominal Cramping.     polyethylene glycol 17 g packet  Commonly known as: MIRALAX  Take 17 g by mouth Daily for 3 days.               Where to Get Your Medications        These medications were sent to [facility] - [place], [place] - [address] [facility] - [phone]  - [phone] [address]  [address]      Phone: [phone]   dicyclomine 10 MG capsule  polyethylene glycol 17 g packet            Chaim Mendoza MD  08/23/23 0029

## 2023-08-26 ENCOUNTER — HOSPITAL ENCOUNTER (EMERGENCY)
Facility: HOSPITAL | Age: 28
Discharge: HOME | End: 2023-08-26
Attending: FAMILY MEDICINE
Payer: COMMERCIAL

## 2023-08-26 ENCOUNTER — APPOINTMENT (OUTPATIENT)
Dept: GENERAL RADIOLOGY | Facility: HOSPITAL | Age: 28
End: 2023-08-26
Payer: COMMERCIAL

## 2023-08-26 VITALS
HEIGHT: 67 IN | DIASTOLIC BLOOD PRESSURE: 75 MMHG | WEIGHT: 185 LBS | SYSTOLIC BLOOD PRESSURE: 117 MMHG | OXYGEN SATURATION: 99 % | BODY MASS INDEX: 29.03 KG/M2 | TEMPERATURE: 98.5 F | RESPIRATION RATE: 20 BRPM | HEART RATE: 74 BPM

## 2023-08-26 DIAGNOSIS — S60.221A CONTUSION OF RIGHT HAND, INITIAL ENCOUNTER: Primary | ICD-10-CM

## 2023-08-26 PROCEDURE — 99283 EMERGENCY DEPT VISIT LOW MDM: CPT

## 2023-08-26 PROCEDURE — 73130 X-RAY EXAM OF HAND: CPT

## 2023-08-26 NOTE — DISCHARGE INSTRUCTIONS
See hand out on RICE therapy. Use your ACE bandage for comfort. Follow up with orthopedics if pain does not improve.

## 2023-08-26 NOTE — ED PROVIDER NOTES
Subjective   History of Present Illness  Patient states she hit her had on the dresser at home last night. C/o swelling and pain to the area. Has not taken anything for pain    Review of Systems   Musculoskeletal:         Right hand pain     Past Medical History:   Diagnosis Date    Abdominal pain     Acute bronchitis     Bipolar 1 disorder     Corneal abrasion     Cyst of left ovary     Depression     Dietary counseling and surveillance     Encounter for  visit     General counseling and advice on female contraception     would like a gary    Gestational diabetes mellitus     only during pregnancy    Hip dysplasia, congenital     bilateral    Manic depression     Multiple personalities     Nausea     Pregnancy test positive     serum    Tension type headache     Viral gastroenteritis        Allergies   Allergen Reactions    Dilaudid [Hydromorphone] Anaphylaxis    Strawberry Hives    Sulfa Antibiotics Hives    Voltaren [Diclofenac] Other (See Comments)     Hoarseness       Past Surgical History:   Procedure Laterality Date     SECTION      COLONOSCOPY N/A 2022    Procedure: COLONOSCOPY;  Surgeon: Johny España MD;  Location: [facility] ENDOSCOPY;  Service: Gastroenterology;  Laterality: N/A;    EAR TUBES      ENDOSCOPY N/A 2022    Procedure: ESOPHAGOGASTRODUODENOSCOPY;  Surgeon: Johny España MD;  Location: [facility] ENDOSCOPY;  Service: Gastroenterology;  Laterality: N/A;    HIP SURGERY Left     [place] HYSTEROSCOPY ENDOMETRIAL ABLATION N/A 2017    Procedure: HYSTEROSCOPY WITH ENDOMETRIAL ABLATION;  Surgeon: Dominik Toribio MD;  Location: [facility] OR;  Service: Obstetrics/Gynecology    TONSILLECTOMY      T & A    TOTAL LAPAROSCOPIC HYSTERECTOMY SALPINGO OOPHORECTOMY Bilateral 2020    Procedure: TOTAL LAPAROSCOPIC HYSTERECTOMY BILATERAL salpingectomy, cystoscopy, Left Oophorectomy;  Surgeon: Ricardo Orellana DO;  Location: [facility] OR;  Service: Obstetrics/Gynecology;   "Laterality: Bilateral;    TUBAL COAGULATION LAPAROSCOPIC Bilateral 2017    Procedure: EXAMINATION UNDER ANESTHESA, LAPAROSCOPIC TUBAL FULGURATION, DIAGNOISTIC HYSTEROSCOPY WITH FRACTIONAL DILATION AND CURETTAGE ENDOMETRAL ABLATION ;  Surgeon: Dominik Toribio MD;  Location: [facility];  Service:        Family History   Problem Relation Age of Onset    Other Father         carbon monoxide poisoning    Cancer Other     Diabetes Other     Hypertension Other     Stroke Other     Cataracts Maternal Grandmother     Cataracts Maternal Grandfather     Cataracts Paternal Grandmother     Cataracts Paternal Grandfather        Social History     Socioeconomic History    Marital status:    Tobacco Use    Smoking status: Former     Packs/day: 0.25     Years: 12.00     Pack years: 3.00     Types: Cigarettes     Quit date: 2023     Years since quittin.5    Smokeless tobacco: Never   Vaping Use    Vaping Use: Every day    Substances: Nicotine, Flavoring    Devices: Disposable   Substance and Sexual Activity    Alcohol use: No    Drug use: Not Currently     Types: Marijuana    Sexual activity: Yes     Partners: Male     Birth control/protection: None, Surgical           Objective   /75 (BP Location: Left arm, Patient Position: Sitting)   Pulse 74   Temp 98.5 øF (36.9 øC) (Oral)   Resp 20   Ht 170.2 cm (67\")   Wt 83.9 kg (185 lb)   LMP 2020   SpO2 99%   BMI 28.98 kg/mý     Physical Exam  Vitals reviewed.   Constitutional:       Appearance: She is not ill-appearing.   Cardiovascular:      Rate and Rhythm: Normal rate.   Pulmonary:      Effort: Pulmonary effort is normal.   Musculoskeletal:         General: Swelling, tenderness and signs of injury present.      Comments: Top of right hand with mild swelling and tenderness. Patient able to move finger slowly secondary to pain. Normal warmth and color.    Skin:     General: Skin is warm and dry.      Capillary Refill: Capillary refill takes less " than 2 seconds.   Neurological:      Mental Status: She is alert and oriented to person, place, and time.   Psychiatric:         Mood and Affect: Mood normal.         Behavior: Behavior normal.       Procedures  XR Hand 3+ View Right    Result Date: 8/26/2023  Narrative: History: injury COMPARISON: None FINDINGS: AP, lateral and oblique views were obtained. There is no fracture, dislocation or osseous destruction.     Impression: No acute abnormality.     CT Abdomen Pelvis With Contrast    Result Date: 8/23/2023  Narrative: INDICATION: RLQ abdominal pain COMPARISON: None. TECHNIQUE: Volumetric CT scan of the abdomen and pelvis, from the level of the diaphragms through the pelvis, with intravenous contrast.  2D axial, sagittal, and coronal reformats were performed. FINDINGS: Lower chest: Heart is normal in size.  Visualized lungs are clear. Liver: Normal. Gallbladder/Biliary: Normal. Pancreas: Normal. Adrenal glands: Normal. Spleen: Normal. Kidneys/Ureters: Normal. Bladder: Unremarkable. Stomach/Bowel: No dilation or wall thickening.  No extraluminal fluid or gas. The appendix is normal. Bones: No suspicious lesions.     Impression: No CT evidence of acute abdominopelvic pathology.            ED Course                                           Medical Decision Making  Patient presents to the ER with c/o right hand pain s/p hitting on dresser last night. X-ray is negative for fracture. Discussed RICE therapy. State she has Tylenol and Motrin at home to take.     Amount and/or Complexity of Data Reviewed  Radiology: ordered.        Final diagnoses:   Contusion of right hand, initial encounter       ED Disposition  ED Disposition       ED Disposition   Discharge    Condition   Stable    Comment   --               Flaquito Manzanares MD  [address] [place] [address] [address] [place] [id number]  [phone]    Schedule an appointment as soon as possible for a visit   If symptoms worsen         Medication List      No changes were  made to your prescriptions during this visit.            Madison Manning, APRN  08/26/23 2028
